# Patient Record
Sex: FEMALE | Race: WHITE | NOT HISPANIC OR LATINO | Employment: FULL TIME | URBAN - METROPOLITAN AREA
[De-identification: names, ages, dates, MRNs, and addresses within clinical notes are randomized per-mention and may not be internally consistent; named-entity substitution may affect disease eponyms.]

---

## 2017-06-13 ENCOUNTER — OFFICE VISIT - HEALTHEAST (OUTPATIENT)
Dept: FAMILY MEDICINE | Facility: CLINIC | Age: 32
End: 2017-06-13

## 2017-06-13 DIAGNOSIS — F41.9 ANXIETY: ICD-10-CM

## 2017-06-13 DIAGNOSIS — N91.2 AMENORRHEA: ICD-10-CM

## 2017-06-13 ASSESSMENT — MIFFLIN-ST. JEOR: SCORE: 1279.68

## 2017-06-14 ENCOUNTER — AMBULATORY - HEALTHEAST (OUTPATIENT)
Dept: FAMILY MEDICINE | Facility: CLINIC | Age: 32
End: 2017-06-14

## 2017-06-14 ENCOUNTER — COMMUNICATION - HEALTHEAST (OUTPATIENT)
Dept: FAMILY MEDICINE | Facility: CLINIC | Age: 32
End: 2017-06-14

## 2017-06-14 DIAGNOSIS — O20.0 THREATENED ABORTION: ICD-10-CM

## 2017-06-15 ENCOUNTER — AMBULATORY - HEALTHEAST (OUTPATIENT)
Dept: LAB | Facility: CLINIC | Age: 32
End: 2017-06-15

## 2017-06-15 DIAGNOSIS — O20.0 THREATENED ABORTION: ICD-10-CM

## 2017-06-19 ENCOUNTER — AMBULATORY - HEALTHEAST (OUTPATIENT)
Dept: LAB | Facility: CLINIC | Age: 32
End: 2017-06-19

## 2017-06-19 DIAGNOSIS — O20.0 THREATENED ABORTION: ICD-10-CM

## 2018-06-01 ENCOUNTER — TRANSFERRED RECORDS (OUTPATIENT)
Dept: HEALTH INFORMATION MANAGEMENT | Facility: CLINIC | Age: 33
End: 2018-06-01

## 2018-06-01 LAB — PAP SMEAR - HIM PATIENT REPORTED: NEGATIVE

## 2019-12-05 ENCOUNTER — PRENATAL OFFICE VISIT (OUTPATIENT)
Dept: NURSING | Facility: CLINIC | Age: 34
End: 2019-12-05
Payer: COMMERCIAL

## 2019-12-05 VITALS
HEIGHT: 64 IN | RESPIRATION RATE: 20 BRPM | WEIGHT: 148.8 LBS | OXYGEN SATURATION: 100 % | HEART RATE: 95 BPM | SYSTOLIC BLOOD PRESSURE: 129 MMHG | BODY MASS INDEX: 25.4 KG/M2 | DIASTOLIC BLOOD PRESSURE: 74 MMHG | TEMPERATURE: 97.1 F

## 2019-12-05 DIAGNOSIS — Z34.90 SUPERVISION OF NORMAL PREGNANCY: ICD-10-CM

## 2019-12-05 DIAGNOSIS — N91.2 AMENORRHEA: Primary | ICD-10-CM

## 2019-12-05 LAB
ALBUMIN UR-MCNC: NEGATIVE MG/DL
APPEARANCE UR: CLEAR
BILIRUB UR QL STRIP: NEGATIVE
COLOR UR AUTO: YELLOW
ERYTHROCYTE [DISTWIDTH] IN BLOOD BY AUTOMATED COUNT: 11.9 % (ref 10–15)
GLUCOSE UR STRIP-MCNC: NEGATIVE MG/DL
HCG UR QL: POSITIVE
HCT VFR BLD AUTO: 31.9 % (ref 35–47)
HGB BLD-MCNC: 10.7 G/DL (ref 11.7–15.7)
HGB UR QL STRIP: NEGATIVE
KETONES UR STRIP-MCNC: NEGATIVE MG/DL
LEUKOCYTE ESTERASE UR QL STRIP: NEGATIVE
MCH RBC QN AUTO: 29.5 PG (ref 26.5–33)
MCHC RBC AUTO-ENTMCNC: 33.5 G/DL (ref 31.5–36.5)
MCV RBC AUTO: 88 FL (ref 78–100)
NITRATE UR QL: NEGATIVE
PH UR STRIP: 6 PH (ref 5–7)
PLATELET # BLD AUTO: 272 10E9/L (ref 150–450)
RBC # BLD AUTO: 3.63 10E12/L (ref 3.8–5.2)
SOURCE: NORMAL
SP GR UR STRIP: <=1.005 (ref 1–1.03)
UROBILINOGEN UR STRIP-ACNC: 0.2 EU/DL (ref 0.2–1)
WBC # BLD AUTO: 9.8 10E9/L (ref 4–11)

## 2019-12-05 PROCEDURE — 86762 RUBELLA ANTIBODY: CPT | Performed by: OBSTETRICS & GYNECOLOGY

## 2019-12-05 PROCEDURE — 87086 URINE CULTURE/COLONY COUNT: CPT | Performed by: OBSTETRICS & GYNECOLOGY

## 2019-12-05 PROCEDURE — 99207 ZZC NO CHARGE NURSE ONLY: CPT

## 2019-12-05 PROCEDURE — 87340 HEPATITIS B SURFACE AG IA: CPT | Performed by: OBSTETRICS & GYNECOLOGY

## 2019-12-05 PROCEDURE — 86850 RBC ANTIBODY SCREEN: CPT | Performed by: OBSTETRICS & GYNECOLOGY

## 2019-12-05 PROCEDURE — 86780 TREPONEMA PALLIDUM: CPT | Performed by: OBSTETRICS & GYNECOLOGY

## 2019-12-05 PROCEDURE — 86900 BLOOD TYPING SEROLOGIC ABO: CPT | Performed by: OBSTETRICS & GYNECOLOGY

## 2019-12-05 PROCEDURE — 36415 COLL VENOUS BLD VENIPUNCTURE: CPT | Performed by: OBSTETRICS & GYNECOLOGY

## 2019-12-05 PROCEDURE — 85027 COMPLETE CBC AUTOMATED: CPT | Performed by: OBSTETRICS & GYNECOLOGY

## 2019-12-05 PROCEDURE — 87389 HIV-1 AG W/HIV-1&-2 AB AG IA: CPT | Performed by: OBSTETRICS & GYNECOLOGY

## 2019-12-05 PROCEDURE — 81003 URINALYSIS AUTO W/O SCOPE: CPT | Performed by: OBSTETRICS & GYNECOLOGY

## 2019-12-05 PROCEDURE — 86901 BLOOD TYPING SEROLOGIC RH(D): CPT | Performed by: OBSTETRICS & GYNECOLOGY

## 2019-12-05 PROCEDURE — 81025 URINE PREGNANCY TEST: CPT | Performed by: FAMILY MEDICINE

## 2019-12-05 RX ORDER — PYRIDOXINE HCL (VITAMIN B6) 25 MG
25 TABLET ORAL DAILY
COMMUNITY
End: 2020-07-23

## 2019-12-05 RX ORDER — FERROUS SULFATE 325(65) MG
325 TABLET ORAL
COMMUNITY
End: 2023-05-10

## 2019-12-05 ASSESSMENT — MIFFLIN-ST. JEOR: SCORE: 1369.95

## 2019-12-05 NOTE — LETTER
December 10, 2019      Rebecca Fischer  [address] [facility] [address]        Dear ,    We are writing to inform you of your test results.    Your lab results were normal except for your hemoglobin which is low. You should try to take your prenatal vitamins and eat an iron rich diet.     Resulted Orders   ABO/Rh type and screen   Result Value Ref Range    ABO A     RH(D) Pos     Antibody Screen Neg     Test Valid Only At [facility]        Specimen Expires 12/08/2019    Hepatitis B surface antigen   Result Value Ref Range    Hep B Surface Agn Nonreactive NR^Nonreactive   CBC with platelets   Result Value Ref Range    WBC 9.8 4.0 - 11.0 10e9/L    RBC Count 3.63 (L) 3.8 - 5.2 10e12/L    Hemoglobin 10.7 (L) 11.7 - 15.7 g/dL    Hematocrit 31.9 (L) 35.0 - 47.0 %    MCV 88 78 - 100 fl    MCH 29.5 26.5 - 33.0 pg    MCHC 33.5 31.5 - 36.5 g/dL    RDW 11.9 10.0 - 15.0 %    Platelet Count 272 150 - 450 10e9/L   HIV Antigen Antibody Combo   Result Value Ref Range    HIV Antigen Antibody Combo Nonreactive NR^Nonreactive          Comment:      HIV-1 p24 Ag & HIV-1/HIV-2 Ab Not Detected   Rubella Antibody IgG Quantitative   Result Value Ref Range    Rubella Antibody IgG Quantitative 25 IU/mL      Comment:      Positive.  Suggests previous exposure or immunization and probable immunity  Reference Range:    Unvaccinated Negative 0-7 IU/mL  Vaccinated or previous exposure Positive 10 IU/ml or greater     Treponema Abs w Reflex to RPR and Titer   Result Value Ref Range    Treponema Antibodies Nonreactive NR^Nonreactive   Urine Culture Aerobic Bacterial   Result Value Ref Range    Specimen Description Midstream Urine     Culture Micro 50,000 to 100,000 colonies/mL  urogenital prateek      *UA reflex to Microscopic   Result Value Ref Range    Color Urine Yellow     Appearance Urine Clear     Glucose Urine Negative NEG^Negative mg/dL    Bilirubin Urine Negative NEG^Negative    Ketones Urine Negative NEG^Negative  mg/dL    Specific Gravity Urine <=1.005 1.003 - 1.035    Blood Urine Negative NEG^Negative    pH Urine 6.0 5.0 - 7.0 pH    Protein Albumin Urine Negative NEG^Negative mg/dL    Urobilinogen Urine 0.2 0.2 - 1.0 EU/dL    Nitrite Urine Negative NEG^Negative    Leukocyte Esterase Urine Negative NEG^Negative    Source Midstream Urine        If you have any questions or concerns, please call the clinic at the number listed above.       Sincerely,        Bruna Ozuna MD

## 2019-12-05 NOTE — NURSING NOTE
Patient presents to clinic today prenatal education, she is accompanied by her  and toddler. This is the patient's 6th pregnancy. She has had 1 miscarriage(s), 0 (s), 4 term birth(s) and 0  birth(s). She has 4 living child(lorin).   This was not a planned pregnancy.  Reviewed answers to patient's Prenatal OB Questionnaire. Screening is positive for history of group B strep.   Nausea for the past 3 weeks, no vomiting, has NOT lost weight. History of anemia, is on iron.   Recently switched birth control and conceived while on oral contraceptive.  Is unsure of LMP, thinks was a light period around 10/3/19.       Medical, surgical, family and ObGyn history updated as appropriate. Reviewed current medications and allergies. Patient is taking prenatal vitamins.     Discussed all of the options within [place] for her prenatal care including FP/OB providers at [facility], midwives and OB/GYN at [facility] and OB/GYN at [place]. Next visit scheduled with Dr. Ozuna at [place].     Reviewed and discussed OB 1st Trimester Plans/Education  and also summarized in detail handouts and brochures included in OB Prenatal Folder that patient is able to keep and bring home with her.    Discussed all of the blood tests with pt who agrees to have all including HIV. Pt aware that results will be discussed at next office visit with MD unless abnl results are indicated and phone call will be made.    Will forward chart on to Prenatal Care Provider to review.    Eden Schmitz RN  [facility]

## 2019-12-05 NOTE — Clinical Note
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   Unplanned pregnancy, 4 previous term deliveries and one miscarriage.   Is on iron for history of anemia.   Conceived while taking oral contraceptive, unsure of LMP so early dating US ordered.   Schedule to see you in a few weeks.   Labs pending.Thanks!Emmie Schmitz, [facility]

## 2019-12-05 NOTE — PROGRESS NOTES
Prenatal OB Questionnaire  Past Medical History  Diabetes   No  Hypertension   No  Heart Disease, mitral valve prolapse, or rheumatic fever?   No  An autoimmune disorder such as Lupus or Rheumatoid Arthritis?   No  Kidney Disease or Urinary Tract Infection?   No  Epilepsy, seizures or spells?   No  Migraine headaches?   No  A stroke or loss of function or sensation?   No  Any other neurological problems?   No  Have you ever been treated for depression?  No  Are you having problems with crying spells or loss of self-esteem?   No  Have you ever required psychiatric care?   No  Have you ever hepatitis, liver disease or jaundice?   No  Have you ever been treated for blood clots in your veins, deep venous thrombosis, inflammation in the veins, thrombosis, phlebitis, pulmonary embolism or varicosities?   No  Have you had excessive bleeding after surgery or dental work?   No  Do you bleed more than other women after a cut or scratch?   No  Do you have a history of anemia?   Yes, currently on iron  Have you ever been treated for thyroid problems or taken thyroid medication?  No  Do you have any other endocrine problems?  No  Have you ever been in a major accident or suffered serious trauma?   No  Within the last year, has anyone hit slapped, kicked or otherwise hurt you?  No  In the last year, has anyone forced you to have sex when you didn't want to?  No  Have you ever had a blood transfusion?   No  Would you refuse a blood transfusion if a doctor judged it to be medically necessary?   No  If you answered yes, would you rather die than have a blood transfusion?   N/A  If you answered yes, is this for Synagogue reasons?   N/A  Does anyone in your home smoke?   Yes,  smokes outdoors  Do you use tobacco products?  No  Do you drink beer, wine, hard liquor?  Yes, occasional, none since pregnancy known  Do you use any of the following: marijuana, speed, cocaine, heroine, hallucinogens, or other drugs?  No  Is your blood  type Rh negative?   No  Have you ever had abnormal antibodies in your blood?   No  Have you ever had asthma?   No  Have you ever had tuberculosis?   No  Do you have any allergies to drugs or over-the-counter medications?   No    Allergies as of 12/5/2019:    Allergies as of 12/05/2019     (No Known Allergies)       Do you have any breast problems?   No  Have you ever ?   Yes, 4-12 mos with previous deliveries  Have you had any gynecological surgical procedures such as cervical conization, a LEEP procedure, laser treatment, cryosurgery of the cervix, or a dilation and curettage, etc?  No  Have you had any other surgical procedures?  Yes, bilateral breast augmentation July 2016  Have you been hospitalized for a nonsurgical reason excluding normal delivery?   No  Have you ever had any anesthetic complications?   Yes, heart rate dropped during epidural infusion with 4/22/18 delivery, had to stop infusion and give meds to increase heart rate  Have you ever had an abnormal pap smear?   No  Do you have a history of abnormalities of the uterus?   No  Did it take you more than one year to become pregnant?   No  Have you ever been evaluated or treated for infertility?   No  Is there a history of medical problems in your family, which you feel might adversely affect your health or pregnancy?   No  Do you have any other problems we have not asked you about which you feel may be important to this pregnancy?  No    Symptoms since Last Menstrual Period  Do you have any of the following:    *abdominal pain  No  *blood in stool or urine  No  *chest pain  No  *shortness of breath  No  *coughing or vomiting up blood No  *heart racing or skipping beats  No  *nausea and vomiting  Yes, no vomiting, has had nausea much of the day for the past 3 weeks, is taking B6, has not lost weight  *pain with urination  No  *vaginal discharge or bleeding  Yes, pink/brown spotting 3 times 2 days ago, no cramping, none since  Current  medications are:  Current Outpatient Medications   Medication Sig Dispense Refill     ferrous sulfate (FEROSUL) 325 (65 Fe) MG tablet Take 325 mg by mouth daily (with breakfast)       Prenatal Vit-Fe Fumarate-FA (PRENATAL VITAMIN AND MINERAL PO)        vitamin B6 (PYRIDOXINE) 25 MG tablet Take 25 mg by mouth daily         Genetic Screening  At the time of birth, will you be 35 years old or older?  No  Has the patient, baby s father, or anyone in either family had:  Thalassemia (Italian, Greek, Mediterranean, or  background only) and an MCV result less than 80?  No  Neural tube defect such as meningomyelocele, spina bifida or anencephaly?  No  Congenital heart defect?  No  Down s syndrome?  No  Jerry-Sach s disease (Religion, Cajun, Estonian-McKenzie)?  No  Sickle cell disease or trait (Twyla)?  No  Hemophilia or other inherited problems of blood coagulation? No  Muscular dystrophy?  No  Cystic Fibrosis?  No  Melony s chorea?  No  Mental retardation/autism? No   If yes, was the person tested for fragile X?  N/A  Any other inherited genetic or chromosomal disorder?  No  Maternal metabolic disorder (e.g. insulin-dependent diabetes, PKU)? No  A child with birth defects not listed above?  No  Recurrent pregnancy loss or a stillbirth?  No  Has the patient had any medications/street drugs/alcohol since her last menstrual period? No  Does the patient or baby s father have any other genetic risks?  No  Infection History  Do you object to being tested for Hepatitis B? No  Do you object to being tested for HIV? No  Do you feel that you are at high risk for coming in contact with the AIDS virus?  No  Have you ever been treated for tuberculosis?  No  Have you ever received the BCG vaccine for tuberculosis?  No  Have you ever had a positive skin test for tuberculosis? No  Do you live with someone who has tuberculosis?  No  Have you ever been exposed to tuberculosis?  No  Do you have genital herpes?  No  Does your partner have  genital herpes?  No  Have you had a rash or viral illness since your last period?  No  Have you ever had Gonorrhea, Chlamydia, Syphilis, venereal warts, trichomoniasis, pelvic inflammatory disease or any other sexually transmitted disease?  No  Do you know if you are a genital group B streptococcus carrier? Yes  You have not had chicken pox/varicella  No  Have you been vaccinated against chicken pox?  No  Have you had any other infectious disease? No        Early ultrasound screening tool:    Does patient have irregular periods?  Yes, unsure of LMP  Did patient use hormonal birth control in the three months prior to positive urine pregnancy test? Yes, conceived while on oral contraceptive  Is the patient breastfeeding?  No  Is the patient 10 weeks or greater at time of education visit?  No    Early dating US ordered, patient provided phone number to call to schedule; advised to have this done soon, before her first OB appointment.      Chart routed to OB provider to review labs and chart.    Eden Schmitz RN  [facility]

## 2019-12-06 LAB
ABO + RH BLD: NORMAL
ABO + RH BLD: NORMAL
BACTERIA SPEC CULT: NORMAL
BLD GP AB SCN SERPL QL: NORMAL
BLOOD BANK CMNT PATIENT-IMP: NORMAL
HBV SURFACE AG SERPL QL IA: NONREACTIVE
HIV 1+2 AB+HIV1 P24 AG SERPL QL IA: NONREACTIVE
RUBV IGG SERPL IA-ACNC: 25 IU/ML
SPECIMEN EXP DATE BLD: NORMAL
SPECIMEN SOURCE: NORMAL
T PALLIDUM AB SER QL: NONREACTIVE

## 2019-12-08 ENCOUNTER — TELEPHONE (OUTPATIENT)
Dept: OBGYN | Facility: CLINIC | Age: 34
End: 2019-12-08

## 2019-12-08 NOTE — TELEPHONE ENCOUNTER
Reason for call:  Other   Patient called regarding (reason for call): appointment  Additional comments: Please call pt to reschedule both appts on Wednesday 12/11/19    Phone number to reach patient:  Home number on file [phone] (home)    Best Time:  mornings    Can we leave a detailed message on this number?  YES

## 2019-12-10 ENCOUNTER — OFFICE VISIT (OUTPATIENT)
Dept: OBGYN | Facility: CLINIC | Age: 34
End: 2019-12-10
Attending: OBSTETRICS & GYNECOLOGY
Payer: COMMERCIAL

## 2019-12-10 ENCOUNTER — ANCILLARY PROCEDURE (OUTPATIENT)
Dept: ULTRASOUND IMAGING | Facility: CLINIC | Age: 34
End: 2019-12-10
Attending: OBSTETRICS & GYNECOLOGY
Payer: COMMERCIAL

## 2019-12-10 VITALS
DIASTOLIC BLOOD PRESSURE: 72 MMHG | BODY MASS INDEX: 25.75 KG/M2 | SYSTOLIC BLOOD PRESSURE: 111 MMHG | WEIGHT: 150 LBS | HEART RATE: 72 BPM | TEMPERATURE: 98.2 F

## 2019-12-10 DIAGNOSIS — Z34.90 EARLY STAGE OF PREGNANCY: Primary | ICD-10-CM

## 2019-12-10 DIAGNOSIS — Z34.90 SUPERVISION OF NORMAL PREGNANCY: ICD-10-CM

## 2019-12-10 PROCEDURE — 99202 OFFICE O/P NEW SF 15 MIN: CPT | Performed by: OBSTETRICS & GYNECOLOGY

## 2019-12-10 PROCEDURE — 76815 OB US LIMITED FETUS(S): CPT | Performed by: OBSTETRICS & GYNECOLOGY

## 2019-12-10 NOTE — PROGRESS NOTES
GYN clinic visit  12/10/2019    CC: ultrasound follow up    HPI:   Rebecca Fischer is a 34 year old  who presents to clinic today to discuss ultrasound results.     Has been feeling well other than fatigued.  No cramping or bleeding.  Some upset stomach, but denies true nausea and no vomiting.  Has noticed some skin sensitivity as well.  Is usually sensitive to metals, but typically wedding ring doesn't bother her.  She has noticed sensitivity to her wedding ring this pregnancy, so has stopped wearing for now.    Recent ultrasound was done today for dating/viability. It showed single live IUP measuring 9w1d, consistent with LMP dating.     Reviewed GYN hx, OB hx, past medical hx, surgical hx and family hx.   Reviewed medications and allergies. Changes made in EPIC.     OBJECTIVE:  Vitals:    12/10/19 0911   BP: 111/72   BP Location: Left arm   Patient Position: Sitting   Cuff Size: Adult Regular   Pulse: 72   Temp: 98.2  F (36.8  C)   TempSrc: Oral   Weight: 68 kg (150 lb)     Body mass index is 25.75 kg/m .    Gen: alert, oriented, no distress, cooperative and pleasant  Psych:  Appropriate mood and affect  Neuro:  Gait WNL.  Sensation and strength grossly intact    ASSESSMENT:   Rebecca Fischer is a 34 year old  here to discuss ultrasound results.       PLAN:  1. Early stage of pregnancy  Discussed skin sensitivity can be normal in pregnancy.  Cont to monitor.  Has NOB visit scheduled in [place].    Reviewed prenatal labs, all WNL.    RTC for NOB or sooner prn.    Radha Tobar MD

## 2019-12-10 NOTE — NURSING NOTE
"Chief Complaint   Patient presents with     Follow Up     Ultrasound       Initial /72 (BP Location: Left arm, Patient Position: Sitting, Cuff Size: Adult Regular)   Pulse 72   Temp 98.2  F (36.8  C) (Oral)   Wt 68 kg (150 lb)   LMP 10/03/2019 (Within Weeks)   BMI 25.75 kg/m   Estimated body mass index is 25.75 kg/m  as calculated from the following:    Height as of 19: 1.626 m (5' 4\").    Weight as of this encounter: 68 kg (150 lb).  BP completed using cuff size: regular    Questioned patient about current smoking habits.  Pt. quit smoking some time ago.          The following HM Due: pap smear      The following patient reported/Care Every where data was sent to:  P ABSTRACT QUALITY INITIATIVES [[id number]]  ca Quinones MA            "

## 2019-12-26 ENCOUNTER — PRENATAL OFFICE VISIT (OUTPATIENT)
Dept: OBGYN | Facility: CLINIC | Age: 34
End: 2019-12-26
Payer: COMMERCIAL

## 2019-12-26 VITALS
TEMPERATURE: 98.1 F | BODY MASS INDEX: 25.54 KG/M2 | OXYGEN SATURATION: 99 % | WEIGHT: 149.6 LBS | DIASTOLIC BLOOD PRESSURE: 77 MMHG | SYSTOLIC BLOOD PRESSURE: 134 MMHG | HEART RATE: 103 BPM | HEIGHT: 64 IN

## 2019-12-26 DIAGNOSIS — Z13.21 ENCOUNTER FOR VITAMIN DEFICIENCY SCREENING: ICD-10-CM

## 2019-12-26 DIAGNOSIS — D64.9 ANEMIA, UNSPECIFIED TYPE: ICD-10-CM

## 2019-12-26 DIAGNOSIS — B37.31 YEAST INFECTION OF THE VAGINA: ICD-10-CM

## 2019-12-26 DIAGNOSIS — Z34.81 ENCOUNTER FOR SUPERVISION OF OTHER NORMAL PREGNANCY IN FIRST TRIMESTER: Primary | ICD-10-CM

## 2019-12-26 LAB
HGB BLD-MCNC: 10.6 G/DL (ref 11.7–15.7)
SPECIMEN SOURCE: ABNORMAL
WET PREP SPEC: ABNORMAL

## 2019-12-26 PROCEDURE — 82728 ASSAY OF FERRITIN: CPT | Performed by: OBSTETRICS & GYNECOLOGY

## 2019-12-26 PROCEDURE — 00000218 ZZHCL STATISTIC OBHBG - HEMOGLOBIN: Performed by: OBSTETRICS & GYNECOLOGY

## 2019-12-26 PROCEDURE — 99207 ZZC FIRST OB VISIT: CPT | Performed by: OBSTETRICS & GYNECOLOGY

## 2019-12-26 PROCEDURE — 83550 IRON BINDING TEST: CPT | Performed by: OBSTETRICS & GYNECOLOGY

## 2019-12-26 PROCEDURE — 83540 ASSAY OF IRON: CPT | Performed by: OBSTETRICS & GYNECOLOGY

## 2019-12-26 PROCEDURE — 82306 VITAMIN D 25 HYDROXY: CPT | Performed by: OBSTETRICS & GYNECOLOGY

## 2019-12-26 PROCEDURE — 36415 COLL VENOUS BLD VENIPUNCTURE: CPT | Performed by: OBSTETRICS & GYNECOLOGY

## 2019-12-26 PROCEDURE — 87210 SMEAR WET MOUNT SALINE/INK: CPT | Performed by: OBSTETRICS & GYNECOLOGY

## 2019-12-26 ASSESSMENT — PATIENT HEALTH QUESTIONNAIRE - PHQ9
SUM OF ALL RESPONSES TO PHQ QUESTIONS 1-9: 6
5. POOR APPETITE OR OVEREATING: SEVERAL DAYS

## 2019-12-26 ASSESSMENT — ANXIETY QUESTIONNAIRES
IF YOU CHECKED OFF ANY PROBLEMS ON THIS QUESTIONNAIRE, HOW DIFFICULT HAVE THESE PROBLEMS MADE IT FOR YOU TO DO YOUR WORK, TAKE CARE OF THINGS AT HOME, OR GET ALONG WITH OTHER PEOPLE: SOMEWHAT DIFFICULT
3. WORRYING TOO MUCH ABOUT DIFFERENT THINGS: NOT AT ALL
5. BEING SO RESTLESS THAT IT IS HARD TO SIT STILL: NOT AT ALL
7. FEELING AFRAID AS IF SOMETHING AWFUL MIGHT HAPPEN: NOT AT ALL
1. FEELING NERVOUS, ANXIOUS, OR ON EDGE: SEVERAL DAYS
GAD7 TOTAL SCORE: 2
6. BECOMING EASILY ANNOYED OR IRRITABLE: NOT AT ALL
2. NOT BEING ABLE TO STOP OR CONTROL WORRYING: NOT AT ALL

## 2019-12-26 ASSESSMENT — MIFFLIN-ST. JEOR: SCORE: 1373.58

## 2019-12-26 NOTE — PROGRESS NOTES
"Chief Complaint   Patient presents with     Prenatal Care     12+0.       Initial /77 (BP Location: Left arm, Patient Position: Sitting, Cuff Size: Adult Regular)   Pulse 103   Temp 98.1  F (36.7  C) (Oral)   Ht 1.626 m (5' 4\")   Wt 67.9 kg (149 lb 9.6 oz)   LMP 10/03/2019 (Within Weeks)   SpO2 99%   BMI 25.68 kg/m   Estimated body mass index is 25.68 kg/m  as calculated from the following:    Height as of this encounter: 1.626 m (5' 4\").    Weight as of this encounter: 67.9 kg (149 lb 9.6 oz).  BP completed using cuff size: regular    Questioned patient about current smoking habits.  Pt. quit smoking some time ago.          The following HM Due: NONE      The following patient reported/Care Every where data was sent to:  P ABSTRACT QUALITY INITIATIVES [[id number]]  Pap smear done on this date: 2018 (approximately), by this group: [facility], results were NIL.       n/a and patient has appointment for today          SUBJECTIVE:   Rebecca Fischer is a  34 year old female   @ 12w0d  by LMP which is consistent with 9 wk US who presents for a new Ob visit.  She is here with her .  Pregnancy was conceived on oral contraceptive pills. Her Estimated Date of Delivery: 2020.    She has a h/o EDWARD and Patient was taking 2 iron pills a day prior to her nob labs.  They are both nurses.  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   She has 4 daughters, the first 3 are from a previous relationship.  He has a couple children as well from previous relationship. They have one child together and this will be the second.   She is accepting of the pregnancy but just very tired and nauseated.     POBHx:  OB History    Para Term  AB Living   6 4 4 0 1 4   SAB TAB Ectopic Multiple Live Births   1 0 0 0 4      # Outcome Date GA Lbr [place]/2nd Weight Sex Delivery Anes PTL Lv   6 Current            5 Term 18 39w0d  3.402 kg (7 lb 8 oz) F Induction EPI  DONAVAN      Birth Comments: heart " "rate dropped during epidural      Complications: Anesthetic Complications      Name: Clara   4 Term 17 40w0d  4.037 kg (8 lb 14.4 oz) F Induction   DONAVAN      Name: Louise   3 SAB 2017 7w0d          2 Term 10/21/11 40w0d  3.674 kg (8 lb 1.6 oz) F    DONAVAN      Name: Valarie   1 Term 09 40w0d  3.856 kg (8 lb 8 oz) F Induction   DONAVAN      Name: Dallas      Obstetric Comments   Oldest 2 were born in [place].    Then regions for the 3rd and last one [facility].    PPH with first baby.     Last baby induced for maternal request   Second delivery was fast.           Patient Active Problem List   Diagnosis     Supervision of normal pregnancy       Past Medical History:   Diagnosis Date     Anemia     on supplemental iron     NO ACTIVE PROBLEMS        Past Surgical History:   Procedure Laterality Date     C BREAST AUGMENTATION          Current Outpatient Medications   Medication     ferrous sulfate (FEROSUL) 325 (65 Fe) MG tablet     Prenatal Vit-Fe Fumarate-FA (PRENATAL VITAMIN AND MINERAL PO)     vitamin B6 (PYRIDOXINE) 25 MG tablet     No current facility-administered medications for this visit.        No Known Allergies      EXAM:  /77 (BP Location: Left arm, Patient Position: Sitting, Cuff Size: Adult Regular)   Pulse 103   Temp 98.1  F (36.7  C) (Oral)   Ht 1.626 m (5' 4\")   Wt 67.9 kg (149 lb 9.6 oz)   LMP 10/03/2019 (Within Weeks)   SpO2 99%   BMI 25.68 kg/m    EXAM  GENERAL: WDWN WF in NAD  HEENT: no abnormalities  NECK: without thyromegaly or adenopathy  BACK: without CVAT or paraspinal tenderness  CHEST: clear to auscultation  CV: RRR without murmur  BREASTS: no palpable masses or adenopathy, no asymmetry or skin dimpling  ABDOMEN: S, NT, no palpable masses or hepatosplenomegaly  MUSCULOSKELETAL: no obvious abnormalities.  NEUROLOGICAL: normal strength, sensation, mental status  PSYCH: normal affect, appropriate  PELVIC: EG - normal adult female.  BUS - within normal limits.  " Vagina - well rugated, + discharge.  Cervix - no lesions, no CMT.  Uterus - 12 wk  size and nontender.  Adnexae - no masses or tenderness.  RV - deferred.    FHT's present with doptone      ASSESSMENT/ PLAN:  IUP @ 12w0d by LMP and early US  Conceived on oral contraceptive pills     Encounter Diagnoses   Name Primary?     Encounter for supervision of other normal pregnancy in first trimester Yes     Encounter for vitamin deficiency screening Vitamin D     Yeast infection of the vagina monistat     Anemia, unspecified type Will check iron studies today        Discussed routine prenatal care and first trimester screen testing.    She declined the first trimester screen.      Patient was counselled on healthy lifestyle habits and all questions answered.    New Ob labs reviewed today.    Return to Clinic in 4 weeks or sooner if problems arise.    Bruna Ozuna MD

## 2019-12-27 LAB
DEPRECATED CALCIDIOL+CALCIFEROL SERPL-MC: 41 UG/L (ref 20–75)
FERRITIN SERPL-MCNC: 117 NG/ML (ref 12–150)
IRON SATN MFR SERPL: 40 % (ref 15–46)
IRON SERPL-MCNC: 149 UG/DL (ref 35–180)
TIBC SERPL-MCNC: 376 UG/DL (ref 240–430)

## 2019-12-27 ASSESSMENT — ANXIETY QUESTIONNAIRES: GAD7 TOTAL SCORE: 2

## 2020-02-04 ENCOUNTER — PRENATAL OFFICE VISIT (OUTPATIENT)
Dept: OBGYN | Facility: CLINIC | Age: 35
End: 2020-02-04
Payer: COMMERCIAL

## 2020-02-04 VITALS
TEMPERATURE: 98.5 F | BODY MASS INDEX: 26.73 KG/M2 | HEART RATE: 86 BPM | HEIGHT: 64 IN | OXYGEN SATURATION: 100 % | DIASTOLIC BLOOD PRESSURE: 74 MMHG | WEIGHT: 156.6 LBS | SYSTOLIC BLOOD PRESSURE: 117 MMHG

## 2020-02-04 DIAGNOSIS — Z34.82 ENCOUNTER FOR SUPERVISION OF OTHER NORMAL PREGNANCY IN SECOND TRIMESTER: ICD-10-CM

## 2020-02-04 PROCEDURE — 99207 ZZC PRENATAL VISIT: CPT | Performed by: OBSTETRICS & GYNECOLOGY

## 2020-02-04 ASSESSMENT — MIFFLIN-ST. JEOR: SCORE: 1405.33

## 2020-02-04 NOTE — PROGRESS NOTES
17w5d  Feeling much better.  Eating better.  Can feel some FM.    Declined quad screen.  Will get survey set up.  discussed prenatal massage.   Taking PNV, vit D and magnesium for muscle cramps. RR

## 2020-02-26 ENCOUNTER — ANCILLARY PROCEDURE (OUTPATIENT)
Dept: ULTRASOUND IMAGING | Facility: CLINIC | Age: 35
End: 2020-02-26
Attending: OBSTETRICS & GYNECOLOGY
Payer: COMMERCIAL

## 2020-02-26 ENCOUNTER — TRANSCRIBE ORDERS (OUTPATIENT)
Dept: MATERNAL FETAL MEDICINE | Facility: CLINIC | Age: 35
End: 2020-02-26

## 2020-02-26 ENCOUNTER — PRENATAL OFFICE VISIT (OUTPATIENT)
Dept: OBGYN | Facility: CLINIC | Age: 35
End: 2020-02-26
Attending: OBSTETRICS & GYNECOLOGY
Payer: COMMERCIAL

## 2020-02-26 VITALS
TEMPERATURE: 97.7 F | HEIGHT: 64 IN | HEART RATE: 83 BPM | OXYGEN SATURATION: 99 % | DIASTOLIC BLOOD PRESSURE: 69 MMHG | SYSTOLIC BLOOD PRESSURE: 116 MMHG | WEIGHT: 162.5 LBS | BODY MASS INDEX: 27.74 KG/M2

## 2020-02-26 DIAGNOSIS — Z34.82 ENCOUNTER FOR SUPERVISION OF OTHER NORMAL PREGNANCY IN SECOND TRIMESTER: ICD-10-CM

## 2020-02-26 DIAGNOSIS — O26.90 PREGNANCY RELATED CONDITION, ANTEPARTUM: Primary | ICD-10-CM

## 2020-02-26 DIAGNOSIS — Z34.82 ENCOUNTER FOR SUPERVISION OF OTHER NORMAL PREGNANCY IN SECOND TRIMESTER: Primary | ICD-10-CM

## 2020-02-26 PROCEDURE — 99207 ZZC PRENATAL VISIT: CPT | Performed by: OBSTETRICS & GYNECOLOGY

## 2020-02-26 PROCEDURE — 76805 OB US >/= 14 WKS SNGL FETUS: CPT | Performed by: OBSTETRICS & GYNECOLOGY

## 2020-02-26 ASSESSMENT — MIFFLIN-ST. JEOR: SCORE: 1432.1

## 2020-02-26 NOTE — PROGRESS NOTES
20w6d  No complaints.  Baby is moving well.  Had US survey today and did not find out gender.   Left EIF noted, otherwise normal.  Will send to [facility] for level II. discussed findings can be seen in normal babies.  RR

## 2020-02-28 ENCOUNTER — PRE VISIT (OUTPATIENT)
Dept: MATERNAL FETAL MEDICINE | Facility: CLINIC | Age: 35
End: 2020-02-28

## 2020-03-02 ENCOUNTER — HOSPITAL ENCOUNTER (OUTPATIENT)
Dept: ULTRASOUND IMAGING | Facility: CLINIC | Age: 35
Discharge: HOME | End: 2020-03-02
Attending: OBSTETRICS & GYNECOLOGY | Admitting: OBSTETRICS & GYNECOLOGY
Payer: COMMERCIAL

## 2020-03-02 ENCOUNTER — OFFICE VISIT (OUTPATIENT)
Dept: MATERNAL FETAL MEDICINE | Facility: CLINIC | Age: 35
End: 2020-03-02
Attending: OBSTETRICS & GYNECOLOGY
Payer: COMMERCIAL

## 2020-03-02 DIAGNOSIS — O35.BXX0 FETAL CARDIAC ECHOGENIC FOCUS, ANTEPARTUM, SINGLE OR UNSPECIFIED FETUS: Primary | ICD-10-CM

## 2020-03-02 DIAGNOSIS — O26.90 PREGNANCY RELATED CONDITION, ANTEPARTUM: ICD-10-CM

## 2020-03-02 PROCEDURE — 76811 OB US DETAILED SNGL FETUS: CPT

## 2020-03-02 NOTE — PROGRESS NOTES
"Please see \"Imaging\" tab under \"Chart Review\" for details of today's US at the [facility].    Zachary Capps MD  Maternal-Fetal Medicine      "

## 2020-03-11 ENCOUNTER — HEALTH MAINTENANCE LETTER (OUTPATIENT)
Age: 35
End: 2020-03-11

## 2020-03-24 ENCOUNTER — PRENATAL OFFICE VISIT (OUTPATIENT)
Dept: OBGYN | Facility: CLINIC | Age: 35
End: 2020-03-24
Payer: COMMERCIAL

## 2020-03-24 DIAGNOSIS — D64.9 ANEMIA, UNSPECIFIED TYPE: ICD-10-CM

## 2020-03-24 DIAGNOSIS — Z34.82 ENCOUNTER FOR SUPERVISION OF OTHER NORMAL PREGNANCY IN SECOND TRIMESTER: Primary | ICD-10-CM

## 2020-03-24 PROCEDURE — 99207 ZZC PRENATAL VISIT: CPT | Performed by: OBSTETRICS & GYNECOLOGY

## 2020-03-24 NOTE — PROGRESS NOTES
"Rebecca Fischer is a 34 year old female who is being evaluated via a billable telephone visit.      The patient has been notified of following:     \"This telephone visit will be conducted via a call between you and your physician/provider. We have found that certain health care needs can be provided without the need for a physical exam.  This service lets us provide the care you need with a short phone conversation.  If a prescription is necessary we can send it directly to your pharmacy.  If lab work is needed we can place an order for that and you can then stop by our lab to have the test done at a later time.    If during the course of the call the physician/provider feels a telephone visit is not appropriate, you will not be charged for this service.\"     Rebecca Fischer complains of  No chief complaint on file.      I have reviewed and updated the patient's Past Medical History, Social History, Family History and Medication List.    ALLERGIES  Patient has no known allergies.    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  There are COVID pts on the unit but she Has not been assigned to any COVID or PUI patients. Emotionally doing ok.  Has been observing all COVID recommendations in regards to precautions at home and work.   Reviewed need to take iron.  Chart reviewed and despite low hgb, her iron studies have been normal. discussed might have thalassemia.  Can do a hgb Elp at next visit when we do her GCT. Nonetheless, she will start taking iron at this time.   Baby is moving well.  No ob concerns.   Had a isolated finding of EIF on MFM US. No further testing was done.     Assessment/Plan:  1. Encounter for supervision of other normal pregnancy in second trimester  2. Anemia, unspecified type    See info no AVS.   Phone call duration: 10 minutes    Bruna Ozuna MD       "

## 2020-03-24 NOTE — PATIENT INSTRUCTIONS
The best way to prevent an infection is to avoid being exposed to the virus. We recommend that you wash your hands frequently and avoid touching your eyes, nose or mouth.  Practice social distancing by staying home as much as possible, avoiding gatherings and minimize trips for essentials. You should especially avoid any contact with people who are sick. It is possible that people who have the virus have little or no symptoms which is why social distancing is so important to avoid spreading the virus. Clean frequently touched surfaces daily. If you do start to have symptoms such as cough, fever or mild shortness of breath, you should stay home for at least 14 days.  If your symptoms are worrisome or severe or you are scheduled during this time to have a clinic visit you should call us at [phone].    Due to anticipated shortage of blood products we recommend all pregnant women take an iron supplement (ferrous gluconate or ferrous sulfate) in addition to a prenatal vitamin.     The hospital has strict visitor restrictions at this time for your safety. You are allowed to have one healthy adult visitor during your hospital stay and it must be the same person the entire time.     For information about Covid-19 and pregnancy we look to the center for disease control, the CDC. You can look at this information online at:   https://www.cdc.gov/coronavirus/2019-ncov/hcp/pregnant-women-faq.html

## 2020-03-24 NOTE — Clinical Note
Please call this patient and set her up for a clinic visit and doing GCT on 4/21 with me at RD.  Thanks.

## 2020-04-24 ENCOUNTER — PRENATAL OFFICE VISIT (OUTPATIENT)
Dept: OBGYN | Facility: CLINIC | Age: 35
End: 2020-04-24
Payer: COMMERCIAL

## 2020-04-24 DIAGNOSIS — Z34.83 ENCOUNTER FOR SUPERVISION OF OTHER NORMAL PREGNANCY IN THIRD TRIMESTER: Primary | ICD-10-CM

## 2020-04-24 DIAGNOSIS — Z23 NEED FOR TDAP VACCINATION: ICD-10-CM

## 2020-04-24 LAB
GLUCOSE 1H P 50 G GLC PO SERPL-MCNC: 86 MG/DL (ref 60–129)
HGB BLD-MCNC: 10.4 G/DL (ref 11.7–15.7)

## 2020-04-24 PROCEDURE — 90715 TDAP VACCINE 7 YRS/> IM: CPT | Performed by: OBSTETRICS & GYNECOLOGY

## 2020-04-24 PROCEDURE — 83021 HEMOGLOBIN CHROMOTOGRAPHY: CPT | Mod: 90 | Performed by: OBSTETRICS & GYNECOLOGY

## 2020-04-24 PROCEDURE — 82950 GLUCOSE TEST: CPT | Performed by: OBSTETRICS & GYNECOLOGY

## 2020-04-24 PROCEDURE — 99000 SPECIMEN HANDLING OFFICE-LAB: CPT | Performed by: OBSTETRICS & GYNECOLOGY

## 2020-04-24 PROCEDURE — 00000218 ZZHCL STATISTIC OBHBG - HEMOGLOBIN: Performed by: OBSTETRICS & GYNECOLOGY

## 2020-04-24 PROCEDURE — 36415 COLL VENOUS BLD VENIPUNCTURE: CPT | Performed by: OBSTETRICS & GYNECOLOGY

## 2020-04-24 PROCEDURE — 99207 ZZC PRENATAL VISIT: CPT | Performed by: OBSTETRICS & GYNECOLOGY

## 2020-04-24 PROCEDURE — 90471 IMMUNIZATION ADMIN: CPT | Performed by: OBSTETRICS & GYNECOLOGY

## 2020-04-24 ASSESSMENT — PATIENT HEALTH QUESTIONNAIRE - PHQ9: SUM OF ALL RESPONSES TO PHQ QUESTIONS 1-9: 3

## 2020-04-24 NOTE — NURSING NOTE
Clinic Administered Medication Documentation      Injectable Medication Documentation    Patient was given tdap. Prior to medication administration, verified patients identity using patient s name and date of birth. Please see MAR and medication order for additional information. Patient instructed to remain in clinic for 15 minutes.      Was entire vial of medication used? Yes  Vial/Syringe: Single dose vial  Expiration Date:  3/21/22  Was this medication supplied by the patient? No

## 2020-04-24 NOTE — PATIENT INSTRUCTIONS
Patient Education     Adapting to Pregnancy: Third Trimester    Although common during pregnancy, some discomforts may seem worse in the final weeks. Simple lifestyle changes can help. Take care of yourself. And ask your partner to help out with small tasks.  Limiting leg problems  Ways to combat leg issues:    Wear support hose all day.    Avoid snug shoes and clothes that bind, like tight pants and socks with elastic tops.    Sit with your feet and legs raised often.  Caring for your breasts  Tips to follow include:    Wash with plain water. Avoid using harsh soaps or rubbing alcohol. They may cause dryness.    Wear a nursing bra for extra support. It can also hide any leaks from your nipples.  Controlling hemorrhoids  Ways to avoid hemorrhoids include:    Eat foods that are high in fiber. Also, exercise and drink enough fluids. This will reduce constipation and hemorrhoids.    Sleep and nap on your side. This limits pressure on the veins of your rectum.    Try not to stand or sit for long periods.  Controlling back pain  As your body changes during pregnancy, your back must work in new ways. Back pain is due to many causes. Physical changes in your body can strain your back and its supporting muscles. Also, hormones (chemicals that carry messages throughout the body) increase during pregnancy. This can affect how your muscles and joints work together. All of these changes can lead to pain. Pain may be felt in the upper or lower back. Pain is also common in the pelvis. Some pregnant women have sciatica. This is pain caused by pressure on the sciatic nerve running down the back of the leg. Ask your healthcare provider for specific tips and exercises to help control your back pain.  Tips to help you rest  Good rest and sleep will help you feel better. Here are some ideas:    Ask your partner to massage your shoulders, neck, or back.    Limit the errands you do each day.    Lie down in the afternoon or after work  for a few minutes.    Take a warm bath before you go to sleep.    Drink warm milk or teas without caffeine.    Avoid coffee, black tea, and cola.  Stopping heartburn    Avoid spicy, greasy, fried, or acidic foods.    Eat small amounts more often. Eat slowly.   Wait 2 hours after eating before lying down.    Sleep with your upper body raised 6 inches.   Managing mood swings  Ways to manage mood swings include:    Know that mood changes are normal.    Exercise often, but get plenty of rest.    Address any concerns and limit stress. Talking to your partner, other women, or your healthcare provider may help.  Dealing with urinary frequency  Tips to deal with having to urinate often include:    Drink plenty of water all day. If you drink a lot in the evening, though, you may have to get up more in the night.    Limit coffee, black tea, and cola.  Date Last Reviewed: 2/1/2018 2000-2019 [employer]. [address] [place] [address]. All rights reserved. This information is not intended as a substitute for professional medical care. Always follow your healthcare professional's instructions.           Patient Education     Comfort Tips During Pregnancy    Talk with your healthcare provider before using pain-relieving medicine at any time during your pregnancy.  First trimester tips  Nausea    Get up slowly. Eat a few unsalted crackers before you get out of bed.    Avoid smells that bother you.    Eat small bland low fat, light high-protein meals at frequent intervals.    Sip on water, weak tea, or clear soft drinks, like ginger ale. Eat ice chips.  Fatigue    Take catnaps when you can.    Get regular exercise.    Accept help from others.    Practice good sleep habits, like going to bed and getting up at the same time each day. Use your bed only for sleep and sex.  Mood swings    Talk about your feelings with others, including other mothers.    Limit sugar, chocolate, and caffeine.    Eat a healthy  diet. Don t skip meals.    Get regular exercise.  Headaches    Get fresh air and exercise.    Relax and get enough rest.    Check with your healthcare provider before taking any pain medicines.  Second trimester tips  Here are some suggestions to help you cope:    To limit ankle swelling, sit with your feet raised or wear support hose.    If you have pain in your groin and stomach (round ligament pain), avoid sudden twisting movements.    For leg cramps, flexing your foot often brings immediate relief. You also may try massaging your calf in long, downward strokes, or stretching your legs before going to bed. Get enough exercise and wear shoes with flexible soles.  Third trimester tips  Reducing heartburn    Eat small, light meals throughout the day rather than 3 large ones.    Sleep with your upper body raised 6 inches. Don t lie down until 2 hours after you eat.    Don't eat greasy, fried, or spicy foods.    Avoid citrus fruits and juices.  Treating constipation    Eat foods high in fiber (whole-grain foods, fresh fruit and vegetables).    Drink plenty of water.    Get regular exercise.    Discuss other medicines (like docusate and psyllium) with your healthcare provider.  Taking care of your breasts    Avoid using harsh soaps or alcohol, which can cause excessive dryness.    Wear nursing bras. They provide more support than regular bras and can be used after pregnancy if you breastfeed.  Getting a good night s sleep    Take a warm shower before bed.    Sleep on a firm mattress.    Lie on your side with 1 leg crossed over the other.    Use pillows to support arms, legs, and belly.  Date Last Reviewed: 10/1/2017    3500-9234 [employer]. [address] [place] [address], [place] [address]. All rights reserved. This information is not intended as a substitute for professional medical care. Always follow your healthcare professional's instructions.           Patient Education     Pregnancy: Your Third Trimester  Changes  As the baby grows, your body changes too. You may also see signs that your body is getting ready for labor. Be patient. Within a few more weeks, your baby will be born.  How you are changing  Your body is preparing for the birth of your baby. Some of the most common changes are listed below. If you have any questions or concerns, ask your healthcare provider:    You ll gain more weight from fluids, extra blood, and fat deposits.    Your breasts will grow as your body gets ready to feed the baby. They may be more tender. You may also notice a slight yellow or white discharge from the nipples.    Discharge from your vagina may increase. This is normal.    You might see some skin color changes on your forehead, cheeks, or nose. Most of these will go away after you deliver.  How your baby is growing       Month 7  Your baby can open and close his or her eyes and weighs around 4 pounds. If born prematurely (too early), your baby would likely survive with special care. Month 8  Your baby is building up body fat and weighs around 6 pounds. Month 9  Your baby weighs nearly 7 pounds and is about 19 to 21 inches long. In other words, any day now...   Date Last Reviewed: 2/1/2018 2000-2019 [employer]. [address] [place] [address], [place] [address]. All rights reserved. This information is not intended as a substitute for professional medical care. Always follow your healthcare professional's instructions.

## 2020-04-25 VITALS
HEART RATE: 107 BPM | BODY MASS INDEX: 30.12 KG/M2 | SYSTOLIC BLOOD PRESSURE: 114 MMHG | DIASTOLIC BLOOD PRESSURE: 72 MMHG | WEIGHT: 175.49 LBS | TEMPERATURE: 97.1 F | OXYGEN SATURATION: 100 %

## 2020-04-25 NOTE — PROGRESS NOTES
[facility]- OBGYJUAN FRANCISCO    CC: routine prenatal visit.    S:Rebecca Fischer is a 34 year old  at 29w2d by LMP c/w 9w1d us who presents today for routine prenatal visit.  Patient reports she has been feeling good, but tired.  Patient denies any contractions, vaginal bleeding, leakage of fluid.  She states she is feeling normal fetal movement.  She denies any headache, changes in vision, chest pain, chest pressure, shortness of breath, nausea, vomiting, diarrhea, or constipation.      O: /72   Pulse 107   Temp 97.1  F (36.2  C) (Oral)   Wt 79.6 kg (175 lb 6.4 oz)   LMP 10/03/2019 (Within Weeks)   SpO2 100%   BMI 30.11 kg/m      Exam:  General- awake, alert, answering questions appropriately, appears comfortable  CV- regular rate  Lung- breathing comfortably on room air  Ext- bilateral lower extremities with trace edema    Doptones- 140 bpm  Fundal height-28 cm    A&P: Rebecca Fischer is a 34 year old  at 29w2d by LMP c/w 9w1d us who presents today for routine prenatal visit.    (Z34.83) Encounter for supervision of other normal pregnancy in third trimester  (primary encounter diagnosis)  Comment: Patient with previous findings of anemia.  Plan for evaluation with HgbELP.  Plans for peds- [facility].  Plans breast feeding.  States she has pump at home. Plans IUD for birth control.  Will bring leave paperwork to next appointment or send to clinic. Reviewed signs and symptoms of PTL, PPROM.    Plan: HGB Eval Reflex to ELP or RBC Solubility  Phone visit in 2 weeks  In person visit in 4 weeks    (Z23) Need for Tdap vaccination  Comment:Due for Tdap vaccine  Plan: TDAP VACCINE, VACCINE ADMINISTRATION, INITIAL    Sharron Manzanares MD

## 2020-04-26 LAB
HGB A1 MFR BLD: 97 % (ref 95–97.9)
HGB A2 MFR BLD: 2.7 % (ref 2–3.5)
HGB C MFR BLD: 0 % (ref 0–0)
HGB E MFR BLD: 0 % (ref 0–0)
HGB F MFR BLD: 0.3 % (ref 0–2.1)
HGB FRACT BLD ELPH-IMP: NORMAL
HGB OTHER MFR BLD: 0 % (ref 0–0)
HGB S BLD QL SOLY: NORMAL
HGB S MFR BLD: 0 % (ref 0–0)
PATH INTERP BLD-IMP: NORMAL

## 2020-05-06 ENCOUNTER — PRENATAL OFFICE VISIT (OUTPATIENT)
Dept: OBGYN | Facility: CLINIC | Age: 35
End: 2020-05-06
Payer: COMMERCIAL

## 2020-05-06 DIAGNOSIS — Z34.83 ENCOUNTER FOR SUPERVISION OF OTHER NORMAL PREGNANCY IN THIRD TRIMESTER: Primary | ICD-10-CM

## 2020-05-06 PROCEDURE — 99207 ZZC PRENATAL VISIT: CPT | Performed by: OBSTETRICS & GYNECOLOGY

## 2020-05-06 NOTE — PROGRESS NOTES
30w6d   Baby moving ok, a few random ctx's. Swelling up some which might explain her weight gain.  Working in ICU still with many covid pts.  Has not been assigned to covid pts.  discussed precautions.  She is considering mirena IUD, will be breast feeding and will check with insurance on the pump. Likely will do circ if boy.  discussed changes related to covid at the birthplace including universal screening now and option of IOL at 39 wks.  The patient's questions  were answered.  RR

## 2020-05-21 ENCOUNTER — PRENATAL OFFICE VISIT (OUTPATIENT)
Dept: OBGYN | Facility: CLINIC | Age: 35
End: 2020-05-21
Payer: COMMERCIAL

## 2020-05-21 VITALS
WEIGHT: 179.1 LBS | HEART RATE: 83 BPM | HEIGHT: 64 IN | SYSTOLIC BLOOD PRESSURE: 115 MMHG | BODY MASS INDEX: 30.58 KG/M2 | DIASTOLIC BLOOD PRESSURE: 68 MMHG | TEMPERATURE: 98 F

## 2020-05-21 DIAGNOSIS — Z34.83 ENCOUNTER FOR SUPERVISION OF OTHER NORMAL PREGNANCY IN THIRD TRIMESTER: Primary | ICD-10-CM

## 2020-05-21 DIAGNOSIS — O26.893 LOW BACK PAIN DURING PREGNANCY IN THIRD TRIMESTER: ICD-10-CM

## 2020-05-21 DIAGNOSIS — M54.50 LOW BACK PAIN DURING PREGNANCY IN THIRD TRIMESTER: ICD-10-CM

## 2020-05-21 PROCEDURE — 99207 ZZC PRENATAL VISIT: CPT | Performed by: OBSTETRICS & GYNECOLOGY

## 2020-05-21 ASSESSMENT — MIFFLIN-ST. JEOR: SCORE: 1507.39

## 2020-06-02 ENCOUNTER — TELEPHONE (OUTPATIENT)
Dept: PSYCHOLOGY | Facility: CLINIC | Age: 35
End: 2020-06-02

## 2020-06-02 ENCOUNTER — PRENATAL OFFICE VISIT (OUTPATIENT)
Dept: OBGYN | Facility: CLINIC | Age: 35
End: 2020-06-02
Payer: COMMERCIAL

## 2020-06-02 DIAGNOSIS — Z34.83 ENCOUNTER FOR SUPERVISION OF OTHER NORMAL PREGNANCY IN THIRD TRIMESTER: Primary | ICD-10-CM

## 2020-06-02 DIAGNOSIS — F41.9 ANXIETY: ICD-10-CM

## 2020-06-02 PROCEDURE — 99214 OFFICE O/P EST MOD 30 MIN: CPT | Mod: TEL | Performed by: OBSTETRICS & GYNECOLOGY

## 2020-06-02 RX ORDER — HYDROXYZINE HYDROCHLORIDE 25 MG/1
50 TABLET, FILM COATED ORAL 3 TIMES DAILY PRN
Qty: 60 TABLET | Refills: 1 | Status: ON HOLD | OUTPATIENT
Start: 2020-06-02 | End: 2020-07-06

## 2020-06-02 NOTE — PROGRESS NOTES
Telephone Encounter     Phone visit due to COVID.    Start time:1518  Stop time:1535    [facility]- JEANNE    CC:  anxiety    S: Patient reports that she is currently experiencing increased anxiety.  She is feeling stressed and overwhelmed.  She feels it is due to taking care of kids at home, her  in school, working nights, and current events.  She has a history of anxiety and used atarax and benzodiazepines in the past.  In high school she was on Wellbutrin for depression.  She denies any thoughts of hurting herself or others.  She states she feels safe at home.     A&P: Rebecca Fischer is a 34 year old  at 34w5d by LMP c/w 9w1d us who presents today to discuss anxiety.      (F41.9) Anxiety  Comment: Patient with acutely worsening anxiety.  Discussed pros and cons of starting serotonin specific reuptake inhibitor.  Discussed the risks of uncontrolled anxiety in pregnancy.  Recommend establishing care with counselor and referral placed (message sent) to Radha Gardner.  Patient will consider starting zoloft after discussion with her  and prn atarax prescribed for sleep/anxiety.  Also discussed meditation vernon use for anxiety and sleep. Patient will keep visit scheduled for 2020 with Dr. Ozuna.    Plan: sertraline (ZOLOFT) 50 MG tablet, hydrOXYzine         (ATARAX) 25 MG tablet    Sharron Manzanares MD

## 2020-06-02 NOTE — TELEPHONE ENCOUNTER
[facility] received referral from Dr. Sharron Manzanares due to an increase in stress and anxiety. Made first attempt at phone outreach. No answer, voicemail left with scheduling instructions.     [facility] to follow up with [employer] message.     Radha Gardner, [facility]  Behavioral Health Clinician

## 2020-06-05 ENCOUNTER — PRENATAL OFFICE VISIT (OUTPATIENT)
Dept: OBGYN | Facility: CLINIC | Age: 35
End: 2020-06-05
Payer: COMMERCIAL

## 2020-06-05 DIAGNOSIS — F41.9 ANXIETY: ICD-10-CM

## 2020-06-05 DIAGNOSIS — Z34.83 ENCOUNTER FOR SUPERVISION OF OTHER NORMAL PREGNANCY IN THIRD TRIMESTER: Primary | ICD-10-CM

## 2020-06-05 PROCEDURE — 99207 ZZC PRENATAL VISIT: CPT | Performed by: OBSTETRICS & GYNECOLOGY

## 2020-06-05 NOTE — LETTER
[address] [address]  [phone]    2020    Re: Rebecca Fischer  [address] [place] [id number]  [phone] (home)     : 1985      To Whom It May Concern:      Rebecca Fischer is currently 35 weeks pregnant and being seen in our clinic for her prenatal care. Due to advanced pregnancy and additional medical concerns, she will need to begin her maternity leave on 20.     Sincerely,        Bruna Ozuna MD

## 2020-06-05 NOTE — PROGRESS NOTES
35w1d  ~ telephone encounter   Patient complains of increasing difficulty with her pregnancy.  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    Her anxiety has escalated recently as well.  She has been taking atarax at night with slight improvement in sleep but will wake up too early and not be able to go back to sleep.   Her  is part - [employer] and with recent local events, he has been suffering from chest pain which was worked up and noted to be likely anxiety.   Discussed management options for her anxiety.  Suspect that if she begins maternity leave now, she might have less stress and more time to care for herself,  and 4 children.   She would like a letter for work to being maternity leave at this time.  Letter written today. F/u in clinic in one week for GBS.  RR

## 2020-06-08 ENCOUNTER — MYC MEDICAL ADVICE (OUTPATIENT)
Dept: OBGYN | Facility: CLINIC | Age: 35
End: 2020-06-08

## 2020-06-08 NOTE — TELEPHONE ENCOUNTER
[facility] spoke with patient. Telephone visit scheduled for 6/10 at 9:30 am    BRIAN Solis

## 2020-06-11 ENCOUNTER — PATIENT OUTREACH (OUTPATIENT)
Dept: ONCOLOGY | Facility: CLINIC | Age: 35
End: 2020-06-11

## 2020-06-11 ENCOUNTER — PRENATAL OFFICE VISIT (OUTPATIENT)
Dept: OBGYN | Facility: CLINIC | Age: 35
End: 2020-06-11
Payer: COMMERCIAL

## 2020-06-11 VITALS
HEIGHT: 64 IN | DIASTOLIC BLOOD PRESSURE: 77 MMHG | HEART RATE: 85 BPM | WEIGHT: 185.3 LBS | BODY MASS INDEX: 31.63 KG/M2 | TEMPERATURE: 96.8 F | SYSTOLIC BLOOD PRESSURE: 125 MMHG

## 2020-06-11 DIAGNOSIS — Z34.83 ENCOUNTER FOR SUPERVISION OF OTHER NORMAL PREGNANCY IN THIRD TRIMESTER: Primary | ICD-10-CM

## 2020-06-11 DIAGNOSIS — D64.9 ANEMIA, UNSPECIFIED TYPE: ICD-10-CM

## 2020-06-11 LAB
CAPILLARY BLOOD COLLECTION: NORMAL
HGB BLD-MCNC: 11.7 G/DL (ref 11.7–15.7)

## 2020-06-11 PROCEDURE — 00000218 ZZHCL STATISTIC OBHBG - HEMOGLOBIN: Performed by: OBSTETRICS & GYNECOLOGY

## 2020-06-11 PROCEDURE — 87186 SC STD MICRODIL/AGAR DIL: CPT | Performed by: OBSTETRICS & GYNECOLOGY

## 2020-06-11 PROCEDURE — 99207 ZZC PRENATAL VISIT: CPT | Performed by: OBSTETRICS & GYNECOLOGY

## 2020-06-11 PROCEDURE — 36416 COLLJ CAPILLARY BLOOD SPEC: CPT | Performed by: OBSTETRICS & GYNECOLOGY

## 2020-06-11 PROCEDURE — 87653 STREP B DNA AMP PROBE: CPT | Performed by: OBSTETRICS & GYNECOLOGY

## 2020-06-11 ASSESSMENT — MIFFLIN-ST. JEOR: SCORE: 1535.52

## 2020-06-11 ASSESSMENT — PATIENT HEALTH QUESTIONNAIRE - PHQ9: SUM OF ALL RESPONSES TO PHQ QUESTIONS 1-9: 12

## 2020-06-11 NOTE — PROGRESS NOTES
36w0d  Anxiety improving a little, still has trouble waking up at night and not falling back to sleep. Baby is moving a lot.  Cephalic on bedside US.   occ ctx's but nothing regular.  Cervix is closed today. We discussed IOL again and she would like to get scheduled for 39-40 wks.    Reviewed the induction process in length today.  She has been chronically anemic at ~ 10.5 range despite normal iron studies.  We discussed this today and that possibly there is a thalassemia trait that is not picked up on the routine hgb ELP.  She would like to meet with a hematologist to discuss this further.  Referral placed.  GBS and hgb done today. RR

## 2020-06-11 NOTE — PROGRESS NOTES
New Patient Oncology Nurse Navigator Note     Referring provider: Bruna Ozuna    Referring Clinic/Organization: [facility]     Referred to: Benign Hematology    Requested provider (if applicable): First available - did not specify     Referral Received: 06/11/20       Evaluation for : Anemia     Clinical History (per Nurse review of records provided):       She has been chronically anemic at ~ 10.5 range despite normal iron studies.  We discussed this today and that possibly there is a thalassemia trait that is not picked up on the routine hgb ELP.  She would like to meet with a hematologist to discuss this further.    Clinical Assessment / Barriers to Care (Per Nurse):    36 weeks pregnant,   Hgb is up today       Records Location: [facility]     Records Needed:     none    Additional testing needed prior to consult:     None    PLAN:  Schedule at [facility] with benign hematologist, use anemia protocol     Nadege Leal LPN

## 2020-06-12 ENCOUNTER — TELEPHONE (OUTPATIENT)
Dept: ONCOLOGY | Facility: CLINIC | Age: 35
End: 2020-06-12

## 2020-06-12 LAB
GP B STREP DNA SPEC QL NAA+PROBE: POSITIVE
SPECIMEN SOURCE: ABNORMAL

## 2020-06-12 NOTE — TELEPHONE ENCOUNTER
Left vml for pt to contact scheduling for epic referral and IB msg. (See below)      Pls. Schedule for 7/2.       Medina antoine for Nadege

## 2020-06-12 NOTE — TELEPHONE ENCOUNTER
ONCOLOGY INTAKE: Records Information      APPT INFORMATION:  Referring provider:  Dr. Bruna Ozuna  Referring provider s clinic:  [facility]  Reason for visit/diagnosis:     Anemia, unspecified type [D64.9]        Has patient been notified of appointment date and time?: NA    RECORDS INFORMATION:  Were the records received with the referral (via [employer])? Internal Referral    ADDITIONAL INFORMATION:  Left VM with hours and phone. Will try again on 15JUN20 if Pt has not been scheduled. Referral in [facility].

## 2020-06-15 ENCOUNTER — TELEPHONE (OUTPATIENT)
Dept: OBGYN | Facility: CLINIC | Age: 35
End: 2020-06-15

## 2020-06-15 NOTE — LETTER
[address] [address]  [phone]      Elaine 15, 2020      Rebecca Fischer  [facility] [address] [facility] [address]              To Whom It May Concern:    Rebecca Fischer is being seen in our clinic for prenatal care.  Her Estimated Date of Delivery: 2020.  Her partner, Hayden Fischer, is requesting [facility] time to care for her and their  during the summer session.  He will return to school in the fall.    If you have any questions, you can contact my office by calling [phone].      Sincerely,            Bruna Ozuna MD

## 2020-06-15 NOTE — TELEPHONE ENCOUNTER
Pt requesting a letter for her  for leave for the summer session of school.  Letter typed up and in Dr. Ozuna's box to sign.  Pt will pick it up at her appt on 6/25/20.  Anabela Jauregui RN

## 2020-06-16 LAB
BACTERIA SPEC CULT: ABNORMAL
SPECIMEN SOURCE: ABNORMAL

## 2020-06-18 ENCOUNTER — PRENATAL OFFICE VISIT (OUTPATIENT)
Dept: OBGYN | Facility: CLINIC | Age: 35
End: 2020-06-18
Payer: COMMERCIAL

## 2020-06-18 DIAGNOSIS — Z34.80 SUPERVISION OF OTHER NORMAL PREGNANCY, ANTEPARTUM: Primary | ICD-10-CM

## 2020-06-18 PROCEDURE — 99207 ZZC PRENATAL VISIT: CPT | Performed by: OBSTETRICS & GYNECOLOGY

## 2020-06-18 RX ORDER — ACETAMINOPHEN 325 MG/1
650 TABLET ORAL EVERY 4 HOURS PRN
Status: CANCELLED | OUTPATIENT
Start: 2020-06-18

## 2020-06-18 RX ORDER — NALOXONE HYDROCHLORIDE 0.4 MG/ML
.1-.4 INJECTION, SOLUTION INTRAMUSCULAR; INTRAVENOUS; SUBCUTANEOUS
Status: CANCELLED | OUTPATIENT
Start: 2020-06-18

## 2020-06-18 RX ORDER — OXYTOCIN/0.9 % SODIUM CHLORIDE 30/500 ML
100-340 PLASTIC BAG, INJECTION (ML) INTRAVENOUS CONTINUOUS PRN
Status: CANCELLED | OUTPATIENT
Start: 2020-06-18

## 2020-06-18 RX ORDER — SODIUM CHLORIDE, SODIUM LACTATE, POTASSIUM CHLORIDE, CALCIUM CHLORIDE 600; 310; 30; 20 MG/100ML; MG/100ML; MG/100ML; MG/100ML
INJECTION, SOLUTION INTRAVENOUS CONTINUOUS
Status: CANCELLED | OUTPATIENT
Start: 2020-06-18

## 2020-06-18 RX ORDER — IBUPROFEN 200 MG
800 TABLET ORAL
Status: CANCELLED | OUTPATIENT
Start: 2020-06-18

## 2020-06-18 RX ORDER — OXYTOCIN 10 [USP'U]/ML
10 INJECTION, SOLUTION INTRAMUSCULAR; INTRAVENOUS
Status: CANCELLED | OUTPATIENT
Start: 2020-06-18

## 2020-06-18 RX ORDER — OXYCODONE AND ACETAMINOPHEN 5; 325 MG/1; MG/1
1 TABLET ORAL
Status: CANCELLED | OUTPATIENT
Start: 2020-06-18

## 2020-06-18 RX ORDER — CARBOPROST TROMETHAMINE 250 UG/ML
250 INJECTION, SOLUTION INTRAMUSCULAR
Status: CANCELLED | OUTPATIENT
Start: 2020-06-18

## 2020-06-18 RX ORDER — ONDANSETRON 2 MG/ML
4 INJECTION INTRAMUSCULAR; INTRAVENOUS EVERY 6 HOURS PRN
Status: CANCELLED | OUTPATIENT
Start: 2020-06-18

## 2020-06-18 RX ORDER — METHYLERGONOVINE MALEATE 0.2 MG/ML
200 INJECTION INTRAVENOUS
Status: CANCELLED | OUTPATIENT
Start: 2020-06-18

## 2020-06-18 NOTE — PROGRESS NOTES
Phone visit for modified prenatal care for COVID.  Call time 10 minutes.  Feels well, fetus active.  Discussed GBS +, PCN (she was + in all other pregnancies).  RTC 1 week.  Labor orders signed and held.  AM

## 2020-06-24 ENCOUNTER — NURSE TRIAGE (OUTPATIENT)
Dept: NURSING | Facility: CLINIC | Age: 35
End: 2020-06-24

## 2020-06-24 NOTE — TELEPHONE ENCOUNTER
Patient calling - says she was told to call the scheduling line to answer pre-screening questions for her OB appointment tomorrow.  Warm transferred to scheduling line.    Evie Meng, RN  Triage Nurse Advisor    Additional Information    Question about upcoming scheduled test, no triage required and triager able to answer question    Protocols used: INFORMATION ONLY CALL-A-AH

## 2020-06-25 ENCOUNTER — PRENATAL OFFICE VISIT (OUTPATIENT)
Dept: OBGYN | Facility: CLINIC | Age: 35
End: 2020-06-25
Payer: COMMERCIAL

## 2020-06-25 VITALS
HEART RATE: 91 BPM | TEMPERATURE: 98.5 F | HEIGHT: 64 IN | SYSTOLIC BLOOD PRESSURE: 134 MMHG | WEIGHT: 191 LBS | DIASTOLIC BLOOD PRESSURE: 79 MMHG | BODY MASS INDEX: 32.61 KG/M2

## 2020-06-25 DIAGNOSIS — Z34.83 ENCOUNTER FOR SUPERVISION OF OTHER NORMAL PREGNANCY IN THIRD TRIMESTER: Primary | ICD-10-CM

## 2020-06-25 PROCEDURE — 99207 ZZC PRENATAL VISIT: CPT | Performed by: OBSTETRICS & GYNECOLOGY

## 2020-06-25 ASSESSMENT — MIFFLIN-ST. JEOR: SCORE: 1561.37

## 2020-06-26 NOTE — TELEPHONE ENCOUNTER
RECORDS STATUS - ALL OTHER DIAGNOSIS      RECORDS RECEIVED FROM: [facility] - Internal Referral   DATE RECEIVED: 6/26/20   NOTES STATUS DETAILS   OFFICE NOTE from referring provider Epic    OFFICE NOTE from medical oncologist     DISCHARGE SUMMARY from hospital     DISCHARGE REPORT from the ER     OPERATIVE REPORT     MEDICATION LIST     CLINICAL TRIAL TREATMENTS TO DATE     LABS     PATHOLOGY REPORTS     ANYTHING RELATED TO DIAGNOSIS Epic 6/18/20   GENONOMIC TESTING     TYPE:     IMAGING (NEED IMAGES & REPORT)     CT SCANS     MRI     MAMMO     ULTRASOUND     PET

## 2020-06-30 ENCOUNTER — PRENATAL OFFICE VISIT (OUTPATIENT)
Dept: OBGYN | Facility: CLINIC | Age: 35
End: 2020-06-30
Payer: COMMERCIAL

## 2020-06-30 VITALS
DIASTOLIC BLOOD PRESSURE: 76 MMHG | BODY MASS INDEX: 32.81 KG/M2 | SYSTOLIC BLOOD PRESSURE: 115 MMHG | HEART RATE: 84 BPM | HEIGHT: 64 IN | TEMPERATURE: 98 F | WEIGHT: 192.2 LBS

## 2020-06-30 DIAGNOSIS — Z34.80 SUPERVISION OF OTHER NORMAL PREGNANCY, ANTEPARTUM: Primary | ICD-10-CM

## 2020-06-30 PROCEDURE — 99207 ZZC PRENATAL VISIT: CPT | Performed by: OBSTETRICS & GYNECOLOGY

## 2020-06-30 ASSESSMENT — MIFFLIN-ST. JEOR: SCORE: 1566.81

## 2020-06-30 NOTE — PROGRESS NOTES
38w5d  No complaints.  Baby is moving well. Intermittent ctx's.  Would like IOL at 39 wks.    Carranza's score is 5 today.  discussed what to expect with IOL.  She is GBS positive with h/o rapid labors.    Will plan for IOL next week.  RR

## 2020-07-02 DIAGNOSIS — Z34.83 ENCOUNTER FOR SUPERVISION OF OTHER NORMAL PREGNANCY IN THIRD TRIMESTER: ICD-10-CM

## 2020-07-02 PROCEDURE — U0003 INFECTIOUS AGENT DETECTION BY NUCLEIC ACID (DNA OR RNA); SEVERE ACUTE RESPIRATORY SYNDROME CORONAVIRUS 2 (SARS-COV-2) (CORONAVIRUS DISEASE [COVID-19]), AMPLIFIED PROBE TECHNIQUE, MAKING USE OF HIGH THROUGHPUT TECHNOLOGIES AS DESCRIBED BY CMS-2020-01-R: HCPCS | Performed by: OBSTETRICS & GYNECOLOGY

## 2020-07-03 LAB
SARS-COV-2 RNA SPEC QL NAA+PROBE: NOT DETECTED
SPECIMEN SOURCE: NORMAL

## 2020-07-05 ENCOUNTER — HOSPITAL ENCOUNTER (INPATIENT)
Facility: CLINIC | Age: 35
LOS: 3 days | Discharge: HOME | End: 2020-07-08
Attending: OBSTETRICS & GYNECOLOGY | Admitting: OBSTETRICS & GYNECOLOGY
Payer: COMMERCIAL

## 2020-07-05 LAB
ABO + RH BLD: NORMAL
ABO + RH BLD: NORMAL
BASOPHILS # BLD AUTO: 0 10E9/L (ref 0–0.2)
BASOPHILS NFR BLD AUTO: 0.3 %
BLD GP AB SCN SERPL QL: NORMAL
BLOOD BANK CMNT PATIENT-IMP: NORMAL
DIFFERENTIAL METHOD BLD: ABNORMAL
EOSINOPHIL # BLD AUTO: 0.1 10E9/L (ref 0–0.7)
EOSINOPHIL NFR BLD AUTO: 0.8 %
ERYTHROCYTE [DISTWIDTH] IN BLOOD BY AUTOMATED COUNT: 13 % (ref 10–15)
HCT VFR BLD AUTO: 35.4 % (ref 35–47)
HGB BLD-MCNC: 11.6 G/DL (ref 11.7–15.7)
IMM GRANULOCYTES # BLD: 0.2 10E9/L (ref 0–0.4)
IMM GRANULOCYTES NFR BLD: 1.4 %
LYMPHOCYTES # BLD AUTO: 2.1 10E9/L (ref 0.8–5.3)
LYMPHOCYTES NFR BLD AUTO: 18.9 %
MCH RBC QN AUTO: 29.7 PG (ref 26.5–33)
MCHC RBC AUTO-ENTMCNC: 32.8 G/DL (ref 31.5–36.5)
MCV RBC AUTO: 91 FL (ref 78–100)
MONOCYTES # BLD AUTO: 0.9 10E9/L (ref 0–1.3)
MONOCYTES NFR BLD AUTO: 7.7 %
NEUTROPHILS # BLD AUTO: 7.8 10E9/L (ref 1.6–8.3)
NEUTROPHILS NFR BLD AUTO: 70.9 %
NRBC # BLD AUTO: 0 10*3/UL
NRBC BLD AUTO-RTO: 0 /100
PLATELET # BLD AUTO: 176 10E9/L (ref 150–450)
RBC # BLD AUTO: 3.9 10E12/L (ref 3.8–5.2)
SPECIMEN EXP DATE BLD: NORMAL
WBC # BLD AUTO: 11 10E9/L (ref 4–11)

## 2020-07-05 PROCEDURE — 85025 COMPLETE CBC W/AUTO DIFF WBC: CPT | Performed by: OBSTETRICS & GYNECOLOGY

## 2020-07-05 PROCEDURE — 25000132 ZZH RX MED GY IP 250 OP 250 PS 637: Performed by: STUDENT IN AN ORGANIZED HEALTH CARE EDUCATION/TRAINING PROGRAM

## 2020-07-05 PROCEDURE — 3E0P7VZ INTRODUCTION OF HORMONE INTO FEMALE REPRODUCTIVE, VIA NATURAL OR ARTIFICIAL OPENING: ICD-10-PCS | Performed by: OBSTETRICS & GYNECOLOGY

## 2020-07-05 PROCEDURE — 86850 RBC ANTIBODY SCREEN: CPT | Performed by: OBSTETRICS & GYNECOLOGY

## 2020-07-05 PROCEDURE — 86900 BLOOD TYPING SEROLOGIC ABO: CPT | Performed by: OBSTETRICS & GYNECOLOGY

## 2020-07-05 PROCEDURE — 86901 BLOOD TYPING SEROLOGIC RH(D): CPT | Performed by: OBSTETRICS & GYNECOLOGY

## 2020-07-05 PROCEDURE — 25800030 ZZH RX IP 258 OP 636: Performed by: OBSTETRICS & GYNECOLOGY

## 2020-07-05 PROCEDURE — 59200 INSERT CERVICAL DILATOR: CPT | Mod: GC | Performed by: OBSTETRICS & GYNECOLOGY

## 2020-07-05 PROCEDURE — 86780 TREPONEMA PALLIDUM: CPT | Performed by: OBSTETRICS & GYNECOLOGY

## 2020-07-05 PROCEDURE — 25000128 H RX IP 250 OP 636: Performed by: OBSTETRICS & GYNECOLOGY

## 2020-07-05 PROCEDURE — 12000001 ZZH R&B MED SURG/OB UMMC

## 2020-07-05 PROCEDURE — 25000128 H RX IP 250 OP 636: Performed by: STUDENT IN AN ORGANIZED HEALTH CARE EDUCATION/TRAINING PROGRAM

## 2020-07-05 PROCEDURE — 25000132 ZZH RX MED GY IP 250 OP 250 PS 637: Performed by: OBSTETRICS & GYNECOLOGY

## 2020-07-05 RX ORDER — METHYLERGONOVINE MALEATE 0.2 MG/ML
200 INJECTION INTRAVENOUS
Status: DISCONTINUED | OUTPATIENT
Start: 2020-07-05 | End: 2020-07-06

## 2020-07-05 RX ORDER — NALOXONE HYDROCHLORIDE 0.4 MG/ML
.1-.4 INJECTION, SOLUTION INTRAMUSCULAR; INTRAVENOUS; SUBCUTANEOUS
Status: DISCONTINUED | OUTPATIENT
Start: 2020-07-05 | End: 2020-07-06

## 2020-07-05 RX ORDER — PENICILLIN G POTASSIUM 5000000 [IU]/1
5 INJECTION, POWDER, FOR SOLUTION INTRAMUSCULAR; INTRAVENOUS ONCE
Status: COMPLETED | OUTPATIENT
Start: 2020-07-05 | End: 2020-07-05

## 2020-07-05 RX ORDER — OXYTOCIN 10 [USP'U]/ML
10 INJECTION, SOLUTION INTRAMUSCULAR; INTRAVENOUS
Status: DISCONTINUED | OUTPATIENT
Start: 2020-07-05 | End: 2020-07-06

## 2020-07-05 RX ORDER — OXYTOCIN/0.9 % SODIUM CHLORIDE 30/500 ML
100-340 PLASTIC BAG, INJECTION (ML) INTRAVENOUS CONTINUOUS PRN
Status: COMPLETED | OUTPATIENT
Start: 2020-07-05 | End: 2020-07-06

## 2020-07-05 RX ORDER — MORPHINE SULFATE 10 MG/ML
10 INJECTION, SOLUTION INTRAMUSCULAR; INTRAVENOUS ONCE
Status: COMPLETED | OUTPATIENT
Start: 2020-07-05 | End: 2020-07-05

## 2020-07-05 RX ORDER — HYDROXYZINE HYDROCHLORIDE 25 MG/1
50 TABLET, FILM COATED ORAL EVERY 6 HOURS PRN
Status: DISCONTINUED | OUTPATIENT
Start: 2020-07-05 | End: 2020-07-06

## 2020-07-05 RX ORDER — HYDROXYZINE HYDROCHLORIDE 25 MG/1
100 TABLET, FILM COATED ORAL EVERY 6 HOURS PRN
Status: DISCONTINUED | OUTPATIENT
Start: 2020-07-05 | End: 2020-07-06

## 2020-07-05 RX ORDER — SODIUM CHLORIDE, SODIUM LACTATE, POTASSIUM CHLORIDE, CALCIUM CHLORIDE 600; 310; 30; 20 MG/100ML; MG/100ML; MG/100ML; MG/100ML
INJECTION, SOLUTION INTRAVENOUS CONTINUOUS
Status: DISCONTINUED | OUTPATIENT
Start: 2020-07-05 | End: 2020-07-06

## 2020-07-05 RX ORDER — CARBOPROST TROMETHAMINE 250 UG/ML
250 INJECTION, SOLUTION INTRAMUSCULAR
Status: DISCONTINUED | OUTPATIENT
Start: 2020-07-05 | End: 2020-07-06

## 2020-07-05 RX ORDER — ONDANSETRON 2 MG/ML
4 INJECTION INTRAMUSCULAR; INTRAVENOUS EVERY 6 HOURS PRN
Status: DISCONTINUED | OUTPATIENT
Start: 2020-07-05 | End: 2020-07-06

## 2020-07-05 RX ORDER — ACETAMINOPHEN 325 MG/1
650 TABLET ORAL EVERY 4 HOURS PRN
Status: DISCONTINUED | OUTPATIENT
Start: 2020-07-05 | End: 2020-07-06

## 2020-07-05 RX ORDER — MISOPROSTOL 100 UG/1
25 TABLET ORAL EVERY 4 HOURS PRN
Status: DISCONTINUED | OUTPATIENT
Start: 2020-07-05 | End: 2020-07-06

## 2020-07-05 RX ORDER — HYDROXYZINE HYDROCHLORIDE 50 MG/ML
100 INJECTION, SOLUTION INTRAMUSCULAR ONCE
Status: DISCONTINUED | OUTPATIENT
Start: 2020-07-05 | End: 2020-07-06

## 2020-07-05 RX ORDER — OXYCODONE AND ACETAMINOPHEN 5; 325 MG/1; MG/1
1 TABLET ORAL
Status: DISCONTINUED | OUTPATIENT
Start: 2020-07-05 | End: 2020-07-06

## 2020-07-05 RX ORDER — IBUPROFEN 800 MG/1
800 TABLET, FILM COATED ORAL
Status: COMPLETED | OUTPATIENT
Start: 2020-07-05 | End: 2020-07-06

## 2020-07-05 RX ADMIN — MORPHINE SULFATE 10 MG: 10 INJECTION INTRAVENOUS at 22:20

## 2020-07-05 RX ADMIN — PENICILLIN G POTASSIUM 2.5 MILLION UNITS: 20000000 INJECTION, POWDER, FOR SOLUTION INTRAMUSCULAR; INTRAVENOUS at 21:26

## 2020-07-05 RX ADMIN — PENICILLIN G POTASSIUM 5 MILLION UNITS: 5000000 POWDER, FOR SOLUTION INTRAMUSCULAR; INTRAPLEURAL; INTRATHECAL; INTRAVENOUS at 13:37

## 2020-07-05 RX ADMIN — Medication 25 MCG: at 17:34

## 2020-07-05 RX ADMIN — Medication 25 MCG: at 21:25

## 2020-07-05 RX ADMIN — SODIUM CHLORIDE, POTASSIUM CHLORIDE, SODIUM LACTATE AND CALCIUM CHLORIDE 500 ML: 600; 310; 30; 20 INJECTION, SOLUTION INTRAVENOUS at 21:42

## 2020-07-05 RX ADMIN — SODIUM CHLORIDE, POTASSIUM CHLORIDE, SODIUM LACTATE AND CALCIUM CHLORIDE: 600; 310; 30; 20 INJECTION, SOLUTION INTRAVENOUS at 13:36

## 2020-07-05 RX ADMIN — PENICILLIN G POTASSIUM 2.5 MILLION UNITS: 20000000 INJECTION, POWDER, FOR SOLUTION INTRAMUSCULAR; INTRAVENOUS at 17:39

## 2020-07-05 RX ADMIN — Medication 25 MCG: at 13:24

## 2020-07-05 RX ADMIN — HYDROXYZINE HYDROCHLORIDE 100 MG: 50 TABLET, FILM COATED ORAL at 22:06

## 2020-07-05 NOTE — PROGRESS NOTES
[facility]  Labor Progress Note    Subjective:  Patient doing well. Feeling some contractions.    Objective:   Patient Vitals for the past 4 hrs:   BP Temp Temp src Resp   20 1558 118/69 98  F (36.7  C) Oral 18     SVE: 2.5/30/-3    FHT: Baseline 135, moderate variability, present accelerations, absent decelerations  [place]: 2-3 contractions in 10 minutes    Assessment/Plan:  Ms. Rebecca Fischer is a 34 year old , at 39w3d by LMP c/w 9w1d US, who presents for elective IOL. Pregnancy complicated by GBS positive status.    #IOL  - Small amount of cervical change, give another PV miso now  - GBS positive, PCN running  - Pain: per pt request  - Anticipate      #FWB  - Cat I FHT  - Continuous monitoring    Dr. Ozuna updated    Tan Hernandez MD  OB/GYN Resident, PGY-3  2020 5:38 PM

## 2020-07-05 NOTE — H&P
[facility]  OB History and Physical      Rebecca Fischer MRN# [id number]   Age: 34 year old YOB: 1985     CC: Induction of labor    HPI:  Ms. Rebecca Fischer is a 34 year old  at 39w3d by LMP c/w 9w1d US, who presents for elective IOl. She reports occasional contractions. Denies vaginal bleeding and loss of fluid. Active fetal movement. No additional concerns.    Pregnancy Complications:  - GBS positive status    Prenatal Labs:   Lab Results   Component Value Date    ABO A 2020    RH Pos 2020    AS Neg 2020    HEPBANG Nonreactive 2019    HGB 11.6 (L) 2020       GBS Status:   Lab Results   Component Value Date    GBS Positive (A) 2020       OB History  OB History    Para Term  AB Living   6 4 4 0 1 4   SAB TAB Ectopic Multiple Live Births   1 0 0 0 4      # Outcome Date GA Lbr [place]/2nd Weight Sex Delivery Anes PTL Lv   6 Current            5 Term 18 39w0d  3.402 kg (7 lb 8 oz) F Induction EPI  DONAVAN      Birth Comments: heart rate dropped during epidural      Complications: Anesthetic Complications      Name: Clara   4 Term 17 40w0d  4.037 kg (8 lb 14.4 oz) F Induction   DONAVAN      Name: Louise   3 SAB 2017 7w0d          2 Term 10/21/11 40w0d  3.674 kg (8 lb 1.6 oz) F    DONAVAN      Name: Valarie   1 Term 09 40w0d  3.856 kg (8 lb 8 oz) F Induction   DONAVAN      Name: Dallas      Obstetric Comments   Oldest 2 were born in [place].    Then regions for the 3rd and last one [facility].    PPH with first baby.     Last baby induced for maternal request   Second delivery was fast.        PMHx:   Past Medical History:   Diagnosis Date     Anemia     on supplemental iron     NO ACTIVE PROBLEMS      PSHx:   Past Surgical History:   Procedure Laterality Date     C BREAST AUGMENTATION       Meds:   Medications Prior to Admission   Medication Sig Dispense Refill Last Dose     Acetaminophen (TYLENOL PO)     Past Month at Unknown time     ferrous sulfate (FEROSUL) 325 (65 Fe) MG tablet Take 325 mg by mouth daily (with breakfast)   2020 at 0800     hydrOXYzine (ATARAX) 25 MG tablet Take 2 tablets (50 mg) by mouth 3 times daily as needed for anxiety 60 tablet 1 Past Week at Unknown time     Prenatal Vit-Fe Fumarate-FA (PRENATAL VITAMIN AND MINERAL PO)    2020 at 2200     ECHINACEA EXTRACT PO    More than a month at Unknown time     Multiple Vitamins-Minerals (EMERGEN-C IMMUNE PO)    More than a month at Unknown time     sertraline (ZOLOFT) 50 MG tablet Take 1 tablet (50 mg) by mouth daily (Patient not taking: Reported on 2020) 100 tablet 1      UNABLE TO FIND MEDICATION NAME: vitamin d3 magnesium        vitamin B6 (PYRIDOXINE) 25 MG tablet Take 25 mg by mouth daily        Allergies:  No Known Allergies     FmHx: No family history on file.     SocHx: She denies any tobacco, alcohol, or other drug use during this pregnancy.    ROS:   Complete 10-point ROS negative except as noted in HPI.    PE:  Vit:   Patient Vitals for the past 4 hrs:   BP Temp Temp src Resp   20 1558 118/69 98  F (36.7  C) Oral 18      Gen: Well-appearing, NAD, comfortable   CV: Well perfused  Pulm: Non-labored breathing  Abd: Soft, gravid, non-tender  Ext: Trace LE edema b/l    Cx: 50/-3 per Dr. Ozuna    Pres:  Ceph by BSUS  EFW:  7.5# by [place]  Memb: Intact              FHT: Baseline 135, moderate variability, accelerations present, absent decelerations, reactive NST   [place]: 1-2 contractions in 10 minutes      Assessment  Ms. Rebecca Fischer is a 34 year old , at 39w3d by LMP c/w 9w1d US, who presents for elective IOL. Pregnancy complicated by GBS positive status.    Plan  #IOL  - Admit to L&D, routine labs ordered  - Cervix unfavorable, start with PV miso  - GBS positive, start PCN now due to h/o rapid labors  - Pain: per pt request  - Anticipate     #FWB  - Cat I FHT  - Continuous monitoring    The patient was  discussed with Dr. Ozuna who is in agreement with the treatment plan.    Tan Hernandez MD  OB/GYN Resident, PGY-3  7/5/2020 4:40 PM        Physician Attestation   I, Bruna Ozuna MD, personally examined and evaluated this patient.  I discussed the patient with the resident and care team, and agree with the assessment and plan of care as documented in the note above.   I personally reviewed vital signs, medications, labs, fetal heart tones and toco.  Key findings: Patient is here for elective IOL, multip with favorable cervix and h/o fast labors. Fetal status is reassuring with a reactive NST. She is megha spontaneously about q 10 min. After review of options we decided to go with miso to avoid IV's initially. She will be given first dose of PCN now as she has a h/o fast labors.    Procedure:   cytotec placed in posterior fornix at 1325 without complication.  Patient tolerated it well.      Bruna Ozuna MD   July 6, 2020   Patient seen at 1315

## 2020-07-05 NOTE — PLAN OF CARE
Data: Patient presented to [facility] at 1116.   Reason for maternal/fetal assessment per patient is Induction Of Labor  .  Patient is a . Prenatal record reviewed.      OB History    Para Term  AB Living   6 4 4 0 1 4   SAB TAB Ectopic Multiple Live Births   1 0 0 0 4      # Outcome Date GA Lbr [place]/2nd Weight Sex Delivery Anes PTL Lv   6 Current            5 Term 18 39w0d  3.402 kg (7 lb 8 oz) F Induction EPI  DONAVAN      Birth Comments: heart rate dropped during epidural      Complications: Anesthetic Complications      Name: Clara   4 Term 17 40w0d  4.037 kg (8 lb 14.4 oz) F Induction   DONAVAN      Name: Louise   3 SAB 2017 7w0d          2 Term 10/21/11 40w0d  3.674 kg (8 lb 1.6 oz) F    DONAVAN      Name: Valarie   1 Term 09 40w0d  3.856 kg (8 lb 8 oz) F Induction   DONAVAN      Name: [facility]      Obstetric Comments   Oldest 2 were born in [place].    Then regions for the 3rd and last one [facility].    PPH with first baby.     Last baby induced for maternal request   Second delivery was fast.    . Medical history:   Past Medical History:   Diagnosis Date     Anemia     on supplemental iron     NO ACTIVE PROBLEMS    . Gestational Age 39w3d. VSS. Fetal movement present. Patient denies backache, vaginal discharge, pelvic pressure, UTI symptoms, GI problems, bloody show, vaginal bleeding, edema, headache, visual disturbances, epigastric or URQ pain, abdominal pain, rupture of membranes. Support person, Hayden, present.  Action: Verbal consent for EFM. Triage assessment completed. EFM applied upon arrival. Uterine assessment showed occasional contractions. Fetal assessment: Presumed adequate fetal oxygenation documented (see flow record).   Response: Dr. Ozuna and Dr. Hernandez informed of patients arrival. Plan per provider is to admit patient for induction of labor and start with misoprostol administration. Penicillin started for gbs. Patient verbalized agreement with plan.  Patient transferred to room 444 ambulatory, oriented to room and call light.

## 2020-07-05 NOTE — PROVIDER NOTIFICATION
20 1200   Provider Notification   Provider Name/Title G3 Pager   Method of Notification Electronic Page   Request Evaluate in Person   Notification Reason Patient Arrived     Page Sent: Pt arrived, elective IOL. 39w3d, . GBS+. Denies LOF/bleeding.

## 2020-07-06 ENCOUNTER — ANESTHESIA EVENT (OUTPATIENT)
Dept: OBGYN | Facility: CLINIC | Age: 35
End: 2020-07-06
Payer: COMMERCIAL

## 2020-07-06 ENCOUNTER — ANESTHESIA (OUTPATIENT)
Dept: OBGYN | Facility: CLINIC | Age: 35
End: 2020-07-06
Payer: COMMERCIAL

## 2020-07-06 LAB — T PALLIDUM AB SER QL: NONREACTIVE

## 2020-07-06 PROCEDURE — 3E0R3BZ INTRODUCTION OF ANESTHETIC AGENT INTO SPINAL CANAL, PERCUTANEOUS APPROACH: ICD-10-PCS | Performed by: STUDENT IN AN ORGANIZED HEALTH CARE EDUCATION/TRAINING PROGRAM

## 2020-07-06 PROCEDURE — 25000132 ZZH RX MED GY IP 250 OP 250 PS 637: Performed by: STUDENT IN AN ORGANIZED HEALTH CARE EDUCATION/TRAINING PROGRAM

## 2020-07-06 PROCEDURE — 72200001 ZZH LABOR CARE VAGINAL DELIVERY SINGLE

## 2020-07-06 PROCEDURE — 25800030 ZZH RX IP 258 OP 636: Performed by: STUDENT IN AN ORGANIZED HEALTH CARE EDUCATION/TRAINING PROGRAM

## 2020-07-06 PROCEDURE — 25000128 H RX IP 250 OP 636: Performed by: STUDENT IN AN ORGANIZED HEALTH CARE EDUCATION/TRAINING PROGRAM

## 2020-07-06 PROCEDURE — 25000125 ZZHC RX 250: Performed by: OBSTETRICS & GYNECOLOGY

## 2020-07-06 PROCEDURE — 59400 OBSTETRICAL CARE: CPT | Performed by: OBSTETRICS & GYNECOLOGY

## 2020-07-06 PROCEDURE — 12000001 ZZH R&B MED SURG/OB UMMC

## 2020-07-06 PROCEDURE — 40000977 ZZH STATISTIC ATTENDANCE AT DELIVERY

## 2020-07-06 PROCEDURE — 25000125 ZZHC RX 250: Performed by: STUDENT IN AN ORGANIZED HEALTH CARE EDUCATION/TRAINING PROGRAM

## 2020-07-06 PROCEDURE — 25000132 ZZH RX MED GY IP 250 OP 250 PS 637: Performed by: OBSTETRICS & GYNECOLOGY

## 2020-07-06 PROCEDURE — 25800030 ZZH RX IP 258 OP 636: Performed by: OBSTETRICS & GYNECOLOGY

## 2020-07-06 PROCEDURE — 25800030 ZZH RX IP 258 OP 636

## 2020-07-06 PROCEDURE — 25000132 ZZH RX MED GY IP 250 OP 250 PS 637

## 2020-07-06 PROCEDURE — 25000128 H RX IP 250 OP 636: Performed by: OBSTETRICS & GYNECOLOGY

## 2020-07-06 PROCEDURE — 00HU33Z INSERTION OF INFUSION DEVICE INTO SPINAL CANAL, PERCUTANEOUS APPROACH: ICD-10-PCS | Performed by: STUDENT IN AN ORGANIZED HEALTH CARE EDUCATION/TRAINING PROGRAM

## 2020-07-06 PROCEDURE — 0KQM0ZZ REPAIR PERINEUM MUSCLE, OPEN APPROACH: ICD-10-PCS | Performed by: OBSTETRICS & GYNECOLOGY

## 2020-07-06 RX ORDER — BISACODYL 10 MG
10 SUPPOSITORY, RECTAL RECTAL DAILY PRN
Status: DISCONTINUED | OUTPATIENT
Start: 2020-07-08 | End: 2020-07-08 | Stop reason: HOSPADM

## 2020-07-06 RX ORDER — LIDOCAINE HYDROCHLORIDE 10 MG/ML
INJECTION, SOLUTION EPIDURAL; INFILTRATION; INTRACAUDAL; PERINEURAL
Status: DISCONTINUED
Start: 2020-07-06 | End: 2020-07-06 | Stop reason: HOSPADM

## 2020-07-06 RX ORDER — MODIFIED LANOLIN
OINTMENT (GRAM) TOPICAL
Status: DISCONTINUED | OUTPATIENT
Start: 2020-07-06 | End: 2020-07-08 | Stop reason: HOSPADM

## 2020-07-06 RX ORDER — MISOPROSTOL 200 UG/1
TABLET ORAL
Status: COMPLETED
Start: 2020-07-06 | End: 2020-07-06

## 2020-07-06 RX ORDER — SODIUM CHLORIDE 9 MG/ML
INJECTION, SOLUTION INTRAVENOUS
Status: COMPLETED
Start: 2020-07-06 | End: 2020-07-06

## 2020-07-06 RX ORDER — OXYTOCIN/0.9 % SODIUM CHLORIDE 30/500 ML
340 PLASTIC BAG, INJECTION (ML) INTRAVENOUS CONTINUOUS PRN
Status: DISCONTINUED | OUTPATIENT
Start: 2020-07-06 | End: 2020-07-08 | Stop reason: HOSPADM

## 2020-07-06 RX ORDER — AMOXICILLIN 250 MG
1 CAPSULE ORAL 2 TIMES DAILY
Status: DISCONTINUED | OUTPATIENT
Start: 2020-07-06 | End: 2020-07-08 | Stop reason: HOSPADM

## 2020-07-06 RX ORDER — EPHEDRINE SULFATE 50 MG/ML
5 INJECTION, SOLUTION INTRAMUSCULAR; INTRAVENOUS; SUBCUTANEOUS
Status: DISCONTINUED | OUTPATIENT
Start: 2020-07-06 | End: 2020-07-06

## 2020-07-06 RX ORDER — NALOXONE HYDROCHLORIDE 0.4 MG/ML
.1-.4 INJECTION, SOLUTION INTRAMUSCULAR; INTRAVENOUS; SUBCUTANEOUS
Status: DISCONTINUED | OUTPATIENT
Start: 2020-07-06 | End: 2020-07-08 | Stop reason: HOSPADM

## 2020-07-06 RX ORDER — HYDROCORTISONE 2.5 %
CREAM (GRAM) TOPICAL 3 TIMES DAILY PRN
Status: DISCONTINUED | OUTPATIENT
Start: 2020-07-06 | End: 2020-07-08 | Stop reason: HOSPADM

## 2020-07-06 RX ORDER — NALOXONE HYDROCHLORIDE 0.4 MG/ML
.1-.4 INJECTION, SOLUTION INTRAMUSCULAR; INTRAVENOUS; SUBCUTANEOUS
Status: DISCONTINUED | OUTPATIENT
Start: 2020-07-06 | End: 2020-07-06

## 2020-07-06 RX ORDER — LIDOCAINE HYDROCHLORIDE AND EPINEPHRINE 15; 5 MG/ML; UG/ML
INJECTION, SOLUTION EPIDURAL PRN
Status: DISCONTINUED | OUTPATIENT
Start: 2020-07-06 | End: 2020-07-06 | Stop reason: HOSPADM

## 2020-07-06 RX ORDER — FENTANYL CITRATE 50 UG/ML
100 INJECTION, SOLUTION INTRAMUSCULAR; INTRAVENOUS ONCE
Status: COMPLETED | OUTPATIENT
Start: 2020-07-06 | End: 2020-07-06

## 2020-07-06 RX ORDER — METHYLERGONOVINE MALEATE 0.2 MG/ML
200 INJECTION INTRAVENOUS
Status: DISCONTINUED | OUTPATIENT
Start: 2020-07-06 | End: 2020-07-08 | Stop reason: HOSPADM

## 2020-07-06 RX ORDER — CARBOPROST TROMETHAMINE 250 UG/ML
250 INJECTION, SOLUTION INTRAMUSCULAR
Status: DISCONTINUED | OUTPATIENT
Start: 2020-07-06 | End: 2020-07-08 | Stop reason: HOSPADM

## 2020-07-06 RX ORDER — OXYTOCIN/0.9 % SODIUM CHLORIDE 30/500 ML
PLASTIC BAG, INJECTION (ML) INTRAVENOUS
Status: DISCONTINUED
Start: 2020-07-06 | End: 2020-07-06 | Stop reason: HOSPADM

## 2020-07-06 RX ORDER — OXYTOCIN 10 [USP'U]/ML
10 INJECTION, SOLUTION INTRAMUSCULAR; INTRAVENOUS
Status: DISCONTINUED | OUTPATIENT
Start: 2020-07-06 | End: 2020-07-08 | Stop reason: HOSPADM

## 2020-07-06 RX ORDER — SODIUM CHLORIDE 9 MG/ML
INJECTION, SOLUTION INTRAVENOUS CONTINUOUS
Status: DISCONTINUED | OUTPATIENT
Start: 2020-07-06 | End: 2020-07-06

## 2020-07-06 RX ORDER — ACETAMINOPHEN 325 MG/1
650 TABLET ORAL EVERY 4 HOURS PRN
Status: DISCONTINUED | OUTPATIENT
Start: 2020-07-06 | End: 2020-07-08 | Stop reason: HOSPADM

## 2020-07-06 RX ORDER — IBUPROFEN 600 MG/1
600 TABLET, FILM COATED ORAL EVERY 6 HOURS PRN
Qty: 60 TABLET | Refills: 0 | COMMUNITY
Start: 2020-07-06 | End: 2020-12-29

## 2020-07-06 RX ORDER — OXYTOCIN 10 [USP'U]/ML
INJECTION, SOLUTION INTRAMUSCULAR; INTRAVENOUS
Status: DISCONTINUED
Start: 2020-07-06 | End: 2020-07-06 | Stop reason: HOSPADM

## 2020-07-06 RX ORDER — AMOXICILLIN 250 MG
2 CAPSULE ORAL 2 TIMES DAILY
Status: DISCONTINUED | OUTPATIENT
Start: 2020-07-06 | End: 2020-07-08 | Stop reason: HOSPADM

## 2020-07-06 RX ORDER — OXYCODONE HYDROCHLORIDE 5 MG/1
5 TABLET ORAL ONCE
Status: COMPLETED | OUTPATIENT
Start: 2020-07-06 | End: 2020-07-06

## 2020-07-06 RX ORDER — IBUPROFEN 800 MG/1
800 TABLET, FILM COATED ORAL EVERY 6 HOURS PRN
Status: DISCONTINUED | OUTPATIENT
Start: 2020-07-06 | End: 2020-07-08 | Stop reason: HOSPADM

## 2020-07-06 RX ORDER — KETOROLAC TROMETHAMINE 30 MG/ML
30 INJECTION, SOLUTION INTRAMUSCULAR; INTRAVENOUS ONCE
Status: COMPLETED | OUTPATIENT
Start: 2020-07-06 | End: 2020-07-06

## 2020-07-06 RX ORDER — OXYTOCIN/0.9 % SODIUM CHLORIDE 30/500 ML
100 PLASTIC BAG, INJECTION (ML) INTRAVENOUS CONTINUOUS
Status: DISCONTINUED | OUTPATIENT
Start: 2020-07-06 | End: 2020-07-08 | Stop reason: HOSPADM

## 2020-07-06 RX ORDER — MISOPROSTOL 200 UG/1
TABLET ORAL
Status: DISCONTINUED
Start: 2020-07-06 | End: 2020-07-06 | Stop reason: WASHOUT

## 2020-07-06 RX ORDER — ACETAMINOPHEN 325 MG/1
650 TABLET ORAL EVERY 6 HOURS PRN
Qty: 100 TABLET | Refills: 0 | COMMUNITY
Start: 2020-07-06 | End: 2020-12-29

## 2020-07-06 RX ORDER — NALBUPHINE HYDROCHLORIDE 20 MG/ML
2.5-5 INJECTION, SOLUTION INTRAMUSCULAR; INTRAVENOUS; SUBCUTANEOUS EVERY 6 HOURS PRN
Status: DISCONTINUED | OUTPATIENT
Start: 2020-07-06 | End: 2020-07-06

## 2020-07-06 RX ORDER — AMOXICILLIN 250 MG
1 CAPSULE ORAL DAILY
Qty: 100 TABLET | Refills: 0 | COMMUNITY
Start: 2020-07-06 | End: 2020-07-23

## 2020-07-06 RX ORDER — MISOPROSTOL 200 UG/1
800 TABLET ORAL
Status: DISCONTINUED | OUTPATIENT
Start: 2020-07-06 | End: 2020-07-08 | Stop reason: HOSPADM

## 2020-07-06 RX ADMIN — ACETAMINOPHEN 650 MG: 325 TABLET, FILM COATED ORAL at 09:56

## 2020-07-06 RX ADMIN — KETOROLAC TROMETHAMINE 30 MG: 30 INJECTION, SOLUTION INTRAMUSCULAR at 18:03

## 2020-07-06 RX ADMIN — SODIUM CHLORIDE, POTASSIUM CHLORIDE, SODIUM LACTATE AND CALCIUM CHLORIDE 1000 ML: 600; 310; 30; 20 INJECTION, SOLUTION INTRAVENOUS at 02:30

## 2020-07-06 RX ADMIN — PENICILLIN G POTASSIUM 2.5 MILLION UNITS: 20000000 INJECTION, POWDER, FOR SOLUTION INTRAMUSCULAR; INTRAVENOUS at 06:21

## 2020-07-06 RX ADMIN — LIDOCAINE HYDROCHLORIDE,EPINEPHRINE BITARTRATE 2 ML: 15; .005 INJECTION, SOLUTION EPIDURAL; INFILTRATION; INTRACAUDAL; PERINEURAL at 03:26

## 2020-07-06 RX ADMIN — IBUPROFEN 800 MG: 800 TABLET, FILM COATED ORAL at 23:55

## 2020-07-06 RX ADMIN — Medication 340 ML/HR: at 06:41

## 2020-07-06 RX ADMIN — FENTANYL CITRATE 100 MCG: 50 INJECTION INTRAMUSCULAR; INTRAVENOUS at 02:35

## 2020-07-06 RX ADMIN — SODIUM CHLORIDE: 9 INJECTION, SOLUTION INTRAVENOUS at 06:10

## 2020-07-06 RX ADMIN — DOCUSATE SODIUM 50 MG AND SENNOSIDES 8.6 MG 1 TABLET: 8.6; 5 TABLET, FILM COATED ORAL at 19:42

## 2020-07-06 RX ADMIN — ACETAMINOPHEN 650 MG: 325 TABLET, FILM COATED ORAL at 19:42

## 2020-07-06 RX ADMIN — SODIUM CHLORIDE 250 ML: 9 INJECTION, SOLUTION INTRAVENOUS at 05:50

## 2020-07-06 RX ADMIN — LIDOCAINE HYDROCHLORIDE,EPINEPHRINE BITARTRATE 3 ML: 15; .005 INJECTION, SOLUTION EPIDURAL; INFILTRATION; INTRACAUDAL; PERINEURAL at 03:21

## 2020-07-06 RX ADMIN — PENICILLIN G POTASSIUM 2.5 MILLION UNITS: 20000000 INJECTION, POWDER, FOR SOLUTION INTRAMUSCULAR; INTRAVENOUS at 01:55

## 2020-07-06 RX ADMIN — MISOPROSTOL 800 MCG: 200 TABLET ORAL at 06:47

## 2020-07-06 RX ADMIN — OXYCODONE HYDROCHLORIDE 5 MG: 5 TABLET ORAL at 20:10

## 2020-07-06 RX ADMIN — Medication 10 ML/HR: at 03:30

## 2020-07-06 RX ADMIN — Medication 5 ML: at 03:29

## 2020-07-06 RX ADMIN — SODIUM CHLORIDE, POTASSIUM CHLORIDE, SODIUM LACTATE AND CALCIUM CHLORIDE: 600; 310; 30; 20 INJECTION, SOLUTION INTRAVENOUS at 03:00

## 2020-07-06 RX ADMIN — IBUPROFEN 800 MG: 800 TABLET, FILM COATED ORAL at 14:26

## 2020-07-06 RX ADMIN — ACETAMINOPHEN 650 MG: 325 TABLET, FILM COATED ORAL at 14:25

## 2020-07-06 RX ADMIN — IBUPROFEN 800 MG: 800 TABLET, FILM COATED ORAL at 08:00

## 2020-07-06 RX ADMIN — SODIUM CHLORIDE, POTASSIUM CHLORIDE, SODIUM LACTATE AND CALCIUM CHLORIDE 500 ML: 600; 310; 30; 20 INJECTION, SOLUTION INTRAVENOUS at 04:46

## 2020-07-06 RX ADMIN — Medication: at 03:27

## 2020-07-06 RX ADMIN — LIDOCAINE HYDROCHLORIDE 10 ML: 10 INJECTION, SOLUTION EPIDURAL; INFILTRATION; INTRACAUDAL; PERINEURAL at 06:49

## 2020-07-06 RX ADMIN — ACETAMINOPHEN 650 MG: 325 TABLET, FILM COATED ORAL at 23:55

## 2020-07-06 NOTE — PLAN OF CARE
Data: Rebecca Fischer transferred to 7124 via wheelchair at 0930. Baby transferred via parent's arms.  Action: Receiving unit notified of transfer: Yes. Patient and family notified of room change. Report given to Reina TRAYLOR RN at 0858. Belongings sent to receiving unit. Accompanied by Registered Nurse. Oriented patient to surroundings. Call light within reach. ID bands double-checked with receiving RN.  Response: Patient tolerated transfer and is stable.

## 2020-07-06 NOTE — PROGRESS NOTES
[facility]  Labor Progress Note    Subjective:  Patient doing well. Not feeling contractions now w epidural - she states she is very comfortable.     Objective:   Patient Vitals for the past 4 hrs:   BP Temp Temp src Resp SpO2   20 0430 100/52 98.3  F (36.8  C) Oral 18 99 %   20 0355 112/64 -- -- 18 100 %   20 0348 107/66 -- -- 18 100 %   20 0345 110/68 -- -- 18 100 %   20 0339 114/63 98.2  F (36.8  C) Oral 18 100 %   20 0333 117/73 -- -- 18 100 %   20 0330 117/74 -- -- 18 100 %   20 0326 119/73 -- -- 18 100 %   20 0324 115/76 -- -- 18 100 %   20 0322 112/73 -- -- 18 100 %   20 0320 110/75 -- -- 18 100 %     SVE: 8.5/90/-1 per bedside RN at 0423 and again my exam at 5:29 AM     FHT: Baseline 140, moderate variability, present accelerations, occasional early and late decelerations  [place]: 3-5 contractions in 10 minutes    Assessment/Plan:  Ms. Rebecca Fischer is a 34 year old , at 39w3d by LMP c/w 9w1d US, who presents for elective IOL. Pregnancy complicated by GBS positive status.     #IOL  - S/p PV miso x2; s/p SROM 0405 clear fluid; laboring on her own, add pitocin PRN   - GBS positive, PCN running    - Pain: epidural w good pain control   - Anticipate      #FWB  - Cat II FHT reassured by moderate variability now and reactivity to scalp stim; she has gotten a 500cc bolus and repositioning  - Continuous monitoring    MARY Cullen MD  Obstetrics and Gyncology, PGY-2  Ob-Gyn PGY-2 Pager: [phone]   20 5:31 AM

## 2020-07-06 NOTE — L&D DELIVERY NOTE
OB Vaginal Delivery Note    Rebecca Fischer MRN# [id number]   Age: 34 year old YOB: 1985       GA: 39w4d  GP:   Labor Complications: None   EBL:   mL  Delivery QBL: 235 mL  Delivery Type: Vaginal, Spontaneous   ROM to Delivery Time: (Delivered) Hours: 2 Minutes: 36  [place] Weight:     1 Minute 5 Minute 10 Minute   Apgar Totals:            CLAUDIA SILVA;LARA HELMS;FUENTES BRADLEY;TARA WOOD     Delivery Details:  Rebecca Fischer, a 34 year old  female delivered a viable infant with at 0640 over a small second degree laceration.    Delivery was via vaginal, spontaneous  to a sterile field under epidural  anesthesia. Infant delivered in vertex  right  occiput  anterior  position. Anterior and posterior shoulders delivered without difficulty.  There was a tight nuchal cord noted x 3 which could not be reduced after delivery of the fetal head.  There was bradycardia noted for just a couple minutes in the second stage as she pushed the baby down to a full crown.  The second stage was only 8 minutes and the fetal heart tones were not down the entire time.  Delyed cord clamping was done and baby cried as soon as the cord was unwrapped.   The cord was clamped, cut twice and 3 vessels  were noted. Cord blood was obtained in routine fashion with the following disposition: lab NICU was present but left soon after birth as baby was fine.       Cord complications: nuchal   Placenta delivered at 2020  6:45 AM . Placental disposition was  . Fundal massage performed and fundus found to be firm.     Episiotomy: none    Perineum, vagina, cervix were inspected, and the following lacerations were noted:   Perineal lacerations: 2nd                Any lacerations were repaired in the usual fashion using 3.0 vicryl.    Excellent hemostasis was noted. Needle count correct. Infant and patient in delivery room in good and stable condition.     Mom and baby stable following birth.      Bruna Ozuna MD        Labor Event Times    Labor onset date:  20 Onset time:   1:30 AM   Dilation complete date:  20 Complete time:   6:32 AM   Start pushing date/time:  2020 0633      Labor Events     labor?:  No   steroids:  None  Labor Type:  Induction/Cervical ripening  Predominate monitoring during 1st stage:  continuous electronic fetal monitoring     Antibiotics received during labor?:  Yes  Reason for Antibiotics:  GBS  Antibiotics received for GBS:  Penicillin  Antibiotics Given (GBS):  Greater than 4 hours prior to delivery     Rupture date/time: 20 0404   Rupture type:  Spontaneous rupture of membranes occuring during spontaneous labor or augmentation  Fluid color:  Clear  Fluid odor:  Normal     Induction:  Misoprostol, Oxytocin  Induction date/time: 20 1100   Cervical ripening date/time:     Indications for induction:  Elective     1:1 continuous labor support provided by?:  RN Labor partogram used?:  no      Delivery/Placenta Date and Time    Delivery Date:  20 Delivery Time:   6:40 AM   Placenta Date/Time:  2020  6:45 AM  Oxytocin given at the time of delivery:  after delivery of baby     Vaginal Counts     Initial count performed by 2 team members:   Two Team Members   WINSOME Weber MD       Needles Suture [place] Sponges Instruments   Initial counts 2  5    Added to count  1     Final counts 2 1 5    Placed during labor Accounted for at the end of labor    Final count performed by 2 team members:   Two Team Members   WINSOME Weber MD      Final count correct?:  Yes     Cord    Vessels:  3 Vessels Complications:  Nuchal   Cord Blood Disposition:  Lab Gases Sent?:  Yes      [place] Resuscitation    [place] Care at Delivery:  Data: Male infant born at 0640. NICU present for delivery due to category two tracing.  Action: No interventions needed. Spontaneous cry, stimulated, placed on mothers abdomen. Delayed cord clamping. Cord  cut. Placed on mothers chest, warm blankets. applied, hat applied.  Response: Stable [place]. Positive bonding behaviors observed.  -Pineda NEGRETE, RN      Labor Events and Shoulder Dystocia    Fetal Tracing Prior to Delivery:  Category 2  Fetal Tracing Comments:  deep decelerations noted in the second stage of labor, triple nuchal cord x 3 tight noted.  unable to reduce following delivery of the head   Shoulder dystocia present?:  Neg     Delivery (Maternal) (Provider to Complete) ([id number])    Episiotomy:  None  Perineal lacerations:  2nd       Blood Loss  Mother: Rebecca Fischer #[id number]   Start of Mother's Information    IO Blood Loss  20 0130 - 20 0834    Delivery QBL (mL) Hospital Encounter 235 mL    Total  235 mL         End of Mother's Information  Mother: Rebecca Fischer #[id number]         Delivery - Provider to Complete ([id number])    Delivering clinician:  Bruna Ozuna MD  Attempted Delivery Types (Choose all that apply):  Spontaneous Vaginal Delivery  Delivery Type (Choose the 1 that will go to the Birth History):  Vaginal, Spontaneous   Other personnel:   Provider Role   Bruna Ozuna MD Obstetrician   Stefanie Hernandez MD Resident   Angy Lucas RN Registered Nurse   Pineda Martin RN Registered Nurse         Placenta    Date/Time:  2020  6:45 AM     Anesthesia    Method:  Epidural  Cervical dilation at placement:  4-7          Presentation and Position    Presentation:  Vertex  Position:  Right Occiput Anterior           Bruna Ozuna MD

## 2020-07-06 NOTE — PROGRESS NOTES
[facility]  Labor Progress Note    Subjective:  Patient doing well. Feeling some contractions, would like vistaril/morphine for sleep.     Objective:   Patient Vitals for the past 4 hrs:   BP Temp Temp src Resp   20 -- 97.9  F (36.6  C) Oral --   20 111/61 98.2  F (36.8  C) Oral 18     SVE: 2.5/30/-3 (at 1730) > 3/60/-3 by staff MD    FHT: Baseline 150, moderate variability, present accelerations, absent decelerations  [place]: 2-3 contractions in 10 minutes    Assessment/Plan:  Ms. Rebecca Fischer is a 34 year old , at 39w3d by LMP c/w 9w1d US, who presents for elective IOL. Pregnancy complicated by GBS positive status.     #IOL  - S/p PV miso x2 > has made cervical change since    - GBS positive, PCN running as pt has history of fast deliveries   - Pain: per pt request  - Anticipate      #FWB  - Cat I FHT  - Continuous monitoring    MARY Cullen MD  Obstetrics and Gyncology, PGY-2  Ob-Gyn PGY-2 Pager: [phone]   20     Patient is feeling more uncomfortable but ctx's are tolerable.  On exam her cervix has made small changes.  Will continue with another dose of miso given that she is responding to it and would prefer not to be hooked up to the IV just yet.    She is hoping to have an epidural prior to the ctx's getting too intense since she has a h/o fast labors.   MDA was given a heads up at safety rounds.   3rd dose of miso placed at  by me.  Patient tolerated it well.   Bruna Ozuna MD  2201

## 2020-07-06 NOTE — PLAN OF CARE
of viable Male with Dr. Ozuna and Dr. Hernandez in attendance. NICU Nursery RN Karyle present. Infant with spontaneous cry to mothers abdomen, dried and stimulated. Apgars 8/9. Placenta delivered without complications, pitocin bolused, 2nd degree laceration, repairs done. Janet cares provided. Mother and baby in stable condition.

## 2020-07-06 NOTE — DISCHARGE SUMMARY
[facility] Discharge Summary    Rebecca Fischer MRN# [id number]   Age: 34 year old YOB: 1985     Date of Admission:  2020  Date of Discharge::  2020  Admitting Physician:  Edna Valdez MD  Discharge Physician:  Yanni Rios MD           Admission Diagnoses:   -IUP at 39w4d  -GBS positive status  -Depression          Discharge Diagnosis:   -IUP at 39w4d, now delivered          Procedures:   Procedure(s): -  -Epidural          Medications Prior to Admission:     Medications Prior to Admission   Medication Sig Dispense Refill Last Dose     Acetaminophen (TYLENOL PO)    Past Month at Unknown time     ferrous sulfate (FEROSUL) 325 (65 Fe) MG tablet Take 325 mg by mouth daily (with breakfast)   2020 at 0800     Prenatal Vit-Fe Fumarate-FA (PRENATAL VITAMIN AND MINERAL PO)    2020 at 2200     ECHINACEA EXTRACT PO    More than a month at Unknown time     Multiple Vitamins-Minerals (EMERGEN-C IMMUNE PO)    More than a month at Unknown time     sertraline (ZOLOFT) 50 MG tablet Take 1 tablet (50 mg) by mouth daily (Patient not taking: Reported on 2020) 100 tablet 1      UNABLE TO FIND MEDICATION NAME: vitamin d3 magnesium        vitamin B6 (PYRIDOXINE) 25 MG tablet Take 25 mg by mouth daily        [DISCONTINUED] hydrOXYzine (ATARAX) 25 MG tablet Take 2 tablets (50 mg) by mouth 3 times daily as needed for anxiety 60 tablet 1 Past Week at Unknown time             Discharge Medications:        Review of your medicines      START taking      Dose / Directions   ibuprofen 600 MG tablet  Commonly known as:  ADVIL/MOTRIN      Dose:  600 mg  Take 1 tablet (600 mg) by mouth every 6 hours as needed for moderate pain Start after delivery  Quantity:  60 tablet  Refills:  0     senna-docusate 8.6-50 MG tablet  Commonly known as:  SENOKOT-S/PERICOLACE      Dose:  1 tablet  Take 1 tablet by mouth daily Start after delivery.  Quantity:  100 tablet  Refills:  0        CONTINUE these  medicines which may have CHANGED, or have new prescriptions. If we are uncertain of the size of tablets/capsules you have at home, strength may be listed as something that might have changed.      Dose / Directions   * TYLENOL PO  This may have changed:  Another medication with the same name was added. Make sure you understand how and when to take each.      Refills:  0     * acetaminophen 325 MG tablet  Commonly known as:  TYLENOL  This may have changed:  You were already taking a medication with the same name, and this prescription was added. Make sure you understand how and when to take each.      Dose:  650 mg  Take 2 tablets (650 mg) by mouth every 6 hours as needed for mild pain Start after Delivery.  Quantity:  100 tablet  Refills:  0         * This list has 2 medication(s) that are the same as other medications prescribed for you. Read the directions carefully, and ask your doctor or other care provider to review them with you.            CONTINUE these medicines which have NOT CHANGED      Dose / Directions   ECHINACEA EXTRACT PO      Refills:  0     EMERGEN-C IMMUNE PO      Refills:  0     ferrous sulfate 325 (65 Fe) MG tablet  Commonly known as:  FEROSUL      Dose:  325 mg  Take 325 mg by mouth daily (with breakfast)  Refills:  0     PRENATAL VITAMIN AND MINERAL PO      Refills:  0     pyridOXINE 25 MG tablet  Commonly known as:  VITAMIN B6      Dose:  25 mg  Take 25 mg by mouth daily  Refills:  0     sertraline 50 MG tablet  Commonly known as:  ZOLOFT  Used for:  Anxiety      Dose:  50 mg  Take 1 tablet (50 mg) by mouth daily  Quantity:  100 tablet  Refills:  1     UNABLE TO FIND      MEDICATION NAME: vitamin d3 magnesium  Refills:  0        STOP taking    hydrOXYzine 25 MG tablet  Commonly known as:  ATARAX              Where to get your medicines      Some of these will need a paper prescription and others can be bought over the counter. Ask your nurse if you have questions.    You don't need a  prescription for these medications    acetaminophen 325 MG tablet    ibuprofen 600 MG tablet    senna-docusate 8.6-50 MG tablet             Consultations:   Anesthesia          Brief Admission History   Ms. Rebecca Fischer is a 34 year old  at 39w3d by LMP c/w 9w1d US, who presents for elective IOl. She reports occasional contractions. Denies vaginal bleeding and loss of fluid. Active fetal movement. No additional concerns.         Brief Intrapartum Course:   Rebecca Fischer, a 34 year old  female delivered a viable infant with at 0640 over a small second degree laceration.    Delivery was via vaginal, spontaneous  to a sterile field under epidural  anesthesia. Infant delivered in vertex  right  occiput  anterior  position. Anterior and posterior shoulders delivered without difficulty.  There was a tight nuchal cord noted x 3 which could not be reduced after delivery of the fetal head.  There was bradycardia noted for just a couple minutes in the second stage as she pushed the baby down to a full crown.  The second stage was only 8 minutes and the fetal heart tones were not down the entire time.  Delyed cord clamping was done and baby cried as soon as the cord was unwrapped.   The cord was clamped, cut twice and 3 vessels  were noted. Cord blood was obtained in routine fashion with the following disposition: lab NICU was present but left soon after birth as baby was fine.              Hospital Course:   The patient's hospital course was unremarkable.  On discharge, her pain was well controlled. Vaginal bleeding is similar to peak menstrual flow.  Voiding without difficulty.  Ambulating well and tolerating a normal diet.  No fever.  Breastfeeding well.  Infant is stable.  No bowel movement yet.*  She was discharged on post-partum day #2.    Post-partum hemoglobin: 10.3          Discharge Instructions and Follow-Up:   Discharge diet: Regular   Discharge activity: Pelvic rest for 6 weeks including no  sexual intercourse, tampons, or douching.    Discharge follow-up: Follow up with your primary OB for a routine postpartum visit in 6 weeks           Discharge Disposition:   Discharged to home in stable condition      MARY Cullen MD  Obstetrics and Gyncology, PGY-2  Ob-Gyn PGY-2 Pager: [phone]

## 2020-07-06 NOTE — PLAN OF CARE
VSS. Denies LOF/bleeding. 3rd dose of misoprostol administered at 2130. Ctx q2-7minutes. FHR baseline increased to 160, LR bolus administered per orders and then tachycardia resolved. Pt ambulating in room and using birthing ball throughout evening. Morphine and vistaril given per orders, pt now resting in bed. Will continue to monitor.

## 2020-07-06 NOTE — PROGRESS NOTES
[facility]  Labor Progress Note    Subjective:  Patient doing well. Feeling contractions, able to sleep a few hours, now megha more uncomfortably would like fentanyl now and epidural soon.     Objective:   Patient Vitals for the past 4 hrs:   BP Temp Temp src Resp   20 2344 109/61 98.6  F (37  C) Oral 18     SVE: 2.5/30/-3 (at 1730) > 3/60/-3 by staff MD > 7/60/-1    FHT: Baseline 120, moderate variability, present accelerations, absent decelerations  [place]: 3-5 contractions in 10 minutes    Assessment/Plan:  Ms. Rebecca Fischer is a 34 year old , at 39w3d by LMP c/w 9w1d US, who presents for elective IOL. Pregnancy complicated by GBS positive status.     #IOL  - S/p PV miso x2 - now laboring on her own, add pitocin PRN; AROM after gets epidural   - GBS positive, PCN running as pt has history of fast deliveries   - Pain: per pt request  - Anticipate      #FWB  - Cat I FHT  - Continuous monitoring    MARY Cullen MD  Obstetrics and Gyncology, PGY-2  Ob-Gyn PGY-2 Pager: [phone]   20 2:32 AM

## 2020-07-06 NOTE — PROGRESS NOTES
Asked by Dr. Ozuna to attend the delivery of this term, male infant with a gestational age of 39 4/7 weeks secondary to decels and nuchal cord.  Infant already born and on mother's chest when team arrived.  Baby cried spontaneously and had good tone.  Left baby on mom's chest.  Had good air entry, heart rate and tone.  Encouraged team to call with more concerns.    IGOR Woodson, NNP-BC 7/6/2020 6:49 AM  [facility]

## 2020-07-06 NOTE — PROVIDER NOTIFICATION
07/06/20 0603   Provider Notification   Provider Name/Title Dr. Ozuna   Method of Notification At Bedside   Request Evaluate in Person   Notification Reason Status Update     Dr. Ozuna at bedside to discuss progress. Will start Pitocin for augmentation.

## 2020-07-06 NOTE — PROVIDER NOTIFICATION
07/06/20 0527   Provider Notification   Provider Name/Title Dr. Beach   Method of Notification At Bedside   Request Evaluate in Person   Notification Reason SVE;Status Update;Decels     Paged Dr. Beach regarding LDs, and she came to bedside to evaluate. SVE of 8/90%/0. Will continue to monitor.

## 2020-07-06 NOTE — PLAN OF CARE
Vss, postpartum assessment WDL. Patient is taking ibuprofen and tylenol for pain control. Spoke with Dr Gonzalez about getting some oxy for patient since multip and experiencing more severe cramping. Toradol was ordered for pain 5/10 or higher and patient is using hot packs to abdomen. Breast feeding infant independently, latch on observed. Voiding and emptying since delivery. Using ice and tucks to perineum. Encouraged to take a tub soak. Continue with plan of care.

## 2020-07-06 NOTE — ANESTHESIA PREPROCEDURE EVALUATION
"Anesthesia Pre-Procedure Evaluation    Patient: Rebecca Fischer   MRN:     [id number] Gender:   female   Age:    34 year old :      1985        Preoperative Diagnosis: * No surgery found *        LABS:  CBC:   Lab Results   Component Value Date    WBC 11.0 2020    WBC 9.8 2019    HGB 11.6 (L) 2020    HGB 11.7 2020    HCT 35.4 2020    HCT 31.9 (L) 2019     2020     2019     BMP: No results found for: NA, POTASSIUM, CHLORIDE, CO2, BUN, CR, GLC  COAGS: No results found for: PTT, INR, FIBR  POC:   Lab Results   Component Value Date    HCG Positive (A) 2019     OTHER: No results found for: PH, LACT, A1C, ANDREA, PHOS, MAG, ALBUMIN, PROTTOTAL, ALT, AST, GGT, ALKPHOS, BILITOTAL, BILIDIRECT, LIPASE, AMYLASE, MORRO, TSH, T4, T3, CRP, SED     Preop Vitals    BP Readings from Last 3 Encounters:   20 109/61   20 115/76   20 134/79    Pulse Readings from Last 3 Encounters:   20 84   20 84   20 91      Resp Readings from Last 3 Encounters:   20 18   19    SpO2 Readings from Last 3 Encounters:   20 100%   20 99%   20 100%      Temp Readings from Last 1 Encounters:   20 37  C (98.6  F) (Oral)    Ht Readings from Last 1 Encounters:   20 1.626 m (5' 4\")      Wt Readings from Last 1 Encounters:   20 87.2 kg (192 lb 3.2 oz)    Estimated body mass index is 32.99 kg/m  as calculated from the following:    Height as of 20: 1.626 m (5' 4\").    Weight as of 20: 87.2 kg (192 lb 3.2 oz).     LDA:  Peripheral IV 20 Right Upper forearm (Active)   Site Assessment WDL 20 2344   Line Status Saline locked 20 2344   Phlebitis Scale 0-->no symptoms 20 2344   Infiltration Scale 0 20 2344   Number of days: 1        Past Medical History:   Diagnosis Date     Anemia     on supplemental iron     NO ACTIVE PROBLEMS       Past Surgical History:   Procedure " Laterality Date     C BREAST AUGMENTATION        No Known Allergies          UZAIR FV AN PHYSICAL EXAM    Assessment:   ASA SCORE: 2 emergent   H&P: History and physical reviewed and following examination; no interval change.    NPO Status: NPO Appropriate     Plan:   Anes. Type:  Epidural     Epidural Details:  Catheter; Lumbar   Pre-Medication: None   Induction:  N/a   Airway: Native Airway   Access/Monitoring: PIV   Maintenance: N/a     Postop Plan:   Postop Pain: Regional  Postop Sedation/Airway: Not planned  Disposition: Inpatient/Admit     PONV Management: Adult Risk Factors: Female     CONSENT: Direct conversation   Plan and risks discussed with: Patient                      Jong Bird MD     ANESTHESIA PREOP EVALUATION    PROCEDURE:     HPI: Rebecca Fischer is a 34 year old female who presents for above procedure 2/2 labor pain. Reports history of one sided epidural and bradycardia and hypotension, and high epidural after it was administered a bolus.    PAST MEDICAL HISTORY:    Past Medical History:   Diagnosis Date     Anemia     on supplemental iron     NO ACTIVE PROBLEMS        PAST SURGICAL HISTORY:    Past Surgical History:   Procedure Laterality Date     C BREAST AUGMENTATION         SOCIAL HISTORY:       Social History     Tobacco Use     Smoking status: Former Smoker     Last attempt to quit: 2006     Years since quittin.5     Smokeless tobacco: Never Used   Substance Use Topics     Alcohol use: Not Currently     Comment: occasionally, none since pregnancy known       ALLERGIES:     No Known Allergies    MEDICATIONS:     Medications Prior to Admission   Medication Sig Dispense Refill Last Dose     Acetaminophen (TYLENOL PO)    Past Month at Unknown time     ferrous sulfate (FEROSUL) 325 (65 Fe) MG tablet Take 325 mg by mouth daily (with breakfast)   2020 at 0800     hydrOXYzine (ATARAX) 25 MG tablet Take 2 tablets (50 mg) by mouth 3 times daily as needed for anxiety 60 tablet 1  Past Week at Unknown time     Prenatal Vit-Fe Fumarate-FA (PRENATAL VITAMIN AND MINERAL PO)    7/4/2020 at 2200     ECHINACEA EXTRACT PO    More than a month at Unknown time     Multiple Vitamins-Minerals (EMERGEN-C IMMUNE PO)    More than a month at Unknown time     sertraline (ZOLOFT) 50 MG tablet Take 1 tablet (50 mg) by mouth daily (Patient not taking: Reported on 6/5/2020) 100 tablet 1      UNABLE TO FIND MEDICATION NAME: vitamin d3 magnesium        vitamin B6 (PYRIDOXINE) 25 MG tablet Take 25 mg by mouth daily          No current outpatient medications on file.       Current Facility-Administered Medications Ordered in Epic   Medication Dose Route Frequency Provider Last Rate Last Dose     acetaminophen (TYLENOL) tablet 650 mg  650 mg Oral Q4H PRN Edna Valdez MD         carboprost (HEMABATE) injection 250 mcg  250 mcg Intramuscular Once PRN Edna Valdez MD         ePHEDrine injection 5 mg  5 mg Intravenous Q3 Min PRN Jong Bird MD         fentaNYL (SUBLIMAZE) 2 mcg/mL, bupivacaine (MARCAINE) 0.125% in NS premix for PCEA   EPIDURAL PCEA Jong Bird MD         hydrOXYzine (ATARAX) tablet 100 mg  100 mg Oral Q6H PRN Yudy Beach MD   100 mg at 07/05/20 2206    Or     hydrOXYzine (ATARAX) tablet 50 mg  50 mg Oral Q6H PRN Yudy Beach MD         hydrOXYzine (VISTARIL) injection 100 mg  100 mg Intramuscular Once Yudy Beach MD   Stopped at 07/05/20 2228     ibuprofen (ADVIL/MOTRIN) tablet 800 mg  800 mg Oral Once PRN Edna Valdez MD         IF subcutaneous (SQ) Unfractionated heparin (UFH) ordered for thromboprophylaxis   Does not apply See Admin Instructions Jong Bird MD        Or     IF intravenous (IV) Unfractionated heparin (UFH) ordered   Does not apply See Admin Instructions Jong Bird MD        Or     IF LOW-dose Low molecular weight heparin (LMWH) thromboprophylaxis ordered   Does not apply See Admin Instructions Jong Bird MD        Or     IF HIGHER-dose Low molecular  "weight heparin (LMWH) thromboprophylaxis ordered   Does not apply See Admin Instructions Jong Bird MD         lactated ringers BOLUS 250 mL  250 mL Intravenous Once PRN Jong Bird MD         lactated ringers infusion   Intravenous Continuous Edna Valdez MD   Stopped at 07/05/20 1458     lidocaine (PF) (XYLOCAINE) 1 % injection              lidocaine 1 % 0.1-20 mL  0.1-20 mL Subcutaneous Once PRN Edna Valdez MD         medication instruction   Does not apply Continuous PRN Jong Bird MD         Medication Instructions - cervical ripening and induction medications   Does not apply Continuous PRN Bruna Ozuna MD         Medication Instructions - cervical ripening and induction medications   Does not apply Continuous PRN Stefanie Hernandez MD         Medication Instructions: misoprostol (CYTOTEC)- Nurse to discuss ordering with provider, if needed. Ordered via \"OB misoprostol (CYTOTEC) Postpartum Hemorrhage PANEL\"   Does not apply Continuous PRN Edna Valdez MD         methylergonovine (METHERGINE) injection 200 mcg  200 mcg Intramuscular Once PRN Edna Valdez MD         misoprostol (cervical ripening) (CYTOTEC) quarter-tab 25 mcg  25 mcg Vaginal Q4H PRN Bruna Ozuna MD   Stopped at 07/05/20 1734     misoprostol (cervical ripening) (CYTOTEC) quarter-tab 25 mcg  25 mcg Vaginal Q4H PRN Stefanie Hernandez MD   25 mcg at 07/05/20 2125     misoprostol (CYTOTEC) 200 MCG tablet              nalbuphine (NUBAIN) injection 2.5-5 mg  2.5-5 mg Intravenous Q6H PRN Jong Bird MD         naloxone (NARCAN) injection 0.1-0.4 mg  0.1-0.4 mg Intravenous Q2 Min PRN Jong Bird MD         naloxone (NARCAN) injection 0.1-0.4 mg  0.1-0.4 mg Intravenous Q2 Min PRN Edna Valdez MD         nitrous oxide/oxygen 50/50 blend   Inhalation Continuous PRN Edna Valdez MD         ondansetron (ZOFRAN) injection 4 mg  4 mg Intravenous Q6H PRN Edna Valdez MD         Opioid plan postpartum - medication " instruction   Does not apply Continuous PRN Jong Bird MD         oxyCODONE-acetaminophen (PERCOCET) 5-325 MG per tablet 1 tablet  1 tablet Oral Once PRN Edna Valdez MD         oxytocin (PITOCIN) 10 UNIT/ML injection              oxytocin (PITOCIN) 30 units in 500 mL 0.9% NaCl infusion  100-340 mL/hr Intravenous Continuous PRN Edna Valdez MD         oxytocin (PITOCIN) injection 10 Units  10 Units Intramuscular Once PRN Edna Valdez MD         oxytocin in 0.9% NaCl (PITOCIN) 30 units/500 mL infusion              penicillin G potassium injection 2.5 Million Units  2.5 Million Units Intravenous Q4H Edna Valdez MD   2.5 Million Units at 07/06/20 0155     sertraline (ZOLOFT) tablet 50 mg  50 mg Oral Daily Bruna Ozuna MD   Stopped at 07/05/20 1342     tranexamic acid (CYKLOKAPRON) bolus 1 g vial attach to NaCl 50 or 100 mL bag ADULT  1 g Intravenous Q30 Min PRN Edna Valdez MD         No current [facility]-ordered outpatient medications on file.       PHYSICAL EXAM:    Vitals: T 98.6, P 84, /61, R 18, SpO2  , Weight   Wt Readings from Last 2 Encounters:   06/30/20 87.2 kg (192 lb 3.2 oz)   06/25/20 86.6 kg (191 lb)       See doc flowsheet    NPO STATUS: see doc flowsheet    LABS:    BMP:  No results for input(s): NA, POTASSIUM, CHLORIDE, CO2, BUN, CR, GLC, ANDREA in the last 31561 hours.    LFTs:   No results for input(s): PROTTOTAL, ALBUMIN, BILITOTAL, ALKPHOS, AST, ALT, BILIDIRECT in the last 60071 hours.    CBC:   Recent Labs   Lab Test 07/05/20  1330   WBC 11.0   RBC 3.90   HGB 11.6*   HCT 35.4   MCV 91   MCH 29.7   MCHC 32.8   RDW 13.0          Coags:  No results for input(s): INR, PTT, FIBR in the last 44494 hours.    Imaging:  No orders to display       Jong Bird MD  Anesthesiology Staff  Pager [phone]    7/6/2020  2:52 AM

## 2020-07-06 NOTE — ANESTHESIA PROCEDURE NOTES
Epidural Procedure Note  Staff -   Anesthesiologist:  Jong Bird MD      Performed By: anesthesiologist          Location: OB     Procedure start time:  7/6/2020 3:10 AM     Procedure end time:  7/6/2020 3:30 AM   Pre-procedure checklist:   patient identified, IV checked, site marked, risks and benefits discussed, informed consent, monitors and equipment checked, pre-op evaluation and at physician/surgeon's request      Correct Patient: Yes      Correct Position: Yes      Correct Site: Yes      Correct Procedure: Yes      Correct Laterality:  Yes    Site Marked:  Yes  Procedure:     Procedure:  Epidural catheter    ASA:  2 and Emergent    Diagnosis:  Labor pain    Position:  Sitting    Sterile Prep: chloraprep, mask, sterile gloves and patient draped      Insertion site:  L2-3    Local skin infiltration:  1% lidocaine    amount (mL):  3    Approach:  Midline    Needle gauge (G):  17    Needle Length (in):  3.5    Block Needle Type:  Touhy    Injection Technique:  LORT saline    SADAF at (cm):  4.5    Attempts:  1    Redirects:  0    Catheter gauge (G):  19    Catheter threaded easily: Yes      Threaded to cm at skin:  9    Threaded in epidural space (cm):  4.5    Paresthesias:  No    Aspiration negative for Heme or CSF: Yes      Test dose (mL):  3     Local anesthetic:  Lidocaine 1.5% w/ 1:200,000 epinephrine    Test dose time:  03:21    Test dose negative for signs of intravascular, subdural or intrathecal injection: Yes

## 2020-07-07 LAB — HGB BLD-MCNC: 10.3 G/DL (ref 11.7–15.7)

## 2020-07-07 PROCEDURE — 36415 COLL VENOUS BLD VENIPUNCTURE: CPT | Performed by: STUDENT IN AN ORGANIZED HEALTH CARE EDUCATION/TRAINING PROGRAM

## 2020-07-07 PROCEDURE — 25000132 ZZH RX MED GY IP 250 OP 250 PS 637: Performed by: STUDENT IN AN ORGANIZED HEALTH CARE EDUCATION/TRAINING PROGRAM

## 2020-07-07 PROCEDURE — 85018 HEMOGLOBIN: CPT | Performed by: STUDENT IN AN ORGANIZED HEALTH CARE EDUCATION/TRAINING PROGRAM

## 2020-07-07 PROCEDURE — 12000001 ZZH R&B MED SURG/OB UMMC

## 2020-07-07 RX ADMIN — IBUPROFEN 800 MG: 800 TABLET, FILM COATED ORAL at 23:53

## 2020-07-07 RX ADMIN — ACETAMINOPHEN 650 MG: 325 TABLET, FILM COATED ORAL at 07:50

## 2020-07-07 RX ADMIN — ACETAMINOPHEN 650 MG: 325 TABLET, FILM COATED ORAL at 22:07

## 2020-07-07 RX ADMIN — ACETAMINOPHEN 650 MG: 325 TABLET, FILM COATED ORAL at 18:02

## 2020-07-07 RX ADMIN — IBUPROFEN 800 MG: 800 TABLET, FILM COATED ORAL at 05:49

## 2020-07-07 RX ADMIN — DOCUSATE SODIUM 50 MG AND SENNOSIDES 8.6 MG 1 TABLET: 8.6; 5 TABLET, FILM COATED ORAL at 22:08

## 2020-07-07 RX ADMIN — ACETAMINOPHEN 650 MG: 325 TABLET, FILM COATED ORAL at 03:39

## 2020-07-07 RX ADMIN — ACETAMINOPHEN 650 MG: 325 TABLET, FILM COATED ORAL at 11:58

## 2020-07-07 RX ADMIN — IBUPROFEN 800 MG: 800 TABLET, FILM COATED ORAL at 18:03

## 2020-07-07 RX ADMIN — DOCUSATE SODIUM 50 MG AND SENNOSIDES 8.6 MG 1 TABLET: 8.6; 5 TABLET, FILM COATED ORAL at 07:50

## 2020-07-07 RX ADMIN — IBUPROFEN 800 MG: 800 TABLET, FILM COATED ORAL at 11:58

## 2020-07-07 NOTE — PROGRESS NOTES
Anesthesia Post-Partum Follow-Up Note After  with Epidural    Patient: Rebecca Fischer    Patient location: Post-partum floor.    Anesthesia type: Epidural    Subjective:     She denies weakness, denies paresthesia, denies difficulties breathing or voiding, denies nausea or vomiting, and denies headache. She commented that her back is sore, but tolerable. She is able to ambulate and tolerates regular diet. Patient endorsed a positive anesthesia experience.    Objective:    Respiratory Function (RR / SpO2 / Airway Patency): Satisfactory    Cardiac Function (HR / Rhythm / BP): Satisfactory    Site of epidural insertion: No signs of infection or inflammation.     Last Vitals: /75   Pulse 84   Temp 36.7  C (98  F) (Oral)   Resp 16   LMP 10/03/2019 (Within Weeks)   SpO2 100%   Breastfeeding Unknown     Assessment and plan:   Rebecca Fischer is a 34 year old female  post-partum #1 s/p  . An epidural catheter was successfully inserted at the level of L2-3 without technical challenges. This successfully provided labor analgesia via PCEA pump infusing Bupivacaine 0.125% with Fentanyl 2mcg/mL. The patient delivered via  and the epidural catheter was removed immediately thereafter by the L&D RN.     At this time, there is no evidence of adverse side effects associated with the insertion or removal of the epidural catheter. She mentioned that her back is a bit sore, on exam she has diffuse paraspinal tenderness and the epidural site looks good - not bruising, swelling or erythema. If the patient develops new lower extremity paresis or paresthesias; or if there are concerns regarding the insertion site of the catheter, please reach out to the Dept of Anesthesia.    Thank you for including us in the care for this patient.    Esme MCGRATH  Anesthesia Resident, PGY3

## 2020-07-07 NOTE — PLAN OF CARE
Vss, postpartum assessment WDL. Taking tylenol and ibuprofen for pain control. Patient is using tucks to perineum. Breast feeding infant independently. Continue with plan of care.

## 2020-07-07 NOTE — PROGRESS NOTES
Post Partum Progress Note  PPD#1 s/p     Subjective:  She is resting comfortably in bed this morning. She complains of cramping pain. Overall, pain is well controlled on current medication regimen. She is tolerating PO intake. Lochia present and similar to menses.  She is voiding without difficulty. She has passed flatus. She is ambulating without dizziness or difficulty.  She denies headache, changes in vision, nausea/vomiting, chest pain, shortness of breath, RUQ pain, or worsening edema.  She endorses stable mood. She would prefer to stay until tomorrow if possible. Plans to breast feed.    Objective:  Patient Vitals for the past 24 hrs:   BP Temp Temp src Heart Rate Resp SpO2   20 0000 124/70 98.5  F (36.9  C) Oral -- 16 --   20 2000 133/81 98.2  F (36.8  C) Oral -- -- --   20 1620 118/78 98.7  F (37.1  C) Oral 85 16 --   20 0949 133/81 98.2  F (36.8  C) Oral 81 16 100 %   20 0850 118/71 -- -- -- 16 --   20 0835 118/67 -- -- -- 16 --   20 0820 113/63 -- -- -- 16 --   20 0805 116/77 -- -- -- 16 --   20 0753 117/75 -- -- -- 16 100 %   20 0740 114/73 97.7  F (36.5  C) Oral -- 18 100 %   20 0720 113/69 -- -- -- 18 99 %   20 0710 112/61 -- -- -- 18 98 %   ]  General: NAD, resting comfortably  CV: Regular rate, well perfused.   Pulm: Normal respiratory effort.  Abd: Soft, non-tender, non-distended. Fundus is firm and 2 cm below the umbilicus.    Ext: Trace lower extremity edema bilaterally. No calf tenderness.    Assessment/Plan:  Rebecca Fischer is a 34 year old  female who is PPD#1 s/p . Doing well postpartum. Stable mood.    # Postpartum:  - Encourage routine postpartum goals including ambulation and incentive spirometry  - PNC: Rh +. Rubella immune. No intervention indicated.  - Pain: controlled on oral medications, s/p 1x dose oxycodone   - Hgb 11.6>>10.3.   - GI: continue anti-emetics and stool softeners as needed.  -  : Voiding spontaneously.  - Infant: Stable in room.  - Feeding: Plans on breast feeding.  - BC: Undecided    # Depression:  - has been given rx for zoloft but states she does not take it, plan for postpartum mood check in 2 weeks.     Anticipate discharge to home on PPD#2    Vanessa Shepherd MD  Obstetrics and Gyncology, PGY-2  2020. 6:54 AM        Physician Attestation   I, Radha Carrillo MD, personally examined and evaluated this patient.  I discussed the patient with the resident/fellow and care team, and agree with the assessment and plan of care as documented in the note of 20.      I personally reviewed vital signs, medications, labs and exam.    Key findings: 34 year old  at 39w4d on PPD 1 s/p . Doing well. Working on breastfeeding. Anticipate discharge to home tomorrow morning. Mood stable, states she is not taking zoloft.   Radha Carrillo MD  Date of Service (when I saw the patient): 20

## 2020-07-07 NOTE — PLAN OF CARE
VSS, patient states pain is manageable. Resting overnight, responding to infant cues appropriately. Denies needs at this time. Continue with plan of care.

## 2020-07-08 ENCOUNTER — TELEPHONE (OUTPATIENT)
Dept: OBGYN | Facility: CLINIC | Age: 35
End: 2020-07-08

## 2020-07-08 VITALS
OXYGEN SATURATION: 100 % | DIASTOLIC BLOOD PRESSURE: 80 MMHG | TEMPERATURE: 98.4 F | RESPIRATION RATE: 16 BRPM | SYSTOLIC BLOOD PRESSURE: 119 MMHG | HEART RATE: 84 BPM

## 2020-07-08 PROCEDURE — 25000132 ZZH RX MED GY IP 250 OP 250 PS 637: Performed by: STUDENT IN AN ORGANIZED HEALTH CARE EDUCATION/TRAINING PROGRAM

## 2020-07-08 RX ADMIN — IBUPROFEN 800 MG: 800 TABLET, FILM COATED ORAL at 06:14

## 2020-07-08 RX ADMIN — ACETAMINOPHEN 650 MG: 325 TABLET, FILM COATED ORAL at 06:14

## 2020-07-08 RX ADMIN — DOCUSATE SODIUM 50 MG AND SENNOSIDES 8.6 MG 1 TABLET: 8.6; 5 TABLET, FILM COATED ORAL at 07:33

## 2020-07-08 NOTE — TELEPHONE ENCOUNTER
Pt delivered on 7/5/2020. TC to patient to help schedule 8 week post partum with IUD. LMTC.  Pt needs 2 week mood check phone visit. Message sent to Dr Ozuna. Could you do a telephone visit on 7/21 when at [place] or on-call on 7/23? Thanks.   Shila Martin, RN-BSN

## 2020-07-08 NOTE — CONSULTS
[facility]  MATERNAL [facility]ILD HEALTH   SOCIAL WORK ASSESSMENT    DATA:     Met with Rebecca to assess needs and offer support. Baby 'Hayden Mancia' was born 20 at 39w4d. Father 'Hayden' is involved and supportive. Wilder currently reside in [place], [place]. They have 7 children. SW was consulted due to Rebecca scoring 14 on the postpartum [place] Depression Screening.     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 Wilder did not have any questions, concerns, or resource needs at this time. They plan to discharge to home today at 11am.    Rebecca denies a significant mental health history. She endorsed a stable mood now but stated she has experienced some anxiety and depression in the past and felt she managed it well. Rebecca stated she was previously taking Vistaril but did not feel like it was doing much. She reported taking a higher dose in the hospital while she was in Labor and Delivery a few days ago, and felt like it worked better. She reported that she has an established provider to reach out to for medication management if she should choose to change medication or dosing. SW provided education about postpartum mood and anxiety disorders. Rebecca stated she knew the symptoms of anxiety and depress and what to watch out for. Hayden also stated he knew what to watch for and would be looking out for her. They reported having a strong network of family and friends.    INTERVENTION:     Introduced self and SW role. SW met with Rebecca to assess for needs and offer any resources prior to discharge. Provided psychoeducation on  mood and anxiety disorders. Provided Pregnancy and Postpartum Support [place] brochure and explained the various services offered.     ASSESSMENT:     Wilder were receptive to SW visit but were not too conversational, stating they did not need anything at this time and  feel equipped to handle any postpartum mood symptoms that may arise. Rebecca was bonding with her baby and  was at bedside. He appeared to be very nurturing and supportive.       PLAN:     Family planning to discharge today at 11am. Please re-consult SW if needs arise before then.     Shelly Bernstein [facility]  Coverage   Maternal Child Health

## 2020-07-08 NOTE — PROGRESS NOTES
Post Partum Progress Note  PPD#2 s/p     Subjective:  She is resting comfortably in bed this morning. She complains of cramping pain. Overall, pain is well controlled on current medication regimen. She is tolerating PO intake. Lochia present and similar to menses.  She is voiding without difficulty. She has passed flatus. She is ambulating without dizziness or difficulty.  She denies headache, changes in vision, nausea/vomiting, chest pain, shortness of breath, RUQ pain, or worsening edema.  She endorses stable mood. She plans discharge today. Plans to breast feed.    Objective:  Patient Vitals for the past 24 hrs:   BP Temp Temp src Heart Rate Resp   20 1804 119/79 98.1  F (36.7  C) Oral 86 16   20 0750 127/75 98  F (36.7  C) Oral 85 16   20 0000 124/70 98.5  F (36.9  C) Oral -- 16   ]  General: NAD, resting comfortably  CV: Regular rate, well perfused.   Pulm: Normal respiratory effort.  Abd: Soft, non-tender, non-distended. Fundus is firm and 2 cm below the umbilicus.    Ext: Trace lower extremity edema bilaterally. No calf tenderness.    Assessment/Plan:  Rebecca Fischer is a 34 year old  female who is PPD#2 s/p . Doing well postpartum. Stable mood.    # Postpartum:  - Encourage routine postpartum goals including ambulation and incentive spirometry  - PNC: Rh +. Rubella immune. No intervention indicated.  - Pain: controlled on oral medications, s/p 1x dose oxycodone   - Hgb 11.6>>10.3, stable.   - GI: continue anti-emetics and stool softeners as needed.  - : Voiding spontaneously.  - Infant: Stable in room.  - Feeding: Plans on breast feeding.  - BC: Undecided    # Depression:  - has been given rx for zoloft but states she does not take it, plan for postpartum mood check in 2 weeks.     Anticipate discharge to home today     MARY Cullen MD  Obstetrics and Gyncology, PGY-2  Ob-Gyn PGY-2 Pager: [phone]     Attestation:   This patient was seen and  evaluated by me, separately from the house staff team. I have reviewed the note/plan above and agree.     Doing well and ready for discharge home. Plan phone call for mood check in 2 weeks and pp visit in 8 weeks for IUD placement. Has meds OTC at home. No questions.    Hemoglobin   Date Value Ref Range Status   07/07/2020 10.3 (L) 11.7 - 15.7 g/dL Final   ]      Yanin Rios MD

## 2020-07-08 NOTE — DISCHARGE INSTRUCTIONS
Postpartum Vaginal Delivery Instructions    Activity       Ask family and friends for help when you need it.    Do not place anything in your vagina for 6 weeks.    You are not restricted on other activities, but take it easy for a few weeks to allow your body to recover from delivery.  You are able to do any activities you feel up to that point.    No driving until you have stopped taking your pain medications (usually two weeks after delivery).     Call your health care provider if you have any of these symptoms:       Increased pain, swelling, redness, or fluid around your stiches from an episiotomy or perineal tear.    A fever above 100.4 F (38 C) with or without chills when placing a thermometer under your tongue.    You soak a sanitary pad with blood within 1 hour, or you see blood clots larger than a golf ball.    Bleeding that lasts more than 6 weeks.    Vaginal discharge that smells bad.    Severe pain, cramping or tenderness in your lower belly area.    A need to urinate more frequently (use the toilet more often), more urgently (use the toilet very quickly), or it burns when you urinate.    Nausea and vomiting.    Redness, swelling or pain around a vein in your leg.    Problems breastfeeding or a red or painful area on your breast.    Chest pain and cough or are gasping for air.    Problems coping with sadness, anxiety, or depression.  If you have any concerns about hurting yourself or the baby, call your provider immediately.     You have questions or concerns after you return home.     Keep your hands clean:  Always wash your hands before touching your perineal area and stitches.  This helps reduce your risk of infection.  If your hands aren't dirty, you may use an alcohol hand-rub to clean your hands. Keep your nails clean and short.    IUD choices--Mirena (popular) good for 5 years with less bleeding or no cycles  ParaGard (no hormones) good for 10 years, cycles last day longer and heavier    Make  postpartum appointment in person for 8 weeks or more for placement.

## 2020-07-08 NOTE — PLAN OF CARE
Vital signs stable. Postpartum assessment WDL. Pain controlled with tylenol and ibuprofen. Patient voiding without difficulty. Breastfeeding on cue independently. Patient and infant bonding well. Will continue with current plan of care.

## 2020-07-08 NOTE — PLAN OF CARE
Vss, postpartum assessment WDL. Taking tylenol and ibuprofen for pain. Independent with breast feeding and self care. Discharge and home med instructions discussed with patient, all questions answered. Patient will have a virtual mood check in a week and knows to make a 6 week follow up appt with provider. Patient was discharged home at 1100.

## 2020-07-09 NOTE — TELEPHONE ENCOUNTER
TC to patient. Scheduled telephone visit for 7/23 with RR at 10am.   Unable to schedule PP appt due to no schedule available for Sept yet. Informed pt I would call her to schedule once we had that schedule. Pt aware.   Shila Martin, WINSOME-BSN

## 2020-07-21 ENCOUNTER — VIRTUAL VISIT (OUTPATIENT)
Dept: ONCOLOGY | Facility: CLINIC | Age: 35
End: 2020-07-21
Attending: OBSTETRICS & GYNECOLOGY
Payer: COMMERCIAL

## 2020-07-21 ENCOUNTER — PRE VISIT (OUTPATIENT)
Dept: ONCOLOGY | Facility: CLINIC | Age: 35
End: 2020-07-21

## 2020-07-21 DIAGNOSIS — D64.9 ANEMIA, UNSPECIFIED TYPE: ICD-10-CM

## 2020-07-21 PROCEDURE — 40001009 ZZH VIDEO/TELEPHONE VISIT; NO CHARGE

## 2020-07-21 PROCEDURE — 99203 OFFICE O/P NEW LOW 30 MIN: CPT | Mod: 95 | Performed by: INTERNAL MEDICINE

## 2020-07-21 NOTE — PATIENT INSTRUCTIONS
Your OB doctor has referred you here to the hematology clinic today today discuss anemia.  In reviewing her records it appears that all of your blood counts have been performed while you are pregnant and you have had slightly more severe anemia than as expected with several of your pregnancies.  Iron deficiency is a common cause of anemia, particularly with multiple pregnancies and you did have treatment with IV iron back in 2018.  Your OB doctor did perform an iron test called ferritin which is the best test of how much iron you have stored and this was normal back in December.    Overall, my impression is that this anemia is mild and may simply resolve after pregnancy.  We did see this happen in 2012 when her hemoglobin returned to 13.2 in January 2012.  So this may occur again with this pregnancy but let us make sure were not missing anything and repeat your testing in September.  I will also do some other testing to look for other causes of anemia.  I will plan to see you a few days after you have your blood test so we can review them at that time.    In the meantime if you have worsening fatigue or feel ill please do not hesitate to reach out to us and we could always do your testing sooner.  The blood tests have already been ordered and can be done at any time.    Please do not hesitate to reach out to us if questions or concerns in the meantime.  You can reach us through the [employer] system for call us at the clinic at [phone].  My nurse coordinator's name is Edith and she can always reach me if she doesn't have the answers to your questions.

## 2020-07-21 NOTE — PROGRESS NOTES
"Rebecca Fischer is a 34 year old female who is being evaluated via a billable video visit.      The patient has been notified of following:     \"This video visit will be conducted via a call between you and your physician/provider. We have found that certain health care needs can be provided without the need for an in-person physical exam.  This service lets us provide the care you need with a video conversation.  If a prescription is necessary we can send it directly to your pharmacy.  If lab work is needed we can place an order for that and you can then stop by our lab to have the test done at a later time.    Video visits are billed at different rates depending on your insurance coverage.  Please reach out to your insurance provider with any questions.    If during the course of the call the physician/provider feels a video visit is not appropriate, you will not be charged for this service.\"    Patient has given verbal consent for Video visit? Yes  How would you like to obtain your AVS? MyChart  If you are dropped from the video visit, the video invite should be resent to: Text to cell phone: [phone]   Will anyone else be joining your video visit? No       Vitals - Patient Reported  Weight (Patient Reported): 81.6 kg (180 lb)  Height (Patient Reported): 162.6 cm (5' 4\")  BMI (Based on Pt Reported Ht/Wt): 30.9  Pain Score: No Pain (0)      I have reviewed and updated the patient's allergies and medication list.      Angela Lo, [facility] Hematology Consultation    Outpatient Visit Note:    Patient: Rebecca Fischer  MRN: [id number]  : 1985  GINA: 2020    Reason for Consultation:  Rebecca Fischer is a referred by Dr Luiza Ozuna for evaluation and treatment of anemia.    History of Present Illness:  Rebecca Fischer is a 34 year old woman healthy woman who was recently pregnant and delivered her fourth child, who presents for evaluation of chronic anemia.  She " reports that she has always been found to be anemic during pregnancy when she was screened but is not aware that shew as tested due to symptoms such as fatigue.      2011 presented to establish care for her third pregnancy.  She recently had moved from [place] here to [place].  At that point was noted to have a hemoglobin of 10.3, blood type a positive with a negative antibody screen.  At the time of delivery in 2011 her hemoglobin found to be 9.4.  On follow-up her hemoglobin had returned to 13.2 in 2012.  Ferritin in 2012 was 47.    She had a spontaneous  in .  She was found to be anemic while pregnant in 2018 and treated with Venofer x 3 doses.    During this her 6th pregnancy, she was found to be mildly anemic at 10.7 in Dec 2019, 104 in 2020 and then when she delivered on , her Hgb was 11.6    She reports fatigue today but attributes this to interrupted sleep with the new baby.    Past Medical History:  Past Medical History:   Diagnosis Date     Anemia     on supplemental iron     NO ACTIVE PROBLEMS        Past Surgical History:  Past Surgical History:   Procedure Laterality Date     C BREAST AUGMENTATION         Medications:  Current Outpatient Medications   Medication Sig     Acetaminophen (TYLENOL PO)      acetaminophen (TYLENOL) 325 MG tablet Take 2 tablets (650 mg) by mouth every 6 hours as needed for mild pain Start after Delivery.     ECHINACEA EXTRACT PO      ferrous sulfate (FEROSUL) 325 (65 Fe) MG tablet Take 325 mg by mouth daily (with breakfast)     ibuprofen (ADVIL/MOTRIN) 600 MG tablet Take 1 tablet (600 mg) by mouth every 6 hours as needed for moderate pain Start after delivery     Multiple Vitamins-Minerals (EMERGEN-C IMMUNE PO)      Prenatal Vit-Fe Fumarate-FA (PRENATAL VITAMIN AND MINERAL PO)      senna-docusate (SENOKOT-S/PERICOLACE) 8.6-50 MG tablet Take 1 tablet by mouth daily Start after delivery.     UNABLE TO FIND MEDICATION  NAME: vitamin d3 magnesium     sertraline (ZOLOFT) 50 MG tablet Take 1 tablet (50 mg) by mouth daily (Patient not taking: Reported on 6/5/2020)     vitamin B6 (PYRIDOXINE) 25 MG tablet Take 25 mg by mouth daily     No current facility-administered medications for this visit.        Allergies:  No Known Allergies    ROS:  A 14 point ROS is negative except as stated in the HPI    Social History:  Denies any tobacco use. No significant alcohol use. Denies any illicit drug use. Patient works as an ICU nurse.    Family History:  None of blood disroders    Objective:  Exam:  Constitutional: Appears well, no distress  Eyes: no discharge, injection or icterus  Respiratory: no cough or labored breathing  Skin: no rashes or petechiae  Neurological: no deficits appreciated, speech is fluent  Psych: affect is normal      Labs:  Results for ISADORA FISCHER (MRN [id number]) as of 7/21/2020 09:31   Ref. Range 12/26/2019 12:42   Ferritin Latest Ref Range: 12 - 150 ng/mL 117     Hgb electrophoresis in April 2020 is normal    Results for ISADORA FISCHER (MRN [id number]) as of 7/21/2020 09:31   Ref. Range 7/5/2020 13:30 7/7/2020 07:49   WBC Latest Ref Range: 4.0 - 11.0 10e9/L 11.0    Hemoglobin Latest Ref Range: 11.7 - 15.7 g/dL 11.6 (L) 10.3 (L)   Hematocrit Latest Ref Range: 35.0 - 47.0 % 35.4        Imaging:  none    Assessment:  In summary, Isadora Fischer is a 34 year old woman with recurrent anemia during pregnancy, and now recently post partum and asymptomatic.  Overall my suspicion is that she had dilutional anemia of pregnancy but will complete an evaluation for anemia in the next several months as the excess blood volume of pregnancy resolves.  She had IV iron several years ago and ferritin during this pregnancy was normal, which suggests against iron deficiency.    Plan:  1. Majority of today's visit was spent counseling the patient regarding potential causes of anemia.  2. Check CBC, retic count, T&S, ferritin, B12 and  folate in about 2 months  3. RTC for discussion of lab results in 2 months    The patient is given our center's contact information and is instructed to call if she should have any further questions or concerns.      Dionisio Cooley MD   of Medicine  [facility] [employer]         Video-Visit Details    Type of service:  Video Visit    Video Start Time: 846 AM  Video End Time: 8:57 AM    Originating Location (pt. Location): Home    Distant Location (provider location):  [facility]     Platform used for Video Visit: Turn

## 2020-07-21 NOTE — LETTER
"    2020         RE: Rebecca Fischer  [address] [facility] [place] [address]        Dear Colleague,    Thank you for referring your patient, Rebecca Fischer, to the [facility]. Please see a copy of my visit note below.    Rebecca Fischer is a 34 year old female who is being evaluated via a billable video visit.        Vitals - Patient Reported  Weight (Patient Reported): 81.6 kg (180 lb)  Height (Patient Reported): 162.6 cm (5' 4\")  BMI (Based on Pt Reported Ht/Wt): 30.9  Pain Score: No Pain (0)      I have reviewed and updated the patient's allergies and medication list.      Angela Lo, [facility] Hematology Consultation    Outpatient Visit Note:    Patient: Rebecca Fischer  MRN: [id number]  : 1985  GINA: 2020    Reason for Consultation:  Rebecca Fischer is a referred by Dr Luiza Ozuna for evaluation and treatment of anemia.    History of Present Illness:  Rebecca Fischer is a 34 year old woman healthy woman who was recently pregnant and delivered her fourth child, who presents for evaluation of chronic anemia.  She reports that she has always been found to be anemic during pregnancy when she was screened but is not aware that shew as tested due to symptoms such as fatigue.      2011 presented to establish care for her third pregnancy.  She recently had moved from [place] here to [place].  At that point was noted to have a hemoglobin of 10.3, blood type a positive with a negative antibody screen.  At the time of delivery in 2011 her hemoglobin found to be 9.4.  On follow-up her hemoglobin had returned to 13.2 in 2012.  Ferritin in 2012 was 47.    She had a spontaneous  in .  She was found to be anemic while pregnant in 2018 and treated with Venofer x 3 doses.    During this her 6th pregnancy, she was found to be mildly anemic at 10.7 in Dec 2019, 104 in 2020 and then when she " delivered on July 5, her Hgb was 11.6    She reports fatigue today but attributes this to interrupted sleep with the new baby.    Past Medical History:  Past Medical History:   Diagnosis Date     Anemia     on supplemental iron     NO ACTIVE PROBLEMS        Past Surgical History:  Past Surgical History:   Procedure Laterality Date     C BREAST AUGMENTATION  2016       Medications:  Current Outpatient Medications   Medication Sig     Acetaminophen (TYLENOL PO)      acetaminophen (TYLENOL) 325 MG tablet Take 2 tablets (650 mg) by mouth every 6 hours as needed for mild pain Start after Delivery.     ECHINACEA EXTRACT PO      ferrous sulfate (FEROSUL) 325 (65 Fe) MG tablet Take 325 mg by mouth daily (with breakfast)     ibuprofen (ADVIL/MOTRIN) 600 MG tablet Take 1 tablet (600 mg) by mouth every 6 hours as needed for moderate pain Start after delivery     Multiple Vitamins-Minerals (EMERGEN-C IMMUNE PO)      Prenatal Vit-Fe Fumarate-FA (PRENATAL VITAMIN AND MINERAL PO)      senna-docusate (SENOKOT-S/PERICOLACE) 8.6-50 MG tablet Take 1 tablet by mouth daily Start after delivery.     UNABLE TO FIND MEDICATION NAME: vitamin d3 magnesium     sertraline (ZOLOFT) 50 MG tablet Take 1 tablet (50 mg) by mouth daily (Patient not taking: Reported on 6/5/2020)     vitamin B6 (PYRIDOXINE) 25 MG tablet Take 25 mg by mouth daily     No current facility-administered medications for this visit.        Allergies:  No Known Allergies    ROS:  A 14 point ROS is negative except as stated in the HPI    Social History:  Denies any tobacco use. No significant alcohol use. Denies any illicit drug use. Patient works as an ICU nurse.    Family History:  None of blood disroders    Objective:  Exam:  Constitutional: Appears well, no distress  Eyes: no discharge, injection or icterus  Respiratory: no cough or labored breathing  Skin: no rashes or petechiae  Neurological: no deficits appreciated, speech is fluent  Psych: affect is  normal      Labs:  Results for ISADORA FISCHER (MRN [id number]) as of 7/21/2020 09:31   Ref. Range 12/26/2019 12:42   Ferritin Latest Ref Range: 12 - 150 ng/mL 117     Hgb electrophoresis in April 2020 is normal    Results for ISADORA FISCHER (MRN [id number]) as of 7/21/2020 09:31   Ref. Range 7/5/2020 13:30 7/7/2020 07:49   WBC Latest Ref Range: 4.0 - 11.0 10e9/L 11.0    Hemoglobin Latest Ref Range: 11.7 - 15.7 g/dL 11.6 (L) 10.3 (L)   Hematocrit Latest Ref Range: 35.0 - 47.0 % 35.4        Imaging:  none    Assessment:  In summary, Isadora Fischer is a 34 year old woman with recurrent anemia during pregnancy, and now recently post partum and asymptomatic.  Overall my suspicion is that she had dilutional anemia of pregnancy but will complete an evaluation for anemia in the next several months as the excess blood volume of pregnancy resolves.  She had IV iron several years ago and ferritin during this pregnancy was normal, which suggests against iron deficiency.    Plan:  1. Majority of today's visit was spent counseling the patient regarding potential causes of anemia.  2. Check CBC, retic count, T&S, ferritin, B12 and folate in about 2 months  3. RTC for discussion of lab results in 2 months    The patient is given our center's contact information and is instructed to call if she should have any further questions or concerns.      Dionisio Cooley MD   of Medicine  [facility] [employer]         Video-Visit Details    Type of service:  Video Visit    Video Start Time: 846 AM  Video End Time: 8:57 AM    Originating Location (pt. Location): Home    Distant Location (provider location):  [facility]     Platform used for Video Visit: Well            Again, thank you for allowing me to participate in the care of your patient.        Sincerely,        Dionisio Cooley MD

## 2020-07-23 ENCOUNTER — VIRTUAL VISIT (OUTPATIENT)
Dept: OBGYN | Facility: CLINIC | Age: 35
End: 2020-07-23
Payer: COMMERCIAL

## 2020-07-23 ENCOUNTER — MYC MEDICAL ADVICE (OUTPATIENT)
Dept: OBGYN | Facility: CLINIC | Age: 35
End: 2020-07-23

## 2020-07-23 DIAGNOSIS — F41.8 POSTPARTUM ANXIETY: Primary | ICD-10-CM

## 2020-07-23 PROBLEM — Z34.90 SUPERVISION OF NORMAL PREGNANCY: Status: RESOLVED | Noted: 2019-12-05 | Resolved: 2020-07-23

## 2020-07-23 PROCEDURE — 99212 OFFICE O/P EST SF 10 MIN: CPT | Mod: 24 | Performed by: OBSTETRICS & GYNECOLOGY

## 2020-07-23 ASSESSMENT — ANXIETY QUESTIONNAIRES
7. FEELING AFRAID AS IF SOMETHING AWFUL MIGHT HAPPEN: NOT AT ALL
5. BEING SO RESTLESS THAT IT IS HARD TO SIT STILL: SEVERAL DAYS
GAD7 TOTAL SCORE: 7
2. NOT BEING ABLE TO STOP OR CONTROL WORRYING: SEVERAL DAYS
1. FEELING NERVOUS, ANXIOUS, OR ON EDGE: MORE THAN HALF THE DAYS
3. WORRYING TOO MUCH ABOUT DIFFERENT THINGS: SEVERAL DAYS
6. BECOMING EASILY ANNOYED OR IRRITABLE: SEVERAL DAYS

## 2020-07-23 ASSESSMENT — PATIENT HEALTH QUESTIONNAIRE - PHQ9
SUM OF ALL RESPONSES TO PHQ QUESTIONS 1-9: 7
5. POOR APPETITE OR OVEREATING: SEVERAL DAYS

## 2020-07-23 NOTE — Clinical Note
Please call patient and schedule her for a 8 wk PP visit with IUD insertion. She might want to go to [place]. Thanks RR

## 2020-07-23 NOTE — TELEPHONE ENCOUNTER
TC to patient. Scheduled PP and IUD insertion for 9/1 with Dr Ozuna at [place].   Shila Martin, WINSOME-BSN

## 2020-07-24 ASSESSMENT — ANXIETY QUESTIONNAIRES: GAD7 TOTAL SCORE: 7

## 2020-09-01 ENCOUNTER — PRENATAL OFFICE VISIT (OUTPATIENT)
Dept: OBGYN | Facility: CLINIC | Age: 35
End: 2020-09-01
Payer: COMMERCIAL

## 2020-09-01 VITALS
WEIGHT: 176 LBS | OXYGEN SATURATION: 97 % | DIASTOLIC BLOOD PRESSURE: 86 MMHG | BODY MASS INDEX: 30.21 KG/M2 | SYSTOLIC BLOOD PRESSURE: 130 MMHG | HEART RATE: 86 BPM

## 2020-09-01 DIAGNOSIS — N89.8 VAGINAL DISCHARGE: ICD-10-CM

## 2020-09-01 DIAGNOSIS — Z30.430 ENCOUNTER FOR INSERTION OF MIRENA IUD: ICD-10-CM

## 2020-09-01 LAB — HCG UR QL: NEGATIVE

## 2020-09-01 PROCEDURE — 58300 INSERT INTRAUTERINE DEVICE: CPT | Performed by: OBSTETRICS & GYNECOLOGY

## 2020-09-01 PROCEDURE — 99207 ZZC POST PARTUM EXAM: CPT | Performed by: OBSTETRICS & GYNECOLOGY

## 2020-09-01 PROCEDURE — 81025 URINE PREGNANCY TEST: CPT | Performed by: OBSTETRICS & GYNECOLOGY

## 2020-09-01 PROCEDURE — 87210 SMEAR WET MOUNT SALINE/INK: CPT | Performed by: OBSTETRICS & GYNECOLOGY

## 2020-09-01 NOTE — PROGRESS NOTES
SUBJECTIVE:  Rebecca Fischer is a 34 year old female   here for a postpartum visit.  She had a  on 20 delivering a healthy baby boy at term.      delivery complications:  No  breast feeding:  Yes, going well  bladder problems:  No  bowel problems/hemorrhoids:  No  episiotomy/laceration/incision healed? Yes  vaginal flow:  None  contraception:  IUD today  emotional adjustment: some anxiety, still but has improved some  back to work:  After 3 months.        OBJECTIVE:  Blood pressure 130/86, pulse 86, weight 79.8 kg (176 lb), last menstrual period 2020, SpO2 97 %, currently breastfeeding.   General - pleasant female in no acute distress.  Breast - no nodularity, asymmetry or nipple discharge bilaterally.  Abdomen - soft, nontender, nondistended, no hepatosplenomegaly.  Pelvic - EG: normal adult female, BUS: within normal limits, Vagina: well rugated, no discharge, Cervix: no lesions or CMT, Uterus: firm, normal sized and nontender, Adnexae: no masses or tenderness.  Rectovaginal - deferred.    ASSESSMENT:  normal postpartum exam    PLAN:  Discussed contraceptive options, wants mirena IUD today   May resume normal activities without restrictions  Pap smear was not  done today        Results for orders placed or performed in visit on 20   HCG Qual, Urine ([id number])     Status: None   Result Value Ref Range    HCG Qual Urine Negative NEG^Negative        IUD Insertion:  CONSULT:    Is a pregnancy test required: Yes.  Was it positive or negative?  Negative  Was a consent obtained?  Yes    Subjective: Rebecca Fischer is a 34 year old  presents for IUD and desires Mirena type IUD.    Patient has been given the opportunity to ask questions about all forms of birth control, including all options appropriate for Rebecca Fischer. Discussed that no method of birth control, except abstinence is 100% effective against pregnancy or sexually transmitted infection.     Rebecca Fischer understands she  may have the IUD removed at any time. IUD should be removed by a health care provider.    The entire insertion procedure was reviewed with the patient, including care after placement.    Patient's last menstrual period was 08/14/2020 (approximate).  No allergy to betadine or shellfish.   HCG Qual Urine   Date Value Ref Range Status   09/01/2020 Negative NEG^Negative Final     Comment:     This test is for screening purposes.  Results should be interpreted along with   the clinical picture.  Confirmation testing is available if warranted by   ordering [id number], HCG Quantitative Pregnancy.           /86   Pulse 86   Wt 79.8 kg (176 lb)   LMP 08/14/2020 (Approximate)   SpO2 97%   Breastfeeding Yes   BMI 30.21 kg/m      Pelvic Exam:   EG/BUS: normal genital architecture without lesions, erythema or abnormal secretions.   Vagina: moist, pink, rugae with physiologic discharge and secretions  Cervix: multiparous no lesions and pink, moist, closed, without lesion or CMT  Uterus: midposition, nontender  Adnexa: within normal limits and no masses, nodularity, tenderness    PROCEDURE NOTE: -- IUD Insertion    Reason for Insertion: contraception    Under sterile technique, cervix was visualized with speculum and prepped with Betadine solution swab x 3. Tenaculum was placed for stability. The uterus was gently straightened and sounded to 9.0 cm. IUD prepared for placement, and IUD inserted according to 's instructions without difficulty or significant resitance, and deployed at the fundus. The strings were visualized and trimmed to 3.0 cm from the external os. Tenaculum was removed and hemostasis noted. Speculum removed.  Patient tolerated procedure well.    Lot # see AM note     EBL: minimal    Complications: none    ASSESSMENT:     ICD-10-CM    1. Routine postpartum follow-up  Z39.2    2. Encounter for insertion of mirena IUD  Z30.430 HCG Qual, Urine ([id number])     levonorgestrel (MIRENA) 20 MCG/24HR IUD      levonorgestrel (MIRENA) 20 MCG/24HR IUD 20 mcg     INSERTION INTRAUTERINE DEVICE   3. Vaginal discharge  N89.8 Wet prep   4. Encounter for insertion of intrauterine contraceptive device  Z30.430         PLAN:    Given 's handouts, including when to have IUD removed, list of danger s/sx, side effects and follow up recommended.  Advised to call for any fever, for prolonged or severe pain or bleeding, abnormal vaginal discharge, or unable to palpate strings. She was advised to use pain medications (ibuprofen) as needed for mild to moderate pain. Advised to follow-up in clinic in 4-6 weeks for IUD string check if unable to find strings or as directed by provider.     Bruna Ozuna MD

## 2020-09-08 LAB
SPECIMEN SOURCE: NORMAL
WET PREP SPEC: NORMAL

## 2020-09-09 ENCOUNTER — MEDICAL CORRESPONDENCE (OUTPATIENT)
Dept: HEALTH INFORMATION MANAGEMENT | Facility: CLINIC | Age: 35
End: 2020-09-09

## 2020-09-22 ENCOUNTER — NURSE TRIAGE (OUTPATIENT)
Dept: NURSING | Facility: CLINIC | Age: 35
End: 2020-09-22

## 2020-09-22 ENCOUNTER — VIRTUAL VISIT (OUTPATIENT)
Dept: FAMILY MEDICINE | Facility: OTHER | Age: 35
End: 2020-09-22

## 2020-09-22 NOTE — TELEPHONE ENCOUNTER
She is calling to find out if everyone in her household should get tested for covid like she is or just her? I said only the ones with the symptoms should be tested at this point. If your test comes back positive , then others in the household should be tested and stay on home isolation. Mom agrees with plan of care.    Anisha Alarcon RN/ [facility]        Additional Information    Health Information question, no triage required and triager able to answer question    Protocols used: INFORMATION ONLY CALL-A-AH

## 2020-09-22 NOTE — PROGRESS NOTES
"Date: 2020 17:40:44  Clinician: Malia Arevalo  Clinician NPI: [id number]  Patient: Rebecca Fischer  Patient : 1985  Patient Address: [address] [place] [address]  Patient Phone: [phone]  Visit Protocol: URI  Patient Summary:  Rebecca is a 34 year old ( : 1985 ) female who initiated a [facility] Visit for COVID-19 (Coronavirus) evaluation and screening. When asked the question \"Please sign me up to receive news, health information and promotions. \", Rebecca responded \"No\".    Rebecca states her symptoms started 1-2 days ago.   Her symptoms consist of malaise, a sore throat, nasal congestion, and rhinitis.   Symptom details     Nasal secretions: The color of her mucus is white and clear.    Sore throat: Rebecca reports having mild throat pain (1-3 on a 10 point pain scale), does not have exudate on her tonsils, and can swallow liquids. The lymph nodes in her neck are not enlarged. A rash has not appeared on the skin since the sore throat started.      Rebecca denies having chills, facial pain or pressure, fever, teeth pain, ageusia, diarrhea, cough, headache, ear pain, anosmia, vomiting, nausea, wheezing, enlarged lymph nodes, and myalgias. She also denies taking antibiotic medication in the past month and having recent facial or sinus surgery in the past 60 days. She is not experiencing dyspnea.   Precipitating events  Within the past week, Rebecca has not been exposed to someone with strep throat.   Pertinent COVID-19 (Coronavirus) information  In the past 14 days, Rebecca has not worked in a congregate living setting.   She does not work or volunteer as healthcare worker or a  and does not work or volunteer in a healthcare facility.   Rebecca also has not lived in a congregate living setting in the past 14 days. She lives with a healthcare worker.   Rebecca has not had a close contact with a laboratory-confirmed COVID-19 patient within 14 days of symptom onset.   " Since December 2019, Rebecca and has not had upper respiratory infection or influenza-like illness. Has not been diagnosed with lab-confirmed COVID-19 test   Pertinent medical history  Rebecca does not get yeast infections when she takes antibiotics.   Rebecca does not need a return to work/school note.   Weight: 170 lbs   Rebecca does not smoke or use smokeless tobacco.   She denies pregnancy and is breastfeeding. She is currently menstruating.   Weight: 170 lbs    MEDICATIONS: Prenatal oral, ALLERGIES: NKDA  Clinician Response:  Dear Rebecca,         Your symptoms show that you may have coronavirus (COVID-19). This&nbsp;illness can cause fever, cough and trouble breathing. Many people get a mild case and get better on their own. Some people can get very sick.  What should I do?  We would like to test you for this virus.  1. Please call [phone] to schedule your visit. Explain that you were referred by [facility] to have a COVID-19 test. Be ready to [facility].  The following will serve as your written order for this COVID Test, ordered by me, for the indication of suspected COVID [Z20.828]: The test will be ordered in [facility], our electronic health record, after you are scheduled. It will show as ordered and authorized by Gonsalo Madison MD.  Order: COVID-19 (Coronavirus) PCR for SYMPTOMATIC testing from [facility].    2. When it's time for your COVID test:  Stay at least 6 feet away from others. (If someone will drive you to your test, stay in the backseat, as far away from the  as you can.)  Cover your mouth and nose with a mask, tissue or washcloth.  Go straight to the testing site. Don't make any stops on the way there or back.    3.Starting now:&nbsp;Stay home and away from others (self-isolate) until:   You've had&nbsp;no&nbsp;fever---and no medicine that reduces fever---for one full day (24 hours).&nbsp;And...  Your other symptoms have gotten better. For example, your cough or breathing  "has improved.&nbsp;And...  At least&nbsp;10 days&nbsp;have passed since your symptoms started.    During this time, don't leave the house except for testing or medical care.   Stay in your own room, even for meals. Use your own bathroom if you can.  Stay away from others in your home. No hugging, kissing or shaking hands. No visitors.  Don't go to work, school or anywhere else.   Clean \"high touch\" surfaces often (doorknobs, counters, handles, etc.). Use a household cleaning spray or wipes. You'll find a full list of  on the EPA website:&nbsp;www.epa.gov/pesticide-registration/list-n-disinfectants-use-against-sars-cov-2.   Cover your mouth and nose with a mask, tissue or washcloth to avoid spreading germs.  Wash your hands and face often. Use soap and water.  Caregivers in these groups are at risk for severe illness due to COVID-19:  o People 65 years and older  o People who live in a nursing home or long-term care facility  o People with chronic disease (lung, heart, cancer, diabetes, kidney, liver, immunologic)  o People who have a weakened immune system, including those who:   Are in cancer treatment  Take medicine that weakens the immune system, such as corticosteroids  Had a bone marrow or organ transplant  Have an immune deficiency  Have poorly controlled HIV or AIDS  Are obese (body mass index of 40 or higher)  Smoke regularly   o Caregivers should wear gloves while washing dishes, handling laundry and cleaning bedrooms and bathrooms.  o Use caution when washing and drying laundry: Don't shake dirty laundry, and use the warmest water setting that you can.  o For more tips, go to&nbsp;www.cdc.gov/coronavirus/2019-ncov/downloads/10Things.pdf.   How can I take care of myself?    Get lots of rest. Drink extra fluids&nbsp;(unless a doctor has told you not to).  Take Tylenol (acetaminophen) for fever or pain.&nbsp;If you have liver or kidney problems, ask your family doctor if it's okay to take Tylenol.   " Adults can take either:   650 mg (two 325 mg pills) every 4 to 6 hours,&nbsp;or...  1,000 mg (two 500 mg pills) every 8 hours as needed.  Note:&nbsp;Don't take more than 3,000 mg in one day. Acetaminophen is found in many medicines (both prescribed and over-the-counter medicines). Read all labels to be sure you don't take too much.   For children, check the Tylenol bottle for the right dose. The dose is based on the child's age or weight.   If you have other health problems (like cancer, heart failure, an organ transplant or severe kidney disease):&nbsp;Call your specialty clinic if you don't feel better in the next 2 days.    Know when to call 911.&nbsp;Emergency warning signs include:   Trouble breathing or shortness of breath Pain or pressure in the chest that doesn't go away Feeling confused like you haven't felt before, or not being able to wake up Bluish-colored lips or face.  Where can I get more information?   [facility] -- About COVID-19:&nbsp;[email or web address]  CDC -- What to Do If You're Sick:&nbsp;www.cdc.gov/coronavirus/2019-ncov/about/steps-when-sick.html  CDC -- Ending Home Isolation:&nbsp;www.cdc.gov/coronavirus/2019-ncov/hcp/disposition-in-home-patients.html  [facility] -- Caring for Someone:&nbsp;www.cdc.gov/coronavirus/2019-ncov/if-you-are-sick/care-for-someone.html  [facility] -- Interim Guidance for Hospital Discharge to Home:&nbsp;www.health.[facility].[place].us/diseases/coronavirus/hcp/hospdischarge.pdf  [facility] clinical trials (COVID-19 research studies):&nbsp;clinicalaffairs.[facility]/umn-clinical-trials  Below are the COVID-19 hotlines at the [place] Department of Health ([facility]). Interpreters are available.   For health questions: Call 039-362-2240 or 1-145.983.7581 (7 a.m. to 7 p.m.) For questions about schools and childcare: Call 711-374-2666 or 1-500.180.5729 (7 a.m. to 7 p.m.)           Diagnosis: Other malaise  Diagnosis ICD: R53.81

## 2020-09-23 DIAGNOSIS — Z20.822 SUSPECTED 2019 NOVEL CORONAVIRUS INFECTION: ICD-10-CM

## 2020-09-23 DIAGNOSIS — Z20.822 SUSPECTED 2019 NOVEL CORONAVIRUS INFECTION: Primary | ICD-10-CM

## 2020-09-23 PROCEDURE — U0003 INFECTIOUS AGENT DETECTION BY NUCLEIC ACID (DNA OR RNA); SEVERE ACUTE RESPIRATORY SYNDROME CORONAVIRUS 2 (SARS-COV-2) (CORONAVIRUS DISEASE [COVID-19]), AMPLIFIED PROBE TECHNIQUE, MAKING USE OF HIGH THROUGHPUT TECHNOLOGIES AS DESCRIBED BY CMS-2020-01-R: HCPCS | Performed by: FAMILY MEDICINE

## 2020-09-24 LAB
SARS-COV-2 RNA SPEC QL NAA+PROBE: NOT DETECTED
SPECIMEN SOURCE: NORMAL

## 2020-10-21 ENCOUNTER — APPOINTMENT (OUTPATIENT)
Dept: FAMILY MEDICINE | Facility: CLINIC | Age: 35
End: 2020-10-21
Payer: COMMERCIAL

## 2020-10-21 ENCOUNTER — RESULTS ONLY (OUTPATIENT)
Dept: LAB | Age: 35
End: 2020-10-21

## 2020-10-21 LAB
SARS-COV-2 RNA SPEC QL NAA+PROBE: NORMAL
SPECIMEN SOURCE: NORMAL

## 2020-10-22 LAB
LABORATORY COMMENT REPORT: NORMAL
SARS-COV-2 RNA SPEC QL NAA+PROBE: NEGATIVE
SPECIMEN SOURCE: NORMAL

## 2020-11-28 ENCOUNTER — E-VISIT (OUTPATIENT)
Dept: FAMILY MEDICINE | Facility: CLINIC | Age: 35
End: 2020-11-28
Payer: COMMERCIAL

## 2020-11-28 DIAGNOSIS — Z20.822 SUSPECTED COVID-19 VIRUS INFECTION: Primary | ICD-10-CM

## 2020-11-28 PROCEDURE — 99421 OL DIG E/M SVC 5-10 MIN: CPT | Performed by: NURSE PRACTITIONER

## 2020-11-28 NOTE — PATIENT INSTRUCTIONS
Dear Rebecca Fischer,    Your symptoms show that you may have coronavirus (COVID-19). This illness can cause fever, cough and trouble breathing. Many people get a mild case and get better on their own. Some people can get very sick.    Will I be tested for COVID-19?  We would like to test you for Covid-19 virus. I have placed orders for this test.     For all employees or close contacts (except [place] and [employer] - see below), go to your [employer] home page and scroll down to the section that says  You have an appointment that needs to be scheduled  and click the large green button that says  Schedule Now  and follow the steps to find the next available opening.     If you are unable to complete these steps or if you cannot find any available times, please call [phone] to schedule employee testing.       [place] employees or close contacts, please call [phone].   [place]) employees or close contacts call [phone].    When it's time for your COVID test:  Stay at least 6 feet away from others. (If someone will drive you to your test, stay in the backseat, as far away from the  as you can.)  Cover your mouth and nose with a mask, tissue or washcloth.  Go straight to the testing site. Don't make any stops on the way there or back.    Starting now:     Do not go to work.   o If you receive a negative COVID-19 test result and were NOT exposed to someone with a known positive COVID-19 test, you can return to work once you're free of fever for 24 hours without fever-reducing medication and your symptoms are improving or resolved.  o If you receive a positive COVID-19 test result, you must be cleared by Employee Occupational Health and Safety to return to work.   o If you were exposed to someone who has tested positive for COVID-19, you can return to work 14 days after your last contact with the positive individual, provided you do not have symptoms at all during that time. In some cases,  "your manager may ask you to come back sooner than 14 days.     During this time, don't leave the house except for testing or medical care.  o Stay in your own room, even for meals. Use your own bathroom if you can.  o Stay away from others in your home. No hugging, kissing or shaking hands. No visitors.  o Don't go to work, school or anywhere else.    Clean \"high touch\" surfaces often (doorknobs, counters, handles, etc.). Use a household cleaning spray or wipes. You'll find a full list of  on the EPA website: www.epa.gov/pesticide-registration/list-n-disinfectants-use-against-sars-cov-2.    Cover your mouth and nose with a mask, tissue or washcloth to avoid spreading germs.    Wash your hands and face often. Use soap and water.    People in these groups are at risk for severe illness due to COVID-19:  o People 65 years and older  o People who live in a nursing home or long-term care facility  o People with chronic disease (lung, heart, cancer, diabetes, kidney, liver, immunologic)  o People who have a weakened immune system, including those who:  - Are in cancer treatment  - Take medicine that weakens the immune system, such as corticosteroids  - Had a bone marrow or organ transplant  - Have an immune deficiency  - Have poorly controlled HIV or AIDS  - Are obese (body mass index of 40 or higher)  - Smoke regularly      Caregivers should wear gloves while washing dishes, handling laundry and cleaning bedrooms and bathrooms.    Use caution when washing and drying laundry: Don't shake dirty laundry, and use the warmest water setting that you can.    For more tips, go to www.cdc.gov/coronavirus/2019-ncov/downloads/10Things.pdf.    Sign up for [employer]. We know it's scary to hear that you might have COVID-19. We want to track your symptoms to make sure you're okay over the next 2 weeks. Please look for an email from [employer]--this is a free, online program that we'll use to keep in touch. To sign up, " follow the link in the email you will receive. Learn more at [email or web address]    How can I take care of myself?    Get lots of rest. Drink extra fluids (unless a doctor has told you not to)    Take Tylenol (acetaminophen) for fever or pain. If you have liver or kidney problems, ask your family doctor if it's okay to take Tylenol.  Adults can take either:    650 mg (two 325 mg pills) every 4 to 6 hours, or     1,000 mg (two 500 mg pills) every 8 hours as needed.    Note: Don't take more than 3,000 mg in one day. Acetaminophen is found in many medicines (both prescribed and over-the-counter medicines). Read all labels to be sure you don't take too much.  For children, check the Tylenol bottle for the right dose. The dose is based on the child's age or weight.    If you have other health problems (like cancer, heart failure, an organ transplant or severe kidney disease): Call your specialty clinic if you don't feel better in the next 2 days.    Know when to call 911. Emergency warning signs include:  Trouble breathing or shortness of breath  Pain or pressure in the chest that doesn't go away  Feeling confused like you haven't felt before, or not being able to wake up  Bluish-colored lips or face    Where can I get more information?    [employer] [facility] - About COVID-19: [email or web address]  CDC - What to Do If You're Sick: www.cdc.gov/coronavirus/2019-ncov/about/steps-when-sick.html  November 28, 2020    RE:  Rebecca Fischer                                                                                                                                                       [facility] [address] [facility] [address]        To whom it may concern:    I evaluated Rebecca Fischer on 11/28/20. Rebecca Fischer should be excused from work/school.     Do not go to work.      If you receive a negative COVID-19 test result and were NOT exposed to someone with a known positive COVID-19 test, you can  return to work once you're free of fever for 24 hours without fever-reducing medication and your symptoms are improving or resolved.    If you receive a positive COVID-19 test result, you must be cleared by Employee Occupational Health and Safety to return to work.     If you were exposed to someone who has tested positive for COVID-19, you can return to work 14 days after your last contact with the positive individual, provided you do not have symptoms at all during that time. In some cases, your manager may ask you to come back sooner than 14 days.       Sincerely,  Medina Moon, NP

## 2020-11-30 DIAGNOSIS — Z20.822 SUSPECTED COVID-19 VIRUS INFECTION: ICD-10-CM

## 2020-11-30 PROCEDURE — U0003 INFECTIOUS AGENT DETECTION BY NUCLEIC ACID (DNA OR RNA); SEVERE ACUTE RESPIRATORY SYNDROME CORONAVIRUS 2 (SARS-COV-2) (CORONAVIRUS DISEASE [COVID-19]), AMPLIFIED PROBE TECHNIQUE, MAKING USE OF HIGH THROUGHPUT TECHNOLOGIES AS DESCRIBED BY CMS-2020-01-R: HCPCS | Performed by: NURSE PRACTITIONER

## 2020-12-01 LAB
SARS-COV-2 RNA SPEC QL NAA+PROBE: NOT DETECTED
SPECIMEN SOURCE: NORMAL

## 2020-12-03 ENCOUNTER — E-VISIT (OUTPATIENT)
Dept: URGENT CARE | Facility: URGENT CARE | Age: 35
End: 2020-12-03
Payer: COMMERCIAL

## 2020-12-03 DIAGNOSIS — Z20.822 SUSPECTED COVID-19 VIRUS INFECTION: Primary | ICD-10-CM

## 2020-12-03 PROCEDURE — 99421 OL DIG E/M SVC 5-10 MIN: CPT | Performed by: PHYSICIAN ASSISTANT

## 2020-12-03 NOTE — PATIENT INSTRUCTIONS
Dear Rebecca Fischer,    Your symptoms show that you may have coronavirus (COVID-19). This illness can cause fever, cough and trouble breathing. Many people get a mild case and get better on their own. Some people can get very sick.    Will I be tested for COVID-19?  We would like to test you for Covid-19 virus. I have placed orders for this test.     For all employees or close contacts (except [place] and [employer] - see below), go to your [employer] home page and scroll down to the section that says  You have an appointment that needs to be scheduled  and click the large green button that says  Schedule Now  and follow the steps to find the next available opening.     If you are unable to complete these steps or if you cannot find any available times, please call [phone] to schedule employee testing.       [place] employees or close contacts, please call [phone].   [place] [employer]) employees or close contacts call [phone].    When it's time for your COVID test:  Stay at least 6 feet away from others. (If someone will drive you to your test, stay in the backseat, as far away from the  as you can.)  Cover your mouth and nose with a mask, tissue or washcloth.  Go straight to the testing site. Don't make any stops on the way there or back.    Starting now:     Do not go to work.   o If you receive a negative COVID-19 test result and were NOT exposed to someone with a known positive COVID-19 test, you can return to work once you're free of fever for 24 hours without fever-reducing medication and your symptoms are improving or resolved.  o If you receive a positive COVID-19 test result, you must be cleared by Employee Occupational Health and Safety to return to work.   o If you were exposed to someone who has tested positive for COVID-19, you can return to work 14 days after your last contact with the positive individual, provided you do not have symptoms at all during that time. In some cases,  "your manager may ask you to come back sooner than 14 days.     During this time, don't leave the house except for testing or medical care.  o Stay in your own room, even for meals. Use your own bathroom if you can.  o Stay away from others in your home. No hugging, kissing or shaking hands. No visitors.  o Don't go to work, school or anywhere else.    Clean \"high touch\" surfaces often (doorknobs, counters, handles, etc.). Use a household cleaning spray or wipes. You'll find a full list of  on the EPA website: www.epa.gov/pesticide-registration/list-n-disinfectants-use-against-sars-cov-2.    Cover your mouth and nose with a mask, tissue or washcloth to avoid spreading germs.    Wash your hands and face often. Use soap and water.    People in these groups are at risk for severe illness due to COVID-19:  o People 65 years and older  o People who live in a nursing home or long-term care facility  o People with chronic disease (lung, heart, cancer, diabetes, kidney, liver, immunologic)  o People who have a weakened immune system, including those who:  - Are in cancer treatment  - Take medicine that weakens the immune system, such as corticosteroids  - Had a bone marrow or organ transplant  - Have an immune deficiency  - Have poorly controlled HIV or AIDS  - Are obese (body mass index of 40 or higher)  - Smoke regularly      Caregivers should wear gloves while washing dishes, handling laundry and cleaning bedrooms and bathrooms.    Use caution when washing and drying laundry: Don't shake dirty laundry, and use the warmest water setting that you can.    For more tips, go to www.cdc.gov/coronavirus/2019-ncov/downloads/10Things.pdf.    Sign up for [employer]. We know it's scary to hear that you might have COVID-19. We want to track your symptoms to make sure you're okay over the next 2 weeks. Please look for an email from [employer]--this is a free, online program that we'll use to keep in touch. To sign up, " follow the link in the email you will receive. Learn more at [email or web address]    How can I take care of myself?    Get lots of rest. Drink extra fluids (unless a doctor has told you not to)    Take Tylenol (acetaminophen) for fever or pain. If you have liver or kidney problems, ask your family doctor if it's okay to take Tylenol.  Adults can take either:    650 mg (two 325 mg pills) every 4 to 6 hours, or     1,000 mg (two 500 mg pills) every 8 hours as needed.    Note: Don't take more than 3,000 mg in one day. Acetaminophen is found in many medicines (both prescribed and over-the-counter medicines). Read all labels to be sure you don't take too much.  For children, check the Tylenol bottle for the right dose. The dose is based on the child's age or weight.    If you have other health problems (like cancer, heart failure, an organ transplant or severe kidney disease): Call your specialty clinic if you don't feel better in the next 2 days.    Know when to call 911. Emergency warning signs include:  Trouble breathing or shortness of breath  Pain or pressure in the chest that doesn't go away  Feeling confused like you haven't felt before, or not being able to wake up  Bluish-colored lips or face    Where can I get more information?    [facility] - About COVID-19: [email or web address]  CDC - What to Do If You're Sick: www.cdc.gov/coronavirus/2019-ncov/about/steps-when-sick.html  December 3, 2020    RE:  Rebecca Fischer                                                                                                                                                       [facility] [address] [facility] [address]        To whom it may concern:    I evaluated Rebecca Fischer on 12/03/20. Rebecca Fischer should be excused from work/school.     Do not go to work.      If you receive a negative COVID-19 test result and were NOT exposed to someone with a known positive COVID-19 test, you can  return to work once you're free of fever for 24 hours without fever-reducing medication and your symptoms are improving or resolved.    If you receive a positive COVID-19 test result, you must be cleared by Employee Occupational Health and Safety to return to work.     If you were exposed to someone who has tested positive for COVID-19, you can return to work 14 days after your last contact with the positive individual, provided you do not have symptoms at all during that time. In some cases, your manager may ask you to come back sooner than 14 days.       Sincerely,  Singh Higginbotham PA-C

## 2020-12-04 ENCOUNTER — APPOINTMENT (OUTPATIENT)
Dept: FAMILY MEDICINE | Facility: CLINIC | Age: 35
End: 2020-12-04
Payer: COMMERCIAL

## 2020-12-14 ENCOUNTER — HEALTH MAINTENANCE LETTER (OUTPATIENT)
Age: 35
End: 2020-12-14

## 2020-12-29 ENCOUNTER — OFFICE VISIT (OUTPATIENT)
Dept: OBGYN | Facility: CLINIC | Age: 35
End: 2020-12-29
Payer: COMMERCIAL

## 2020-12-29 VITALS
SYSTOLIC BLOOD PRESSURE: 139 MMHG | HEART RATE: 98 BPM | TEMPERATURE: 98.2 F | DIASTOLIC BLOOD PRESSURE: 76 MMHG | WEIGHT: 172.2 LBS | BODY MASS INDEX: 29.4 KG/M2 | HEIGHT: 64 IN

## 2020-12-29 DIAGNOSIS — Z30.431 INTRAUTERINE DEVICE SURVEILLANCE: Primary | ICD-10-CM

## 2020-12-29 PROCEDURE — 99212 OFFICE O/P EST SF 10 MIN: CPT | Performed by: OBSTETRICS & GYNECOLOGY

## 2020-12-29 SDOH — HEALTH STABILITY: MENTAL HEALTH: HOW OFTEN DO YOU HAVE A DRINK CONTAINING ALCOHOL?: MONTHLY OR LESS

## 2020-12-29 SDOH — HEALTH STABILITY: MENTAL HEALTH: HOW MANY STANDARD DRINKS CONTAINING ALCOHOL DO YOU HAVE ON A TYPICAL DAY?: NOT ASKED

## 2020-12-29 SDOH — HEALTH STABILITY: MENTAL HEALTH: HOW OFTEN DO YOU HAVE 6 OR MORE DRINKS ON ONE OCCASION?: NOT ASKED

## 2020-12-29 ASSESSMENT — MIFFLIN-ST. JEOR: SCORE: 1471.09

## 2020-12-29 NOTE — PROGRESS NOTES
"Chief Complaint   Patient presents with     IUD     RECHECK       Initial /76 (BP Location: Right arm, Patient Position: Sitting, Cuff Size: Adult Regular)   Pulse 98   Temp 98.2  F (36.8  C) (Oral)   Ht 1.626 m (5' 4\")   Wt 78.1 kg (172 lb 3.2 oz)   LMP 2020 (Approximate)   Breastfeeding Yes   BMI 29.56 kg/m   Estimated body mass index is 29.56 kg/m  as calculated from the following:    Height as of this encounter: 1.626 m (5' 4\").    Weight as of this encounter: 78.1 kg (172 lb 3.2 oz).  BP completed using cuff size: regular    Questioned patient about current smoking habits.  Pt. quit smoking some time ago.          The following HM Due: NONE      The following patient reported/Care Every where data was sent to:  P ABSTRACT QUALITY INITIATIVES [[id number]]  n/a      n/a and patient has appointment for today        Rebecca Fischer is 35 year old female here for an IUD check.  She had a mirena IUD placed about 3-4 month ago, without complication.  Since then she has been doing well. Just recently stopped bleeding. No discomfort.     OBJECTIVE:  /76 (BP Location: Right arm, Patient Position: Sitting, Cuff Size: Adult Regular)   Pulse 98   Temp 98.2  F (36.8  C) (Oral)   Ht 1.626 m (5' 4\")   Wt 78.1 kg (172 lb 3.2 oz)   LMP 2020 (Approximate)   Breastfeeding Yes   BMI 29.56 kg/m     normal respiratory and cardiovascular effort     Pelvic:  EG - normal adult female.  BUS - within normal limits.  Vagina - well rugated, no discharge.  Cervix - no lesions, no CMT.   IUD strings noted at the cervix about 3 cm long.    ASSESSMENT:  IUD well placed    PLAN:  return to clinic when due for next annual physical exam  or with any concerns in regards to her IUD.        "

## 2021-04-17 ENCOUNTER — HEALTH MAINTENANCE LETTER (OUTPATIENT)
Age: 36
End: 2021-04-17

## 2021-05-31 VITALS — WEIGHT: 130 LBS | BODY MASS INDEX: 22.2 KG/M2 | HEIGHT: 64 IN

## 2021-06-11 NOTE — PROGRESS NOTES
Assessment/ Plan     1. Amenorrhea  Patient's urine pregnancy test was negative today.  As she had 2 positives at home, will send serum beta hCG.  It is possible that she had a complete spontaneous first trimester miscarriage.  Further plan pending results.  Will call patient with results.  Will confirm blood test at that time if it looks like she had a miscarriage.  If early pregnancy, we will talk about following up for first OB visit.  - Pregnancy, Urine  - Beta-hCG, Quantitative    2. Anxiety  Patient has a history of anxiety for which she has been treating it with long-term lorazepam.  This certainly would not be appropriate in pregnancy and she agrees.  She does not want to be on anything else long-term like an SSRI.  BRETT 7 equals 18.      Subjective:       Rebecca Ahumada is a 31 y.o. female who presents for possible pregnancy confirmation.  Patient is a new to the clinic today she comes in today with her significant other, Hayden.  She states that she is very regular with her periods.  Her last normal period started on May 2.  Then she started with some bleeding again on May 27 and this lasted through the sixth.  It was lighter and longer than typical for her.  She did not have any cramping, clots, or pass any tissue.  She took 2 home pregnancy tests, the last being on June 8.  Both of those were positive.  This was an unplanned pregnancy, but they are happy about it.  She has been under a lot of stress recently.  She recently moved back here from [place] where she was for 1 year.  She is working as an RN at [employer].  She did have an episode of syncope about 3 weeks ago.  She states it is not unusual for her to have fainting spells.  She states that she has had a long history of anxiety.  She was treating this with lorazepam.  She was taking it sometimes every day for a week or so and then she would need it for a while.  She does not want to be on anything like an SSRI.  We discussed that is  "probably not the best treatment, long-term benzodiazepines.  She feels like now things are can calm down and be less stressful.  She feels like she has a good support system.  She has had 3 vaginal deliveries without any complications.  Family history history significant for maternal grandmother with breast cancer.  She has had no surgeries.    The following portions of the patient's history were reviewed and updated as appropriate: allergies, current medications, past family history, past medical history, past social history, past surgical history and problem list.    Review of Systems   A 12 point comprehensive review of systems was negative except as noted.      Current Outpatient Prescriptions   Medication Sig Dispense Refill     PRENATAL VIT CALC,IRON,FOLIC (PRENATAL VITAMIN ORAL) Take by mouth.       No current facility-administered medications for this visit.        Objective:      /68  Pulse 78  Temp 98  F (36.7  C) (Oral)   Resp 16  Ht 5' 4\" (1.626 m)  Wt 130 lb (59 kg)  LMP 05/02/2017  BMI 22.31 kg/m2      General appearance: alert, appears stated age and cooperative  Good eye contact and social smile, speech is normal in fluency and content.  Heart regular rate and rhythm  Lungs clear to auscultation  BRETT 7 equals 14    Recent Results (from the past 168 hour(s))   Pregnancy, Urine   Result Value Ref Range    Pregnancy Test, Urine Negative Negative    Specific Gravity, UA 1.010 1.001 - 1.030          This note has been dictated using voice recognition software. Any grammatical or context distortions are unintentional and inherent to the software  "

## 2021-10-02 ENCOUNTER — HEALTH MAINTENANCE LETTER (OUTPATIENT)
Age: 36
End: 2021-10-02

## 2022-05-01 ASSESSMENT — ENCOUNTER SYMPTOMS
FREQUENCY: 0
HEMATOCHEZIA: 0
PARESTHESIAS: 1
FEVER: 0
HEADACHES: 1
SORE THROAT: 0
ABDOMINAL PAIN: 0
COUGH: 0
DIZZINESS: 0
HEMATURIA: 0
CONSTIPATION: 0
NERVOUS/ANXIOUS: 1
HEARTBURN: 0
SHORTNESS OF BREATH: 0
DIARRHEA: 0
DYSURIA: 0
PALPITATIONS: 0
JOINT SWELLING: 0
ARTHRALGIAS: 0
CHILLS: 0
NAUSEA: 0
BREAST MASS: 0
WEAKNESS: 0
MYALGIAS: 0

## 2022-05-01 ASSESSMENT — ANXIETY QUESTIONNAIRES
3. WORRYING TOO MUCH ABOUT DIFFERENT THINGS: NEARLY EVERY DAY
5. BEING SO RESTLESS THAT IT IS HARD TO SIT STILL: NEARLY EVERY DAY
GAD7 TOTAL SCORE: 21
4. TROUBLE RELAXING: NEARLY EVERY DAY
GAD7 TOTAL SCORE: 21
7. FEELING AFRAID AS IF SOMETHING AWFUL MIGHT HAPPEN: NEARLY EVERY DAY
1. FEELING NERVOUS, ANXIOUS, OR ON EDGE: NEARLY EVERY DAY
7. FEELING AFRAID AS IF SOMETHING AWFUL MIGHT HAPPEN: NEARLY EVERY DAY
6. BECOMING EASILY ANNOYED OR IRRITABLE: NEARLY EVERY DAY
GAD7 TOTAL SCORE: 21
2. NOT BEING ABLE TO STOP OR CONTROL WORRYING: NEARLY EVERY DAY

## 2022-05-01 ASSESSMENT — PATIENT HEALTH QUESTIONNAIRE - PHQ9
SUM OF ALL RESPONSES TO PHQ QUESTIONS 1-9: 7
10. IF YOU CHECKED OFF ANY PROBLEMS, HOW DIFFICULT HAVE THESE PROBLEMS MADE IT FOR YOU TO DO YOUR WORK, TAKE CARE OF THINGS AT HOME, OR GET ALONG WITH OTHER PEOPLE: SOMEWHAT DIFFICULT
SUM OF ALL RESPONSES TO PHQ QUESTIONS 1-9: 7

## 2022-05-02 ENCOUNTER — OFFICE VISIT (OUTPATIENT)
Dept: FAMILY MEDICINE | Facility: CLINIC | Age: 37
End: 2022-05-02
Payer: COMMERCIAL

## 2022-05-02 VITALS
HEART RATE: 104 BPM | HEIGHT: 65 IN | BODY MASS INDEX: 24.66 KG/M2 | TEMPERATURE: 98.7 F | WEIGHT: 148.03 LBS | OXYGEN SATURATION: 100 % | DIASTOLIC BLOOD PRESSURE: 76 MMHG | SYSTOLIC BLOOD PRESSURE: 130 MMHG

## 2022-05-02 DIAGNOSIS — M20.10 HALLUX VALGUS, ACQUIRED, UNSPECIFIED LATERALITY: ICD-10-CM

## 2022-05-02 DIAGNOSIS — G44.86 CERVICOGENIC HEADACHE: ICD-10-CM

## 2022-05-02 DIAGNOSIS — Z00.00 ROUTINE GENERAL MEDICAL EXAMINATION AT A HEALTH CARE FACILITY: Primary | ICD-10-CM

## 2022-05-02 DIAGNOSIS — Z80.8 FAMILY HISTORY OF MELANOMA: ICD-10-CM

## 2022-05-02 DIAGNOSIS — M54.2 NECK PAIN: ICD-10-CM

## 2022-05-02 DIAGNOSIS — F41.1 GENERALIZED ANXIETY DISORDER: ICD-10-CM

## 2022-05-02 PROCEDURE — 99214 OFFICE O/P EST MOD 30 MIN: CPT | Mod: 25 | Performed by: PHYSICIAN ASSISTANT

## 2022-05-02 PROCEDURE — 99395 PREV VISIT EST AGE 18-39: CPT | Performed by: PHYSICIAN ASSISTANT

## 2022-05-02 RX ORDER — ESCITALOPRAM OXALATE 10 MG/1
10 TABLET ORAL DAILY
Qty: 60 TABLET | Refills: 0 | Status: SHIPPED | OUTPATIENT
Start: 2022-05-02 | End: 2022-05-31

## 2022-05-02 RX ORDER — ALPRAZOLAM 0.5 MG
0.5 TABLET ORAL
Qty: 8 TABLET | Refills: 0 | Status: SHIPPED | OUTPATIENT
Start: 2022-05-02 | End: 2022-05-31

## 2022-05-02 ASSESSMENT — ENCOUNTER SYMPTOMS
ARTHRALGIAS: 0
WEAKNESS: 0
PALPITATIONS: 0
NAUSEA: 0
HEMATURIA: 0
COUGH: 0
MYALGIAS: 0
BREAST MASS: 0
DIARRHEA: 0
DYSURIA: 0
HEADACHES: 1
CHILLS: 0
CONSTIPATION: 0
JOINT SWELLING: 0
HEMATOCHEZIA: 0
SHORTNESS OF BREATH: 0
SORE THROAT: 0
NERVOUS/ANXIOUS: 1
FEVER: 0
FREQUENCY: 0
PARESTHESIAS: 1
ABDOMINAL PAIN: 0
HEARTBURN: 0
DIZZINESS: 0

## 2022-05-02 ASSESSMENT — ANXIETY QUESTIONNAIRES: GAD7 TOTAL SCORE: 21

## 2022-05-02 ASSESSMENT — PATIENT HEALTH QUESTIONNAIRE - PHQ9: SUM OF ALL RESPONSES TO PHQ QUESTIONS 1-9: 7

## 2022-05-02 ASSESSMENT — PAIN SCALES - GENERAL: PAINLEVEL: NO PAIN (0)

## 2022-05-02 NOTE — PROGRESS NOTES
SUBJECTIVE:   CC: Rebecca Fischer is an 36 year old woman who presents for preventive health visit.     Patient has been advised of split billing requirements and indicates understanding: Yes  Healthy Habits:     Getting at least 3 servings of Calcium per day:  Yes    Bi-annual eye exam:  NO    Dental care twice a year:  Yes    Sleep apnea or symptoms of sleep apnea:  Daytime drowsiness    Diet:  Regular (no restrictions)    Frequency of exercise:  2-3 days/week    Duration of exercise:  30-45 minutes    Taking medications regularly:  Yes    Medication side effects:  Not applicable    PHQ-2 Total Score: 2    Additional concerns today:  Yes    Skin checks. Grew up in [place]. Mom had melanoma. Has a spot on her right shoulder.    Neck pain. Has seen chiropractor, PT.   Chiropractor did x-rays a number of years ago. Getting headaches frequently.     Anxiety.   BRETT-7 SCORE 6/2/2020 7/23/2020 5/1/2022   Total Score 20 (severe anxiety) - 21 (severe anxiety)   Total Score 20 7 21   Going to a therapy visit with her . Interested in therapy.   Has used xanax in the past. Hydroxyzine didn't work. Was prescribed sertraline - but didn't start as she was pregnant.    Has a bunion. Bothers her after long work weeks.       Today's PHQ-2 Score:   PHQ-2 ( 1999 [employer]) 5/1/2022   Q1: Little interest or pleasure in doing things 1   Q2: Feeling down, depressed or hopeless 1   PHQ-2 Score 2   PHQ-2 Total Score (12-17 Years)- Positive if 3 or more points; Administer PHQ-A if positive -   Q1: Little interest or pleasure in doing things Several days   Q2: Feeling down, depressed or hopeless Several days   PHQ-2 Score 2       Abuse: Current or Past (Physical, Sexual or Emotional) - No  Do you feel safe in your environment? Yes    Have you ever done Advance Care Planning? (For example, a Health Directive, POLST, or a discussion with a medical provider or your loved ones about your wishes): No, advance care planning  information given to patient to review.  Patient declined advance care planning discussion at this time.    Social History     Tobacco Use     Smoking status: Former Smoker     Quit date: 12/5/2006     Years since quitting: 15.4     Smokeless tobacco: Never Used   Substance Use Topics     Alcohol use: Yes         No flowsheet data found.    Reviewed orders with patient.  Reviewed health maintenance and updated orders accordingly - Yes  BP Readings from Last 3 Encounters:   05/02/22 130/76   12/29/20 139/76   09/01/20 130/86    Wt Readings from Last 3 Encounters:   05/02/22 67.1 kg (148 lb 0.5 oz)   12/29/20 78.1 kg (172 lb 3.2 oz)   09/01/20 79.8 kg (176 lb)                    Breast Cancer Screening:    FHS-7: No flowsheet data found.    Patient under 40 years of age: Routine Mammogram Screening not recommended.   Pertinent mammograms are reviewed under the imaging tab.    History of abnormal Pap smear: NO - age 30-65 PAP every 5 years with negative HPV co-testing recommended     Reviewed and updated as needed this visit by clinical staff   Tobacco  Allergies  Meds  Problems  Med Hx  Surg Hx  Fam Hx  Soc   Hx          Reviewed and updated as needed this visit by Provider   Tobacco  Allergies  Meds  Problems  Med Hx  Surg Hx  Fam Hx               Review of Systems   Constitutional: Negative for chills and fever.   HENT: Negative for congestion, ear pain, hearing loss and sore throat.    Eyes: Negative for visual disturbance.   Respiratory: Negative for cough and shortness of breath.    Cardiovascular: Negative for chest pain, palpitations and peripheral edema.   Gastrointestinal: Negative for abdominal pain, constipation, diarrhea, heartburn, hematochezia and nausea.   Breasts:  Negative for tenderness, breast mass and discharge.   Genitourinary: Negative for dysuria, frequency, genital sores, hematuria, pelvic pain, urgency, vaginal bleeding and vaginal discharge.   Musculoskeletal: Negative for  "arthralgias, joint swelling and myalgias.   Skin: Negative for rash.   Neurological: Positive for headaches and paresthesias. Negative for dizziness and weakness.   Psychiatric/Behavioral: Positive for mood changes. The patient is nervous/anxious.           OBJECTIVE:   /76 (BP Location: Right arm, Patient Position: Chair, Cuff Size: Adult Regular)   Pulse 104   Temp 98.7  F (37.1  C) (Oral)   Ht 1.64 m (5' 4.57\")   Wt 67.1 kg (148 lb 0.5 oz)   SpO2 100%   BMI 24.97 kg/m    Physical Exam  GENERAL: healthy, alert and no distress  EYES: Eyes grossly normal to inspection, PERRL and conjunctivae and sclerae normal  HENT: ear canals and TM's normal, nose and mouth without ulcers or lesions  NECK: no adenopathy, no asymmetry, masses, or scars and thyroid normal to palpation  RESP: lungs clear to auscultation - no rales, rhonchi or wheezes  BREAST: normal without masses, tenderness or nipple discharge and no palpable axillary masses or adenopathy  CV: regular rate and rhythm, normal S1 S2, no S3 or S4, no murmur, click or rub, no peripheral edema and peripheral pulses strong  ABDOMEN: soft, nontender, no hepatosplenomegaly, no masses and bowel sounds normal  MS: no gross musculoskeletal defects noted, no edema  SKIN: no suspicious lesions or rashes  NEURO: Normal strength and tone, mentation intact and speech normal  PSYCH: mentation appears normal, affect normal/bright    Diagnostic Test Results:  Labs reviewed in Epic    ASSESSMENT/PLAN:   1. Routine general medical examination at a health care facility  Well adult.    2. Generalized anxiety disorder  Suggest starting lexapro due to significant anxiety issues. Hasn't done well with hydroxyzine in the past. Will give very small amount xanax for severe symptoms. Follow up in one month. Referral placed for therapy as well. I discussed with the patient risks and benefits of the new medication prescribed including potential side effects.  The patient had " "opportunity to ask questions and is comfortable with and interested in medications as prescribed.   - escitalopram (LEXAPRO) 10 MG tablet; Take 1 tablet (10 mg) by mouth daily  Dispense: 60 tablet; Refill: 0  - Adult Mental Health  Referral; Future  - ALPRAZolam (XANAX) 0.5 MG tablet; Take 1 tablet (0.5 mg) by mouth nightly as needed for anxiety  Dispense: 8 tablet; Refill: 0    3. Family history of melanoma  Suggest dermatology skin checks due to moms history of melanoma.  - Adult Dermatology Referral; Future    4. Neck pain  5. Cervicogenic headache  Persistent neck issues. Suggest working with a PT for strengthening, posture. If not improving, could consider XR,  But discussed plan would still be PT initially.   - Physical Therapy Referral; Future    6. Hallux valgus, acquired, unspecified laterality  Discussed treatment options. She wants to hold off on surgery for now.     Patient has been advised of split billing requirements and indicates understanding: Yes    COUNSELING:  Special attention given to:        Regular exercise       Healthy diet/nutrition       Contraception    Estimated body mass index is 24.97 kg/m  as calculated from the following:    Height as of this encounter: 1.64 m (5' 4.57\").    Weight as of this encounter: 67.1 kg (148 lb 0.5 oz).    Weight management plan: Discussed healthy diet and exercise guidelines    She reports that she quit smoking about 15 years ago. She has never used smokeless tobacco.      Counseling Resources:  ATP IV Guidelines  Pooled Cohorts Equation Calculator  Breast Cancer Risk Calculator  BRCA-Related Cancer Risk Assessment: FHS-7 Tool  FRAX Risk Assessment  ICSI Preventive Guidelines  Dietary Guidelines for Americans, 2010  USDA's MyPlate  ASA Prophylaxis  Lung CA Screening    Saadia Adan PA-C  [facility] [place]  Answers for HPI/ROS submitted by the patient on 5/1/2022  If you checked off any problems, how difficult have these " problems made it for you to do your work, take care of things at home, or get along with other people?: Somewhat difficult  PHQ9 TOTAL SCORE: 7  BRETT 7 TOTAL SCORE: 21

## 2022-05-13 ENCOUNTER — THERAPY VISIT (OUTPATIENT)
Dept: PHYSICAL THERAPY | Facility: CLINIC | Age: 37
End: 2022-05-13
Attending: PHYSICIAN ASSISTANT
Payer: COMMERCIAL

## 2022-05-13 DIAGNOSIS — M54.2 NECK PAIN: ICD-10-CM

## 2022-05-13 DIAGNOSIS — G44.86 CERVICOGENIC HEADACHE: ICD-10-CM

## 2022-05-13 PROCEDURE — 97140 MANUAL THERAPY 1/> REGIONS: CPT | Mod: GP

## 2022-05-13 PROCEDURE — 97161 PT EVAL LOW COMPLEX 20 MIN: CPT | Mod: GP

## 2022-05-13 PROCEDURE — 97110 THERAPEUTIC EXERCISES: CPT | Mod: GP

## 2022-05-13 NOTE — PROGRESS NOTES
Physical Therapy Initial Examination/Evaluation  May 13, 2022    Rebecca Fischer is a 36 year old female referred to physical therapy by Saadia Adan PA-C for treatment of Neck and cervicogenic headache with Precautions/Restrictions/MD instructions Eval and treat.     Therapist Impression:   Patient has complaints of chronic neck pain and headaches since high school. She played soccer and states pain dates back to when she dislocated her shoulder and was doing many headers. Back then she did PT for 6 months, over the years have tried chiropractors, muscle relaxants and ibuprofen. Reports the need to crack her neck many times a day otherwise she gets headaches which come 4x/week in tawny horn pattern and last for around 3 hours. Has issues with vacuuming and carrying objects due to pain. Patient found to have impaired seated posture, impaired neck strength and tenderness to palpation. Patient given HEP with suboccipital release, chin tucks, scapular retraction and education on sitting posture with lumbar roll.     Subjective:  DOI/onset: 2003  Acute Injury or Gradual Onset?: Gradual injury over time  Mechanism of Injury: Sport; states back to soccer in HS  Related PMH: Ongoing issues since HS  Imaging: None  Chief Complaint/Functional Limitations:  Neck pain and headaches and see below in therapy evaluation codes   Pain: rest 1 /10, activity 6/10 Location: R sided to R shoulder Frequency: Constant Described as: aching and dull Previous Treatment: Chiro, muscle relaxants, ice, heat/massage, advil Effect of prior treatment: fair Alleviated by: Advil moderately Progression of Symptoms: Unchanged Time of day when pain is worse: Activity related and All times of the day/constant  Sleeping: Interrupted due to current issue   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, now on break) Job duties: prolonged standing, lifting/carrying, pushing/pulling  Current HEP/exercise regimen: Weight lifting avoiding most UE, jogging,  "walking   Patient's goals are see chief complaints \"I want to figure out what I can do, want to stop cracking my neck\"    Other pertinent PMH/Red Flags: Depression   Barriers at home/work: None as reported by patient  Pertinent Surgical History: None  Medications: Anti-depressants  General health as reported by patient: good  Return to MD:  PRN    Cervical Spine Evaluation    Posture  Forward Head: moderate  Rounded Shoulders: mild    Range of Motion   Flexion: WNL  Extension: WNL  Sidebend Left: WNL  Right: WNL  Rotation Left: WNL  Right: WNL  All pain-free, some stretching     Upper extremity screen: WNL     Cervical Strength   Deep neck flexors: 8/40 sec      Upper Extremity Strength  Shoulder MMT Flexion Abduction ER IR   Left 5-/5 5-/5 5/5 5/5   Right 5-/5 5-/5 5/5 5/5       Palpation  Mod tenderness to suboccipitals, paraspinals R > L    Assessment/Plan:  Patient is a 36 year old female with cervical complaints.    Patient has the following significant findings with corresponding treatment plan.                Diagnosis 1:  Neck pain  Pain -  hot/cold therapy, mechanical traction, manual therapy and directional preference exercise  Decreased strength - therapeutic exercise and therapeutic activities  Impaired muscle performance - neuro re-education  Decreased function - therapeutic activities  Impaired posture - neuro re-education    Therapy Evaluation Codes:   1) History comprised of:   Personal factors that impact the plan of care:      Time since onset of symptoms    Comorbidity factors that impact the plan of care are:      Depression.     Medications impacting care: Anti-depressant.  2) Examination of Body Systems comprised of:   Body structures and functions that impact the plan of care:      Cervical spine.   Activity limitations that impact the plan of care are:      Lifting, Sitting, Sleeping and Laying down. Caring for children.   3) Clinical presentation characteristics " are:   Stable/Uncomplicated.  4) Decision-Making    Low complexity using standardized patient assessment instrument and/or measureable assessment of functional outcome.  Cumulative Therapy Evaluation is: Low complexity.    Previous and current functional limitations:  (See Goal Flow Sheet for this information)    Short term and Long term goals: (See Goal Flow Sheet for this information)     Communication ability:  Patient appears to be able to clearly communicate and understand verbal and written communication and follow directions correctly.  Treatment Explanation - The following has been discussed with the patient:   RX ordered/plan of care  Anticipated outcomes  Possible risks and side effects  This patient would benefit from PT intervention to resume normal activities.   Rehab potential is good.    Frequency:  1 X week, once daily  Duration:  for 4 weeks tapering to 2 X a month over 1month  Discharge Plan:  Achieve all LTG.  Independent in home treatment program.  Reach maximal therapeutic benefit.    Please refer to the daily flowsheet for treatment today, total treatment time and time spent performing 1:1 timed codes.

## 2022-05-31 ENCOUNTER — OFFICE VISIT (OUTPATIENT)
Dept: FAMILY MEDICINE | Facility: CLINIC | Age: 37
End: 2022-05-31
Payer: COMMERCIAL

## 2022-05-31 VITALS
TEMPERATURE: 97.5 F | OXYGEN SATURATION: 98 % | HEART RATE: 86 BPM | WEIGHT: 152.01 LBS | SYSTOLIC BLOOD PRESSURE: 130 MMHG | DIASTOLIC BLOOD PRESSURE: 88 MMHG | BODY MASS INDEX: 25.64 KG/M2

## 2022-05-31 DIAGNOSIS — F41.1 GENERALIZED ANXIETY DISORDER: ICD-10-CM

## 2022-05-31 PROBLEM — M54.2 NECK PAIN: Status: RESOLVED | Noted: 2022-05-13 | Resolved: 2022-05-31

## 2022-05-31 PROCEDURE — 99214 OFFICE O/P EST MOD 30 MIN: CPT | Performed by: PHYSICIAN ASSISTANT

## 2022-05-31 PROCEDURE — 96127 BRIEF EMOTIONAL/BEHAV ASSMT: CPT | Performed by: PHYSICIAN ASSISTANT

## 2022-05-31 RX ORDER — ESCITALOPRAM OXALATE 20 MG/1
20 TABLET ORAL DAILY
Qty: 60 TABLET | Refills: 0 | Status: SHIPPED | OUTPATIENT
Start: 2022-05-31 | End: 2022-08-13

## 2022-05-31 RX ORDER — ALPRAZOLAM 0.5 MG
0.5 TABLET ORAL
Qty: 8 TABLET | Refills: 0 | Status: SHIPPED | OUTPATIENT
Start: 2022-05-31 | End: 2022-08-13

## 2022-05-31 ASSESSMENT — PAIN SCALES - GENERAL: PAINLEVEL: NO PAIN (0)

## 2022-05-31 ASSESSMENT — ANXIETY QUESTIONNAIRES
2. NOT BEING ABLE TO STOP OR CONTROL WORRYING: NEARLY EVERY DAY
GAD7 TOTAL SCORE: 14
6. BECOMING EASILY ANNOYED OR IRRITABLE: SEVERAL DAYS
5. BEING SO RESTLESS THAT IT IS HARD TO SIT STILL: SEVERAL DAYS
4. TROUBLE RELAXING: NEARLY EVERY DAY
7. FEELING AFRAID AS IF SOMETHING AWFUL MIGHT HAPPEN: SEVERAL DAYS
GAD7 TOTAL SCORE: 14
GAD7 TOTAL SCORE: 14
8. IF YOU CHECKED OFF ANY PROBLEMS, HOW DIFFICULT HAVE THESE MADE IT FOR YOU TO DO YOUR WORK, TAKE CARE OF THINGS AT HOME, OR GET ALONG WITH OTHER PEOPLE?: SOMEWHAT DIFFICULT
1. FEELING NERVOUS, ANXIOUS, OR ON EDGE: NEARLY EVERY DAY
7. FEELING AFRAID AS IF SOMETHING AWFUL MIGHT HAPPEN: SEVERAL DAYS
3. WORRYING TOO MUCH ABOUT DIFFERENT THINGS: MORE THAN HALF THE DAYS

## 2022-05-31 ASSESSMENT — PATIENT HEALTH QUESTIONNAIRE - PHQ9
SUM OF ALL RESPONSES TO PHQ QUESTIONS 1-9: 6
SUM OF ALL RESPONSES TO PHQ QUESTIONS 1-9: 6
10. IF YOU CHECKED OFF ANY PROBLEMS, HOW DIFFICULT HAVE THESE PROBLEMS MADE IT FOR YOU TO DO YOUR WORK, TAKE CARE OF THINGS AT HOME, OR GET ALONG WITH OTHER PEOPLE: SOMEWHAT DIFFICULT

## 2022-05-31 ASSESSMENT — ENCOUNTER SYMPTOMS: NERVOUS/ANXIOUS: 1

## 2022-05-31 NOTE — PROGRESS NOTES
Assessment & Plan     Generalized anxiety disorder  Will increase lexapro dose to 20 mg to see if she has additional benefit. GAD7 decreased from 21 to 14 - although patient reports not noticing much improvement. Follow up in 4-6 weeks if still noticing symptoms and not feeling effect. If feeling stable, could do GAD7 and PHQ9 and refill. Will refill xanax today - suggest limiting use of this medication as this is not a long term solution. Doing therapy with her . She is amenable to this plan and has no additional questions or concerns today.  - ALPRAZolam (XANAX) 0.5 MG tablet; Take 1 tablet (0.5 mg) by mouth nightly as needed for anxiety  - escitalopram (LEXAPRO) 20 MG tablet; Take 1 tablet (20 mg) by mouth daily        Return in about 4 weeks (around 6/28/2022) for worsening symptoms or failure to improve..    Saadia Adan PA-C  [facility] JEFFRY Fernandez is a 36 year old who presents for the following health issues     Anxiety    History of Present Illness       Mental Health Follow-up:  Patient presents to follow-up on Depression & Anxiety.Patient's depression since last visit has been:  No change  The patient is not having other symptoms associated with depression.  Patient's anxiety since last visit has been:  No change  The patient is not having other symptoms associated with anxiety.  Any significant life events: No  Patient is feeling anxious or having panic attacks.  Patient has no concerns about alcohol or drug use.    She eats 4 or more servings of fruits and vegetables daily.She consumes 1 sweetened beverage(s) daily.She exercises with enough effort to increase her heart rate 30 to 60 minutes per day.  She exercises with enough effort to increase her heart rate 5 days per week.   She is taking medications regularly.    Today's PHQ-9         PHQ-9 Total Score: 6    PHQ-9 Q9 Thoughts of better off dead/self-harm past 2 weeks :   Not at all    How difficult have  these problems made it for you to do your work, take care of things at home, or get along with other people: Somewhat difficult  Today's BRETT-7 Score: 14     PHQ 7/23/2020 5/1/2022 5/31/2022   PHQ-9 Total Score 7 7 6   Q9: Thoughts of better off dead/self-harm past 2 weeks Not at all Not at all Not at all     BRETT-7 SCORE 7/23/2020 5/1/2022 5/31/2022   Total Score - 21 (severe anxiety) 14 (moderate anxiety)   Total Score 7 21 14       Patient reports no significant improvement. Has taken xanax a few times - this helps with sleep. Knows xanax is not a long term solution.         Review of Systems   Psychiatric/Behavioral: The patient is nervous/anxious.             Objective    /88 (BP Location: Right arm, Patient Position: Chair, Cuff Size: Adult Regular)   Pulse 86   Temp 97.5  F (36.4  C) (Oral)   Wt 69 kg (152 lb 0.2 oz)   SpO2 98%   BMI 25.64 kg/m    Body mass index is 25.64 kg/m .  Physical Exam   GENERAL: healthy, alert and no distress  PSYCH: mentation appears normal, affect normal/bright

## 2022-06-08 ENCOUNTER — LAB REQUISITION (OUTPATIENT)
Dept: LAB | Facility: CLINIC | Age: 37
End: 2022-06-08

## 2022-06-08 PROCEDURE — 86481 TB AG RESPONSE T-CELL SUSP: CPT | Performed by: INTERNAL MEDICINE

## 2022-06-09 LAB
GAMMA INTERFERON BACKGROUND BLD IA-ACNC: 0.01 IU/ML
M TB IFN-G BLD-IMP: NEGATIVE
M TB IFN-G CD4+ BCKGRND COR BLD-ACNC: 9.99 IU/ML
MITOGEN IGNF BCKGRD COR BLD-ACNC: 0 IU/ML
MITOGEN IGNF BCKGRD COR BLD-ACNC: 0.02 IU/ML
QUANTIFERON MITOGEN: 10 IU/ML
QUANTIFERON NIL TUBE: 0.01 IU/ML
QUANTIFERON TB1 TUBE: 0.01 IU/ML
QUANTIFERON TB2 TUBE: 0.03

## 2022-06-17 NOTE — PROGRESS NOTES
38w0d  No complaints.  Baby is moving well.  Has not set up appt yet for depression management, but plans to do so.   Reviewed IOL.  Cervix check today.  Plan IOL if cervix favorable at 39-40 wks.  RR     Labs/EKG/Imaging Studies

## 2022-07-07 PROBLEM — G44.86 CERVICOGENIC HEADACHE: Status: RESOLVED | Noted: 2022-05-13 | Resolved: 2022-07-07

## 2022-07-26 ENCOUNTER — VIRTUAL VISIT (OUTPATIENT)
Dept: FAMILY MEDICINE | Facility: CLINIC | Age: 37
End: 2022-07-26
Payer: COMMERCIAL

## 2022-07-26 DIAGNOSIS — F41.1 GENERALIZED ANXIETY DISORDER: Primary | ICD-10-CM

## 2022-07-26 PROCEDURE — 99214 OFFICE O/P EST MOD 30 MIN: CPT | Mod: GT | Performed by: PHYSICIAN ASSISTANT

## 2022-07-26 RX ORDER — SERTRALINE HYDROCHLORIDE 25 MG/1
TABLET, FILM COATED ORAL
Qty: 106 TABLET | Refills: 0 | Status: SHIPPED | OUTPATIENT
Start: 2022-07-26 | End: 2023-01-10 | Stop reason: SINTOL

## 2022-07-26 RX ORDER — HYDROXYZINE HYDROCHLORIDE 25 MG/1
25-50 TABLET, FILM COATED ORAL 3 TIMES DAILY PRN
Qty: 60 TABLET | Refills: 0 | Status: SHIPPED | OUTPATIENT
Start: 2022-07-26 | End: 2022-09-07

## 2022-07-26 ASSESSMENT — ANXIETY QUESTIONNAIRES
3. WORRYING TOO MUCH ABOUT DIFFERENT THINGS: MORE THAN HALF THE DAYS
IF YOU CHECKED OFF ANY PROBLEMS ON THIS QUESTIONNAIRE, HOW DIFFICULT HAVE THESE PROBLEMS MADE IT FOR YOU TO DO YOUR WORK, TAKE CARE OF THINGS AT HOME, OR GET ALONG WITH OTHER PEOPLE: VERY DIFFICULT
2. NOT BEING ABLE TO STOP OR CONTROL WORRYING: NEARLY EVERY DAY
7. FEELING AFRAID AS IF SOMETHING AWFUL MIGHT HAPPEN: MORE THAN HALF THE DAYS
1. FEELING NERVOUS, ANXIOUS, OR ON EDGE: NEARLY EVERY DAY
5. BEING SO RESTLESS THAT IT IS HARD TO SIT STILL: MORE THAN HALF THE DAYS
GAD7 TOTAL SCORE: 17
GAD7 TOTAL SCORE: 17
6. BECOMING EASILY ANNOYED OR IRRITABLE: MORE THAN HALF THE DAYS

## 2022-07-26 ASSESSMENT — PATIENT HEALTH QUESTIONNAIRE - PHQ9
5. POOR APPETITE OR OVEREATING: NEARLY EVERY DAY
SUM OF ALL RESPONSES TO PHQ QUESTIONS 1-9: 12

## 2022-07-26 NOTE — PATIENT INSTRUCTIONS
Crisis Text Line  http://www.crisistextline.org     The Crisis Text Line serves anyone, in any type of crisis, providing access to free, 24/7 support and information via the medium people already use and trust:     Here's how it works:  Text 761-477 from anywhere in the [place], anytime, about any type of crisis.  A live, trained Crisis Counselor receives the text and responds quickly.  The volunteer Crisis Counselor will help you move from a 'hot moment to a cool moment'

## 2022-07-26 NOTE — PROGRESS NOTES
Rebecca is a 36 year old who is being evaluated via a billable video visit.      How would you like to obtain your AVS? MyChart  If the video visit is dropped, the invitation should be resent by: Text to cell phone: [phone]  Will anyone else be joining your video visit? No          Assessment & Plan     Generalized anxiety disorder  Persistent anxiety. STOP lexapro. START sertraline. Also start hydroxyzine as needed. No further questions. Follow up in 4-6 weeks.   - sertraline (ZOLOFT) 25 MG tablet; Take 1 tablet (25 mg) by mouth daily for 14 days, THEN 2 tablets (50 mg) daily for 46 days.  - hydrOXYzine (ATARAX) 25 MG tablet; Take 1-2 tablets (25-50 mg) by mouth 3 times daily as needed for anxiety             Depression Screening Follow Up    PHQ 7/26/2022   PHQ-9 Total Score 12   Q9: Thoughts of better off dead/self-harm past 2 weeks Not at all         Follow Up Actions Taken  Crisis resource information provided in After Visit Summary         Return in about 5 weeks (around 8/30/2022) for using a video visit.    Saadia Adan PA-C  [facility]   Rebecca is a 36 year old, presenting for the following health issues:  No chief complaint on file.      HPI     Depression and Anxiety Follow-Up    How are you doing with your depression since your last visit? No change    How are you doing with your anxiety since your last visit?  No change    Are you having other symptoms that might be associated with depression or anxiety? No    Have you had a significant life event? OTHER: new job and  is hospitalized     Do you have any concerns with your use of alcohol or other drugs? No    Social History     Tobacco Use     Smoking status: Former Smoker     Quit date: 12/5/2006     Years since quitting: 15.6     Smokeless tobacco: Never Used   Substance Use Topics     Alcohol use: Yes     Drug use: Never     PHQ 5/1/2022 5/31/2022 7/26/2022   PHQ-9 Total Score 7 6 12   Q9: Thoughts  of better off dead/self-harm past 2 weeks Not at all Not at all Not at all     BRETT-7 SCORE 5/1/2022 5/31/2022 7/26/2022   Total Score 21 (severe anxiety) 14 (moderate anxiety) -   Total Score 21 14 17     Last PHQ-9 7/26/2022   1.  Little interest or pleasure in doing things 1   2.  Feeling down, depressed, or hopeless 2   3.  Trouble falling or staying asleep, or sleeping too much 3   4.  Feeling tired or having little energy 2   5.  Poor appetite or overeating 1   6.  Feeling bad about yourself 0   7.  Trouble concentrating 2   8.  Moving slowly or restless 1   Q9: Thoughts of better off dead/self-harm past 2 weeks 0   PHQ-9 Total Score 12   Difficulty at work, home, or with people Very difficult     BRETT-7  7/26/2022   1. Feeling nervous, anxious, or on edge 3   2. Not being able to stop or control worrying 3   3. Worrying too much about different things 2   4. Trouble relaxing 3   5. Being so restless that it is hard to sit still 2   6. Becoming easily annoyed or irritable 2   7. Feeling afraid, as if something awful might happen 2   BRETT-7 Total Score 17   If you checked any problems, how difficult have they made it for you to do your work, take care of things at home, or get along with other people? Very difficult       Suicide Assessment Five-step Evaluation and Treatment (SAFE-T)      How many servings of fruits and vegetables do you eat daily?  4 or more    On average, how many sweetened beverages do you drink each day (Examples: soda, juice, sweet tea, etc.  Do NOT count diet or artificially sweetened beverages)?   0    How many days per week do you exercise enough to make your heart beat faster? 3 or less    How many minutes a day do you exercise enough to make your heart beat faster? 30 - 60  How many days per week do you miss taking your medication? Occasionally    What makes it hard for you to take your medications?  remembering to take      Not improved on the Lexapro. Still having significant anxiety.     recently needing surgery. Still anxious. Issues falling asleep at night.           Review of Systems   Constitutional, HEENT, cardiovascular, pulmonary, gi and gu systems are negative, except as otherwise noted.      Objective           Vitals:  No vitals were obtained today due to virtual visit.    Physical Exam   GENERAL: Healthy, alert and no distress  EYES: Eyes grossly normal to inspection.  No discharge or erythema, or obvious scleral/conjunctival abnormalities.  RESP: No audible wheeze, cough, or visible cyanosis.  No visible retractions or increased work of breathing.    SKIN: Visible skin clear. No significant rash, abnormal pigmentation or lesions.  NEURO: Cranial nerves grossly intact.  Mentation and speech appropriate for age.  PSYCH: Mentation appears normal, affect normal/bright, judgement and insight intact, normal speech and appearance well-groomed.                Video-Visit Details    Video Start Time: 5:19 PM    Type of service:  Video Visit    Video End Time:5:39 PM    Originating Location (pt. Location): Home    Distant Location (provider location):  [facility]     Platform used for Video Visit: Shellcatch  Jeanine

## 2022-07-26 NOTE — COMMUNITY RESOURCES LIST (ENGLISH)
07/26/2022   [facility] - Outpatient Clinics  N/A  For questions about this resource list or additional care needs, please contact your primary care clinic or care manager.  Phone: [phone]   Email: N/A   Address: [facility] [place] [address]   Hours: N/A        Hotlines and Helplines       Hotline - Crisis help  1  [facility] Distance: 1.73 miles      COVID-19 Status: Phone/Virtual   [address] [facility] [address] [place] [address]  Language: English, Gabonese, Filipino  Hours: Mon - Sun 10:00 AM - 10:00 PM   Phone: (328) 275-8560 Email: donita@[employer].org Website: http://[employer].org/     2  [facility] - Emergency  Communications Distance: 4.99 miles      COVID-19 Status: Phone/Virtual   [address] [place] [address]  Language: English, Mozambican  Hours: Mon - Sun Open 24 Hours   Phone: (840) 598-1725 Email: [email or web address].org Website: https://www.[employer]io.org/local/mn-nd-sd/about-us/locations/[facility].html          Mental Health       Individual counseling  3  [facility] - [facility] Distance: 1.56 miles      COVID-19 Status: Phone/Virtual   [address] [place] [address]  Language: English  Hours: Mon - Fri 9:00 AM - 5:00 PM Appt. Only  Fees: Insurance, Self Pay   Phone: (571) 330-8386 Email: [email or web address]@[email or web address] Website: https://www.agreement24 [email or web address]/     4  [facility] Distance: 1.62 miles      COVID-19 Status: Regular Operations   [address] [place] [address]  Language: English  Hours: Mon - Fri 7:00 AM - 6:00 PM  Fees: Insurance, Self Pay   Phone: (534) 384-6526 Website: [email or web address].org/locations/[facility]     Mental health crisis care  5  [facility] Distance: 4.96 miles      COVID-19 Status: Regular Operations, COVID-19 Status:  Phone/Virtual   [address] [facility] [address] [place] [address]  Language: English, Thai  Hours: Mon - Fri 9:00 AM - 5:00 PM  Fees: Insurance, Self Pay   Phone: (312) 104-5094 Website: [email or web address])/index.[email or web address]     6  [facility] Distance: 5.62 miles      COVID-19 Status: Regular Operations   [address] [place] [address]  Language: English  Hours: Mon - Sun Open 24 Hours  Fees: Insurance, Self Pay, Sliding Fee   Phone: (846) 255-8609 Website: [email or web address]://www.[facility].org/specialty/psychiatry/acute-psychiatry-services/     Mental health support group  7  [facility] Distance: 5.36 miles      COVID-19 Status: Regular Operations   [address] [place] [address]  Language: English  Hours: Tue 7:30 PM - 9:30 PM  Fees: Free   Phone: (240) 553-5833 Email: [email or web address] Network Website: http://www.[email or web address].[email or web address]/multiples     8  [facility] Distance: 5.45 miles      COVID-19 Status: Phone/Virtual   [address] [place] [address]  Language: English  Hours: Mon - Fri 9:00 AM - 3:00 PM  Fees: Free   Phone: (621) 544-6447 Website: [email or web address].[email or web address].org/          Important Numbers & Websites       Emergency Services   [address]  [place] [address]  Poison Control   (512) 685-8094  Suicide Prevention Lifeline   (411) 220-9874 (TALK)  Child Abuse Hotline   (928) 943-6291 (4-A-Child)  Sexual Assault Hotline   (804) 904-7180 (HOPE)  National Runaway Safeline   (990) 190-1039 (RUNAWAY)  All-Options Talkline   (622) 683-2490  Substance Abuse Referral   (998) 656-1413 (HELP)

## 2022-07-27 ENCOUNTER — THERAPY VISIT (OUTPATIENT)
Dept: PHYSICAL THERAPY | Facility: CLINIC | Age: 37
End: 2022-07-27
Payer: COMMERCIAL

## 2022-07-27 DIAGNOSIS — G44.86 CERVICOGENIC HEADACHE: ICD-10-CM

## 2022-07-27 DIAGNOSIS — M54.2 NECK PAIN: Primary | ICD-10-CM

## 2022-07-27 PROCEDURE — 97140 MANUAL THERAPY 1/> REGIONS: CPT | Mod: GP | Performed by: PHYSICAL THERAPIST

## 2022-07-27 PROCEDURE — 97112 NEUROMUSCULAR REEDUCATION: CPT | Mod: GP | Performed by: PHYSICAL THERAPIST

## 2022-07-27 PROCEDURE — 97110 THERAPEUTIC EXERCISES: CPT | Mod: GP | Performed by: PHYSICAL THERAPIST

## 2022-07-28 NOTE — PROGRESS NOTES
Subjective:  HPI  Physical Exam                    Objective:  System    Physical Exam    General     ROS    Assessment/Plan:    PROGRESS  REPORT    Progress reporting period is from 5/13/2022 to 7/27/2022.  Patient has completed 2 PT visits.       SUBJECTIVE  Subjective: Patient returns to clinic after a 2+ month absence from PT reporting her symptoms remain the same. Current symptoms include R sided cervical/UT muscle tension/pain, HA frequency and durations aslo remain 3-4x/week lasting for 3 hours. Has been trying to get back into UE weight lifting with her , but notes increased symptoms in R UT/cerv area.    Current Pain level: 3/10 (up to 6/10).     Initial Pain level: 6/10 (At worst).   Changes in function:  None  Adverse reaction to treatment or activity: None    OBJECTIVE    Objective: Sitting posture in clinic noted to be fair. AROM of cervical spine WNL, +pain and tension at end range of R SB and R rotation. Cervical myotomes remain grossly 5/5. Reduced P/A mobility of lower cervical spinal segments. Positive response in clinic to MDT principles applied in the sagittal plane. Positive response as well to SOR by PT. NDI remains 16%     ASSESSMENT/PLAN  Updated problem list and treatment plan: Diagnosis 1:  Neck/HA pain  Pain -  manual therapy, self management, education, directional preference exercise and home program  Decreased ROM/flexibility - manual therapy, therapeutic exercise, therapeutic activity and home program  Decreased joint mobility - manual therapy, therapeutic exercise, therapeutic activity and home program  Decreased function - therapeutic activities and home program  Impaired posture - neuro re-education, therapeutic activities and home program  STG/LTGs have been met or progress has been made towards goals:  None  Assessment of Progress: The patient's condition has potential to improve.  Self Management Plans:  Patient has been instructed in a home treatment program.  Patient   has been instructed in self management of symptoms.  I have re-evaluated this patient and find that the nature, scope, duration and intensity of the therapy is appropriate for the medical condition of the patient.  Rebecca continues to require the following intervention to meet STG and LTG's:  PT    Recommendations:  This patient would benefit from continued therapy.     Frequency:  1 X week, once daily  Duration:  for 2 weeks tapering to 1 X a week, every other week, over 4-6 weeks(4 additional visits remain on current PT plan)        Please refer to the daily flowsheet for treatment today, total treatment time and time spent performing 1:1 timed codes.

## 2022-08-13 ENCOUNTER — MYC REFILL (OUTPATIENT)
Dept: FAMILY MEDICINE | Facility: CLINIC | Age: 37
End: 2022-08-13

## 2022-08-13 DIAGNOSIS — F41.1 GENERALIZED ANXIETY DISORDER: ICD-10-CM

## 2022-08-15 NOTE — TELEPHONE ENCOUNTER
Controlled Substance Refill Request for ALPRAZolam (XANAX) 0.5 MG tablet   Problem List Complete:  Yes   checked in past 3 months?  No, route to RN

## 2022-08-15 NOTE — TELEPHONE ENCOUNTER
Please clarify with patient. Last office visit note with Saadia indicates patient was stopping Lexapro and starting sertraline.   Joanne Ortega PA-C

## 2022-08-17 NOTE — TELEPHONE ENCOUNTER
Patient hasn't read [employer] message. Please call her to ask below questions.     Saadia Adan PA-C

## 2022-08-19 RX ORDER — ESCITALOPRAM OXALATE 20 MG/1
20 TABLET ORAL DAILY
Qty: 60 TABLET | Refills: 0 | Status: SHIPPED | OUTPATIENT
Start: 2022-08-19 | End: 2022-10-24

## 2022-08-19 RX ORDER — ALPRAZOLAM 0.5 MG
0.5 TABLET ORAL
Qty: 8 TABLET | Refills: 0 | Status: SHIPPED | OUTPATIENT
Start: 2022-08-19 | End: 2022-09-29

## 2022-09-03 ENCOUNTER — HEALTH MAINTENANCE LETTER (OUTPATIENT)
Age: 37
End: 2022-09-03

## 2022-09-05 DIAGNOSIS — F41.1 GENERALIZED ANXIETY DISORDER: ICD-10-CM

## 2022-09-07 NOTE — TELEPHONE ENCOUNTER
Routing refill request to provider for review/approval because:  Failed Protocol  Drug Warning    Georgina Diaz RN BSN  [facility]

## 2022-09-08 RX ORDER — HYDROXYZINE HYDROCHLORIDE 25 MG/1
25-50 TABLET, FILM COATED ORAL 3 TIMES DAILY PRN
Qty: 60 TABLET | Refills: 0 | Status: SHIPPED | OUTPATIENT
Start: 2022-09-08 | End: 2022-09-29

## 2022-09-29 ENCOUNTER — NURSE TRIAGE (OUTPATIENT)
Dept: FAMILY MEDICINE | Facility: CLINIC | Age: 37
End: 2022-09-29

## 2022-09-29 NOTE — TELEPHONE ENCOUNTER
Reason for Call:  Medication or medication refill:    Do you use a [facility]?  Name of the pharmacy and phone number for the current request:  [facility] [address] [place] [address] (Ph: [phone]    Name of the medication requested: some type of muscle relaxer    Other request: would like to also have an MRI done    Can we leave a detailed message on this number? YES    Phone number patient can be reached at: Home number on file [phone] (home)    Best Time: any    Call taken on 9/29/2022 at 9:13 AM by Joanne Elam

## 2022-09-30 NOTE — TELEPHONE ENCOUNTER
Spoke with pt. States her headaches were discussed at her visit in May. Over the past 2 weeks headaches have been daily. Headaches are constant. Advil and Tylenol help somewhat. Rates pain 5/10. No nausea or vomiting. No other symptoms. Has tried heat and ice and lidocaine otc. Has been doing PT exercises and this has not helped. Appt scheduled.    Ivis Dinh RN  [facility]      Reason for Disposition    Headache is a chronic symptom (recurrent or ongoing AND lasting > 4 weeks)    Additional Information    Negative: Difficult to awaken or acting confused (e.g., disoriented, slurred speech)    Negative: Weakness of the face, arm or leg on one side of the body and new-onset    Negative: Numbness of the face, arm or leg on one side of the body and new-onset    Negative: Loss of speech or garbled speech and new-onset    Negative: Passed out (i.e., fainted, collapsed and was not responding)    Negative: Sounds like a life-threatening emergency to the triager    Negative: Followed a head injury within last 3 days    Negative: Traumatic Brain Injury (TBI) is suspected    Negative: Sinus pain of forehead and yellow or green nasal discharge    Negative: Pregnant    Negative: Unable to walk without falling    Negative: Stiff neck (can't touch chin to chest)    Negative: Possibility of carbon monoxide exposure    Negative: SEVERE headache, states 'worst headache' of life    Negative: SEVERE headache, sudden-onset (i.e., reaching maximum intensity within seconds to 1 hour)    Negative: Severe pain in one eye    Negative: Loss of vision or double vision  (Exception: Same as prior migraines.)    Negative: Patient sounds very sick or weak to the triager    Negative: Fever > 103 F (39.4 C)    Negative: Fever > 100.0 F (37.8 C) and has diabetes mellitus or a weak immune system (e.g., HIV positive, cancer chemotherapy, organ transplant, splenectomy, chronic steroids)    Negative: Headache started during  exertion (e.g., sex, strenuous exercise, heavy lifting)    Negative: Unexplained headache that is present > 24 hours    Negative: New headache and age > 50    Negative: New headache and weak immune system (e.g., HIV positive, cancer chemo, splenectomy, organ transplant, chronic steroids)    Negative: Fever present > 3 days (72 hours)    Negative: Patient wants to be seen    Negative: SEVERE headache (e.g., excruciating) and has had severe headaches before    Negative: SEVERE headache and not relieved by pain meds    Negative: SEVERE headache and vomiting    Negative: SEVERE headache and fever    Protocols used: HEADACHE-A-OH

## 2022-10-04 PROBLEM — O35.8XX0 MATERNAL CARE FOR OTHER (SUSPECTED) FETAL ABNORMALITY AND DAMAGE, NOT APPLICABLE OR UNSPECIFIED: Status: RESOLVED | Noted: 2020-02-26 | Resolved: 2022-05-31

## 2022-10-11 ENCOUNTER — TELEPHONE (OUTPATIENT)
Dept: PSYCHOLOGY | Facility: CLINIC | Age: 37
End: 2022-10-11

## 2022-10-11 NOTE — TELEPHONE ENCOUNTER
Patient did not arrive for the [facility] visit as of 8:22Am. Provider called patient and left two messages to call my office number, to reschedule an appointment with me. I also let the patient know they can call [facility] as well to reschedule. Provider will reach out to patient on Wednesday, 10/12 to check in with the patient

## 2022-10-24 DIAGNOSIS — F41.1 GENERALIZED ANXIETY DISORDER: ICD-10-CM

## 2022-10-24 RX ORDER — ESCITALOPRAM OXALATE 20 MG/1
TABLET ORAL
Qty: 60 TABLET | Refills: 0 | Status: SHIPPED | OUTPATIENT
Start: 2022-10-24 | End: 2022-11-21

## 2022-11-21 ENCOUNTER — MYC REFILL (OUTPATIENT)
Dept: FAMILY MEDICINE | Facility: CLINIC | Age: 37
End: 2022-11-21

## 2022-11-21 DIAGNOSIS — F41.1 GENERALIZED ANXIETY DISORDER: ICD-10-CM

## 2022-11-22 RX ORDER — HYDROXYZINE HYDROCHLORIDE 25 MG/1
25-50 TABLET, FILM COATED ORAL 3 TIMES DAILY PRN
Qty: 60 TABLET | Refills: 0 | Status: SHIPPED | OUTPATIENT
Start: 2022-11-22 | End: 2022-12-29

## 2022-11-22 RX ORDER — ALPRAZOLAM 0.5 MG
0.5 TABLET ORAL
Qty: 8 TABLET | Refills: 0 | Status: SHIPPED | OUTPATIENT
Start: 2022-11-22 | End: 2022-12-29

## 2022-11-22 RX ORDER — ESCITALOPRAM OXALATE 20 MG/1
20 TABLET ORAL DAILY
Qty: 60 TABLET | Refills: 0 | Status: SHIPPED | OUTPATIENT
Start: 2022-11-22 | End: 2022-12-08

## 2022-12-05 ASSESSMENT — ANXIETY QUESTIONNAIRES
2. NOT BEING ABLE TO STOP OR CONTROL WORRYING: NEARLY EVERY DAY
IF YOU CHECKED OFF ANY PROBLEMS ON THIS QUESTIONNAIRE, HOW DIFFICULT HAVE THESE PROBLEMS MADE IT FOR YOU TO DO YOUR WORK, TAKE CARE OF THINGS AT HOME, OR GET ALONG WITH OTHER PEOPLE: VERY DIFFICULT
7. FEELING AFRAID AS IF SOMETHING AWFUL MIGHT HAPPEN: NEARLY EVERY DAY
GAD7 TOTAL SCORE: 21
6. BECOMING EASILY ANNOYED OR IRRITABLE: NEARLY EVERY DAY
5. BEING SO RESTLESS THAT IT IS HARD TO SIT STILL: NEARLY EVERY DAY
GAD7 TOTAL SCORE: 21
4. TROUBLE RELAXING: NEARLY EVERY DAY
8. IF YOU CHECKED OFF ANY PROBLEMS, HOW DIFFICULT HAVE THESE MADE IT FOR YOU TO DO YOUR WORK, TAKE CARE OF THINGS AT HOME, OR GET ALONG WITH OTHER PEOPLE?: VERY DIFFICULT
1. FEELING NERVOUS, ANXIOUS, OR ON EDGE: NEARLY EVERY DAY
7. FEELING AFRAID AS IF SOMETHING AWFUL MIGHT HAPPEN: NEARLY EVERY DAY
GAD7 TOTAL SCORE: 21
3. WORRYING TOO MUCH ABOUT DIFFERENT THINGS: NEARLY EVERY DAY

## 2022-12-05 ASSESSMENT — PATIENT HEALTH QUESTIONNAIRE - PHQ9
10. IF YOU CHECKED OFF ANY PROBLEMS, HOW DIFFICULT HAVE THESE PROBLEMS MADE IT FOR YOU TO DO YOUR WORK, TAKE CARE OF THINGS AT HOME, OR GET ALONG WITH OTHER PEOPLE: SOMEWHAT DIFFICULT
SUM OF ALL RESPONSES TO PHQ QUESTIONS 1-9: 17
SUM OF ALL RESPONSES TO PHQ QUESTIONS 1-9: 17

## 2022-12-08 ENCOUNTER — VIRTUAL VISIT (OUTPATIENT)
Dept: FAMILY MEDICINE | Facility: CLINIC | Age: 37
End: 2022-12-08
Payer: COMMERCIAL

## 2022-12-08 DIAGNOSIS — R63.5 WEIGHT GAIN: ICD-10-CM

## 2022-12-08 DIAGNOSIS — F33.1 MAJOR DEPRESSIVE DISORDER, RECURRENT EPISODE, MODERATE (H): ICD-10-CM

## 2022-12-08 DIAGNOSIS — F41.1 GENERALIZED ANXIETY DISORDER: Primary | ICD-10-CM

## 2022-12-08 PROCEDURE — 99214 OFFICE O/P EST MOD 30 MIN: CPT | Mod: 95 | Performed by: PHYSICIAN ASSISTANT

## 2022-12-08 ASSESSMENT — PATIENT HEALTH QUESTIONNAIRE - PHQ9
SUM OF ALL RESPONSES TO PHQ QUESTIONS 1-9: 17
10. IF YOU CHECKED OFF ANY PROBLEMS, HOW DIFFICULT HAVE THESE PROBLEMS MADE IT FOR YOU TO DO YOUR WORK, TAKE CARE OF THINGS AT HOME, OR GET ALONG WITH OTHER PEOPLE: SOMEWHAT DIFFICULT

## 2022-12-08 ASSESSMENT — ANXIETY QUESTIONNAIRES: GAD7 TOTAL SCORE: 21

## 2022-12-08 NOTE — PROGRESS NOTES
Rebecca is a 37 year old who is being evaluated via a billable video visit.      How would you like to obtain your AVS? MyChart  If the video visit is dropped, the invitation should be resent by: Text to cell phone: [phone]  Will anyone else be joining your video visit? No          Assessment & Plan     Generalized anxiety disorder  Major depressive disorder, recurrent episode, moderate (H)  Not at goal. Both GAD7 and PHQ9 have increased since last visit in July. We'll look into weight change. Suggest stopping lexapro and restarting sertraline. She has this medication at home so will use this. Recheck in 6 weeks.     Weight gain  Pretty significant weight gain. Suggest labs to rule out underlying causes of weight change. Follow up in clinic in 6 weeks.   - CBC with platelets; Future  - Comprehensive metabolic panel (BMP + Alb, Alk Phos, ALT, AST, Total. Bili, TP); Future  - Ferritin; Future  - Iron and iron binding capacity; Future  - Vitamin B12; Future  - Folate; Future  - TSH; Future  - T4, free; Future             Depression Screening Follow Up    PHQ 12/5/2022   PHQ-9 Total Score 17   Q9: Thoughts of better off dead/self-harm past 2 weeks Not at all     Last PHQ-9 12/5/2022   1.  Little interest or pleasure in doing things 2   2.  Feeling down, depressed, or hopeless 3   3.  Trouble falling or staying asleep, or sleeping too much 3   4.  Feeling tired or having little energy 3   5.  Poor appetite or overeating 2   6.  Feeling bad about yourself 2   7.  Trouble concentrating 2   8.  Moving slowly or restless 0   Q9: Thoughts of better off dead/self-harm past 2 weeks 0   PHQ-9 Total Score 17   Difficulty at work, home, or with people -       Follow Up Actions Taken  Follow up recommended: 6 weeks         Return in about 6 weeks (around 1/19/2023) for Follow up.    [facility]   Rebecca is a 37 year old, presenting for the following health  issues:  Depression, Anxiety, and Medication Problem (Escitalopram- weight gain)      History of Present Illness       Mental Health Follow-up:  Patient presents to follow-up on Depression & Anxiety.Patient's depression since last visit has been:  Medium  The patient is having other symptoms associated with depression.  Patient's anxiety since last visit has been:  Worse  The patient is having other symptoms associated with anxiety.  Any significant life events: No  Patient is feeling anxious or having panic attacks.  Patient has no concerns about alcohol or drug use.    She eats 2-3 servings of fruits and vegetables daily.She consumes 1 sweetened beverage(s) daily.She exercises with enough effort to increase her heart rate 20 to 29 minutes per day.  She exercises with enough effort to increase her heart rate 3 or less days per week. She is missing 1 dose(s) of medications per week.  She is not taking prescribed medications regularly due to other.    Today's PHQ-9         PHQ-9 Total Score: 17    PHQ-9 Q9 Thoughts of better off dead/self-harm past 2 weeks :   Not at all    How difficult have these problems made it for you to do your work, take care of things at home, or get along with other people: Somewhat difficult  Today's BRETT-7 Score: 21     BRETT-7 SCORE 5/31/2022 7/26/2022 12/5/2022   Total Score 14 (moderate anxiety) - 21 (severe anxiety)   Total Score 14 17 21       PHQ 5/31/2022 7/26/2022 12/5/2022   PHQ-9 Total Score 6 12 17   Q9: Thoughts of better off dead/self-harm past 2 weeks Not at all Not at all Not at all     Patient reports weight is currently around 190 lbs. She was 152 in clinic in May 2022. Feels symptoms are unchanged on lexapro. Lexapro was started May 2022 - in July 2022 she reported minimal improvement so we discussed switching to Zoloft. She decided to stick with Lexapro as she felt is was maybe working.     Still anxious, depressive symptoms increased.               Review of Systems    Constitutional, HEENT, cardiovascular, pulmonary, gi and gu systems are negative, except as otherwise noted.      Objective           Vitals:  No vitals were obtained today due to virtual visit.    Physical Exam   GENERAL: Healthy, alert and no distress  EYES: Eyes grossly normal to inspection.  No discharge or erythema, or obvious scleral/conjunctival abnormalities.  RESP: No audible wheeze, cough, or visible cyanosis.  No visible retractions or increased work of breathing.    SKIN: Visible skin clear. No significant rash, abnormal pigmentation or lesions.  NEURO: Cranial nerves grossly intact.  Mentation and speech appropriate for age.  PSYCH: Mentation appears normal, affect normal/bright, judgement and insight intact, normal speech and appearance well-groomed.                Video-Visit Details    Video Start Time: 10:23 AM    Type of service:  Video Visit    Video End Time:10:33 AM    Originating Location (pt. Location): Home        Distant Location (provider location):  On-site    Platform used for Video Visit: Access Point

## 2022-12-09 ENCOUNTER — LAB (OUTPATIENT)
Dept: LAB | Facility: CLINIC | Age: 37
End: 2022-12-09
Payer: COMMERCIAL

## 2022-12-09 DIAGNOSIS — R63.5 WEIGHT GAIN: ICD-10-CM

## 2022-12-09 DIAGNOSIS — Z11.59 NEED FOR HEPATITIS C SCREENING TEST: Primary | ICD-10-CM

## 2022-12-09 LAB
ALBUMIN SERPL BCG-MCNC: 4.7 G/DL (ref 3.5–5.2)
ALP SERPL-CCNC: 42 U/L (ref 35–104)
ALT SERPL W P-5'-P-CCNC: 16 U/L (ref 10–35)
ANION GAP SERPL CALCULATED.3IONS-SCNC: 9 MMOL/L (ref 7–15)
AST SERPL W P-5'-P-CCNC: 20 U/L (ref 10–35)
BILIRUB SERPL-MCNC: 0.8 MG/DL
BUN SERPL-MCNC: 14.1 MG/DL (ref 6–20)
CALCIUM SERPL-MCNC: 9.1 MG/DL (ref 8.6–10)
CHLORIDE SERPL-SCNC: 101 MMOL/L (ref 98–107)
CREAT SERPL-MCNC: 0.95 MG/DL (ref 0.51–0.95)
DEPRECATED HCO3 PLAS-SCNC: 25 MMOL/L (ref 22–29)
ERYTHROCYTE [DISTWIDTH] IN BLOOD BY AUTOMATED COUNT: 12.4 % (ref 10–15)
FERRITIN SERPL-MCNC: 147 NG/ML (ref 6–175)
FOLATE SERPL-MCNC: 14.5 NG/ML (ref 4.6–34.8)
GFR SERPL CREATININE-BSD FRML MDRD: 80 ML/MIN/1.73M2
GLUCOSE SERPL-MCNC: 96 MG/DL (ref 70–99)
HCT VFR BLD AUTO: 35.8 % (ref 35–47)
HGB BLD-MCNC: 11.7 G/DL (ref 11.7–15.7)
IRON BINDING CAPACITY (ROCHE): 350 UG/DL (ref 240–430)
IRON SATN MFR SERPL: 24 % (ref 15–46)
IRON SERPL-MCNC: 84 UG/DL (ref 37–145)
MCH RBC QN AUTO: 29.5 PG (ref 26.5–33)
MCHC RBC AUTO-ENTMCNC: 32.7 G/DL (ref 31.5–36.5)
MCV RBC AUTO: 90 FL (ref 78–100)
PLATELET # BLD AUTO: 247 10E3/UL (ref 150–450)
POTASSIUM SERPL-SCNC: 4.3 MMOL/L (ref 3.4–5.3)
PROT SERPL-MCNC: 7.1 G/DL (ref 6.4–8.3)
RBC # BLD AUTO: 3.97 10E6/UL (ref 3.8–5.2)
SODIUM SERPL-SCNC: 135 MMOL/L (ref 136–145)
T4 FREE SERPL-MCNC: 1.04 NG/DL (ref 0.9–1.7)
TSH SERPL DL<=0.005 MIU/L-ACNC: 1.58 UIU/ML (ref 0.3–4.2)
VIT B12 SERPL-MCNC: 415 PG/ML (ref 232–1245)
WBC # BLD AUTO: 5 10E3/UL (ref 4–11)

## 2022-12-09 PROCEDURE — 85027 COMPLETE CBC AUTOMATED: CPT

## 2022-12-09 PROCEDURE — 82728 ASSAY OF FERRITIN: CPT

## 2022-12-09 PROCEDURE — 83540 ASSAY OF IRON: CPT

## 2022-12-09 PROCEDURE — 86803 HEPATITIS C AB TEST: CPT

## 2022-12-09 PROCEDURE — 82607 VITAMIN B-12: CPT

## 2022-12-09 PROCEDURE — 84443 ASSAY THYROID STIM HORMONE: CPT

## 2022-12-09 PROCEDURE — 80053 COMPREHEN METABOLIC PANEL: CPT

## 2022-12-09 PROCEDURE — 82746 ASSAY OF FOLIC ACID SERUM: CPT

## 2022-12-09 PROCEDURE — 36415 COLL VENOUS BLD VENIPUNCTURE: CPT

## 2022-12-09 PROCEDURE — 83550 IRON BINDING TEST: CPT

## 2022-12-09 PROCEDURE — 84439 ASSAY OF FREE THYROXINE: CPT

## 2022-12-12 LAB — HCV AB SERPL QL IA: NONREACTIVE

## 2022-12-29 ENCOUNTER — MYC REFILL (OUTPATIENT)
Dept: FAMILY MEDICINE | Facility: CLINIC | Age: 37
End: 2022-12-29

## 2022-12-29 DIAGNOSIS — F41.1 GENERALIZED ANXIETY DISORDER: ICD-10-CM

## 2022-12-30 RX ORDER — HYDROXYZINE HYDROCHLORIDE 25 MG/1
25-50 TABLET, FILM COATED ORAL 3 TIMES DAILY PRN
Qty: 60 TABLET | Refills: 0 | Status: SHIPPED | OUTPATIENT
Start: 2022-12-30 | End: 2023-02-10

## 2022-12-30 RX ORDER — ALPRAZOLAM 0.5 MG
0.5 TABLET ORAL
Qty: 8 TABLET | Refills: 0 | Status: SHIPPED | OUTPATIENT
Start: 2022-12-30 | End: 2023-02-07

## 2023-01-08 ENCOUNTER — MYC REFILL (OUTPATIENT)
Dept: FAMILY MEDICINE | Facility: CLINIC | Age: 38
End: 2023-01-08

## 2023-01-08 DIAGNOSIS — F41.1 GENERALIZED ANXIETY DISORDER: ICD-10-CM

## 2023-01-09 RX ORDER — ALPRAZOLAM 0.5 MG
0.5 TABLET ORAL
Qty: 8 TABLET | Refills: 0 | OUTPATIENT
Start: 2023-01-09

## 2023-01-09 ASSESSMENT — ANXIETY QUESTIONNAIRES
4. TROUBLE RELAXING: NEARLY EVERY DAY
8. IF YOU CHECKED OFF ANY PROBLEMS, HOW DIFFICULT HAVE THESE MADE IT FOR YOU TO DO YOUR WORK, TAKE CARE OF THINGS AT HOME, OR GET ALONG WITH OTHER PEOPLE?: EXTREMELY DIFFICULT
5. BEING SO RESTLESS THAT IT IS HARD TO SIT STILL: NEARLY EVERY DAY
GAD7 TOTAL SCORE: 21
3. WORRYING TOO MUCH ABOUT DIFFERENT THINGS: NEARLY EVERY DAY
6. BECOMING EASILY ANNOYED OR IRRITABLE: NEARLY EVERY DAY
GAD7 TOTAL SCORE: 21
7. FEELING AFRAID AS IF SOMETHING AWFUL MIGHT HAPPEN: NEARLY EVERY DAY
7. FEELING AFRAID AS IF SOMETHING AWFUL MIGHT HAPPEN: NEARLY EVERY DAY
1. FEELING NERVOUS, ANXIOUS, OR ON EDGE: NEARLY EVERY DAY
GAD7 TOTAL SCORE: 21
2. NOT BEING ABLE TO STOP OR CONTROL WORRYING: NEARLY EVERY DAY
IF YOU CHECKED OFF ANY PROBLEMS ON THIS QUESTIONNAIRE, HOW DIFFICULT HAVE THESE PROBLEMS MADE IT FOR YOU TO DO YOUR WORK, TAKE CARE OF THINGS AT HOME, OR GET ALONG WITH OTHER PEOPLE: EXTREMELY DIFFICULT

## 2023-01-09 ASSESSMENT — PATIENT HEALTH QUESTIONNAIRE - PHQ9
10. IF YOU CHECKED OFF ANY PROBLEMS, HOW DIFFICULT HAVE THESE PROBLEMS MADE IT FOR YOU TO DO YOUR WORK, TAKE CARE OF THINGS AT HOME, OR GET ALONG WITH OTHER PEOPLE: EXTREMELY DIFFICULT
SUM OF ALL RESPONSES TO PHQ QUESTIONS 1-9: 24
SUM OF ALL RESPONSES TO PHQ QUESTIONS 1-9: 24

## 2023-01-10 ENCOUNTER — VIRTUAL VISIT (OUTPATIENT)
Dept: FAMILY MEDICINE | Facility: CLINIC | Age: 38
End: 2023-01-10
Payer: COMMERCIAL

## 2023-01-10 ENCOUNTER — TELEPHONE (OUTPATIENT)
Dept: FAMILY MEDICINE | Facility: CLINIC | Age: 38
End: 2023-01-10

## 2023-01-10 DIAGNOSIS — E66.09 CLASS 1 OBESITY DUE TO EXCESS CALORIES WITHOUT SERIOUS COMORBIDITY WITH BODY MASS INDEX (BMI) OF 32.0 TO 32.9 IN ADULT: ICD-10-CM

## 2023-01-10 DIAGNOSIS — R63.5 WEIGHT GAIN: ICD-10-CM

## 2023-01-10 DIAGNOSIS — E66.811 CLASS 1 OBESITY DUE TO EXCESS CALORIES WITHOUT SERIOUS COMORBIDITY WITH BODY MASS INDEX (BMI) OF 32.0 TO 32.9 IN ADULT: ICD-10-CM

## 2023-01-10 DIAGNOSIS — F33.1 MODERATE EPISODE OF RECURRENT MAJOR DEPRESSIVE DISORDER (H): ICD-10-CM

## 2023-01-10 DIAGNOSIS — F41.1 GENERALIZED ANXIETY DISORDER: Primary | ICD-10-CM

## 2023-01-10 DIAGNOSIS — Z71.3 ENCOUNTER FOR WEIGHT LOSS COUNSELING: ICD-10-CM

## 2023-01-10 PROBLEM — F33.9 MAJOR DEPRESSION, RECURRENT (H): Status: ACTIVE | Noted: 2023-01-10

## 2023-01-10 PROCEDURE — 99214 OFFICE O/P EST MOD 30 MIN: CPT | Mod: 95 | Performed by: STUDENT IN AN ORGANIZED HEALTH CARE EDUCATION/TRAINING PROGRAM

## 2023-01-10 RX ORDER — BUPROPION HYDROCHLORIDE 150 MG/1
150 TABLET ORAL EVERY MORNING
Qty: 60 TABLET | Refills: 0 | Status: SHIPPED | OUTPATIENT
Start: 2023-01-10 | End: 2023-02-07

## 2023-01-10 RX ORDER — SEMAGLUTIDE 1.34 MG/ML
0.25 INJECTION, SOLUTION SUBCUTANEOUS
Qty: 0.75 ML | Refills: 0 | Status: SHIPPED | OUTPATIENT
Start: 2023-01-10 | End: 2023-01-10 | Stop reason: ALTCHOICE

## 2023-01-10 RX ORDER — TOPIRAMATE 25 MG/1
TABLET, FILM COATED ORAL
Qty: 90 TABLET | Refills: 0 | Status: SHIPPED | OUTPATIENT
Start: 2023-01-10 | End: 2023-02-10

## 2023-01-10 ASSESSMENT — PATIENT HEALTH QUESTIONNAIRE - PHQ9
10. IF YOU CHECKED OFF ANY PROBLEMS, HOW DIFFICULT HAVE THESE PROBLEMS MADE IT FOR YOU TO DO YOUR WORK, TAKE CARE OF THINGS AT HOME, OR GET ALONG WITH OTHER PEOPLE: EXTREMELY DIFFICULT
SUM OF ALL RESPONSES TO PHQ QUESTIONS 1-9: 24

## 2023-01-10 ASSESSMENT — ANXIETY QUESTIONNAIRES: GAD7 TOTAL SCORE: 21

## 2023-01-10 NOTE — LETTER
My Depression Action Plan  Name: Rebecca Fischer   Date of Birth 1985  Date: 1/10/2023    My doctor: Irma Shoemaker [place]   My clinic: [facility]  [address]  [place] [id number] [phone]          GREEN    ZONE   Good Control    What it looks like:     Things are going generally well. You have normal ups and downs. You may even feel depressed from time to time, but bad moods usually last less than a day.   What you need to do:  1. Continue to care for yourself (see self care plan)  2. Check your depression survival kit and update it as needed  3. Follow your physician s recommendations including any medication.  4. Do not stop taking medication unless you consult with your physician first.           YELLOW         ZONE Getting Worse    What it looks like:     Depression is starting to interfere with your life.     It may be hard to get out of bed; you may be starting to isolate yourself from others.    Symptoms of depression are starting to last most all day and this has happened for several days.     You may have suicidal thoughts but they are not constant.   What you need to do:     1. Call your care team. Your response to treatment will improve if you keep your care team informed of your progress. Yellow periods are signs an adjustment may need to be made.     2. Continue your self-care.  Just get dressed and ready for the day.  Don't give yourself time to talk yourself out of it.    3. Talk to someone in your support network.    4. Open up your Depression Self-Care Plan/Wellness Kit.           RED    ZONE Medical Alert - Get Help    What it looks like:     Depression is seriously interfering with your life.     You may experience these or other symptoms: You can t get out of bed most days, can t work or engage in other necessary activities, you have trouble taking care of basic hygiene, or basic responsibilities, thoughts of suicide or  death that will not go away, self-injurious behavior.     What you need to do:  1. Call your care team and request a same-day appointment. If they are not available (weekends or after hours) call your local crisis line, emergency room or 911.          Depression Self-Care Plan / Wellness Kit    Many people find that medication and therapy are helpful treatments for managing depression. In addition, making small changes to your everyday life can help to boost your mood and improve your wellbeing. Below are some tips for you to consider. Be sure to talk with your medical provider and/or behavioral health consultant if your symptoms are worsening or not improving.     Sleep   Sleep hygiene  means all of the habits that support good, restful sleep. It includes maintaining a consistent bedtime and wake time, using your bedroom only for sleeping or sex, and keeping the bedroom dark and free of distractions like a computer, smartphone, or television.     Develop a Healthy Routine  Maintain good hygiene. Get out of bed in the morning, make your bed, brush your teeth, take a shower, and get dressed. Don t spend too much time viewing media that makes you feel stressed. Find time to relax each day.    Exercise  Get some form of exercise every day. This will help reduce pain and release endorphins, the  feel good  chemicals in your brain. It can be as simple as just going for a walk or doing some gardening, anything that will get you moving.      Diet  Strive to eat healthy foods, including fruits and vegetables. Drink plenty of water. Avoid excessive sugar, caffeine, alcohol, and other mood-altering substances.     Stay Connected with Others  Stay in touch with friends and family members.    Manage Your Mood  Try deep breathing, massage therapy, biofeedback, or meditation. Take part in fun activities when you can. Try to find something to smile about each day.     Psychotherapy  Be open to working with a therapist if your  provider recommends it.     Medication  Be sure to take your medication as prescribed. Most anti-depressants need to be taken every day. It usually takes several weeks for medications to work. Not all medicines work for all people. It is important to follow-up with your provider to make sure you have a treatment plan that is working for you. Do not stop your medication abruptly without first discussing it with your provider.    Crisis Resources   These hotlines are for both adults and children. They and are open 24 hours a day, 7 days a week unless noted otherwise.      National Suicide Prevention Lifeline   988 or 9-067-615-FAUP (5508)      Crisis Text Line    www.crisistextline.org  Text HOME to 545618 from anywhere in the [place], anytime, about any type of crisis. A live, trained crisis counselor will receive the text and respond quickly.      [facility] Lifeline for LGBTQ Youth  A national crisis intervention and suicide lifeline for LGBTQ youth under 25. Provides a safe place to talk without judgement. Call 1-768.177.3324; text START to 186927 or visit www.thetrevorproject.org to talk to a trained counselor.      For [facility] crisis numbers, visit the [facility] website at:  https://[place].gov[place]hs/people-we-serve/adults/health-care/mental-health/resources/crisis-contacts.jsp

## 2023-01-10 NOTE — PROGRESS NOTES
Rebecca is a 37 year old who is being evaluated via a billable video visit.      How would you like to obtain your AVS? MyChart  If the video visit is dropped, the invitation should be resent by: Text to cell phone: [phone]  Will anyone else be joining your video visit? No        Assessment & Plan     1. Generalized anxiety disorder    - Adult Mental Health  Referral; Future    2. Moderate episode of recurrent major depressive disorder (H)    - Adult Mental Health  Referral; Future  - start buPROPion (WELLBUTRIN XL) 150 MG 24 hr tablet; Take 1 tablet (150 mg) by mouth every morning For 1 week  Then 2 tabs daily thereafter  Dispense: 60 tablet; Refill: 0  - DEPRESSION ACTION PLAN (DAP)  The risks, benefits and treatment options of prescribed medications or other treatments have been discussed with the patient. The patient verbalized their understanding and should call or follow up if no improvement or if they develop further problems    3. Encounter for weight loss counseling  We will start a GLP 1 today. She denies family or personal history of MEN 1 or MTC. Advised pt to inform provider if she notices any symptoms of thyroid tumors (eg, mass in the neck, dysphagia, dyspnea, persistent hoarseness).     - semaglutide (OZEMPIC, 0.25 OR 0.5 MG/DOSE,) 2 MG/1.5ML SOPN pen; Inject 0.25 mg Subcutaneous every 7 days for 4 doses  Dispense: 0.75 mL; Refill: 0  Titrate up slowly  4. Weight gain    - semaglutide (OZEMPIC, 0.25 OR 0.5 MG/DOSE,) 2 MG/1.5ML SOPN pen; Inject 0.25 mg Subcutaneous every 7 days for 4 doses  Dispense: 0.75 mL; Refill: 0    5. Class 1 obesity due to excess calories without serious comorbidity with body mass index (BMI) of 32.0 to 32.9 in adult    - semaglutide (OZEMPIC, 0.25 OR 0.5 MG/DOSE,) 2 MG/1.5ML SOPN pen; Inject 0.25 mg Subcutaneous every 7 days for 4 doses  Dispense: 0.75 mL; Refill: 0    Patient called after the visit.  She would like to change injectable to oral  medication . Topamax has been sent to the pharmacy. She an IUD in place.  Denies personal and family history of kidney stone. Encouraged her to be well hydrated.  Side effects of medication discussed with patient. Patient made an informed decision to start medication.  She is advised to let provider know when she plans to get pregnant    Depression Screening Follow Up    PHQ 1/9/2023   PHQ-9 Total Score 24   Q9: Thoughts of better off dead/self-harm past 2 weeks More than half the days   F/U: Thoughts of suicide or self-harm No   F/U: Safety concerns No     Last PHQ-9 1/9/2023   1.  Little interest or pleasure in doing things 3   2.  Feeling down, depressed, or hopeless 3   3.  Trouble falling or staying asleep, or sleeping too much 3   4.  Feeling tired or having little energy 3   5.  Poor appetite or overeating 3   6.  Feeling bad about yourself 3   7.  Trouble concentrating 1   8.  Moving slowly or restless 3   Q9: Thoughts of better off dead/self-harm past 2 weeks 2   PHQ-9 Total Score 24   Difficulty at work, home, or with people -   In the past two weeks have you had thoughts of suicide or self harm? No   Do you have concerns about your personal safety or the safety of others? No           Follow Up      Follow Up Actions Taken  Crisis resource information provided in the After Visit Summary    Discussed the following ways the patient can remain in a safe environment: She has a supportive     Return in about 4 weeks (around 2/7/2023) for Follow up, in person.    Zaida Hart MD  [facility] Fernandez is a 37 year old presenting for the following health issues:   Follow Up (Depression and anxiety)      History of Present Illness       Mental Health Follow-up:  Patient presents to follow-up on Depression & Anxiety.Patient's depression since last visit has been:  Worse  The patient is having other symptoms associated with depression.  Patient's anxiety since  last visit has been:  Worse  The patient is having other symptoms associated with anxiety.  Any significant life events: No and health concerns  Patient is feeling anxious or having panic attacks.  Patient has no concerns about alcohol or drug use.    She eats 2-3 servings of fruits and vegetables daily.She consumes 1 sweetened beverage(s) daily.She exercises with enough effort to increase her heart rate 20 to 29 minutes per day.  She exercises with enough effort to increase her heart rate 4 days per week.   She is taking medications regularly.    Today's PHQ-9         PHQ-9 Total Score: 24    PHQ-9 Q9 Thoughts of better off dead/self-harm past 2 weeks :   More than half the days  Thoughts of suicide or self harm: (P) No  Self-harm Plan:     Self-harm Action:       Safety concerns for self or others: (P) No    How difficult have these problems made it for you to do your work, take care of things at home, or get along with other people: Extremely difficult  Today's BRETT-7 Score: 21  Patient does not have self harm plan or action.  Patient has gained weight since she has been on ssri. She stopped taking selective serotonin reuptake inhibitor last month. She is currently weighing 190. She weigh 150 b in 5/22.  She  would like to start a medication for weight loss.  She is also interested in mental health referral.  She is requesting for a refill on her paroxetine.  Advised patient to follow-up with her PCP as she has agreement signed with her.  She denies any history of seizure or eating disorder.          Review of Systems   Constitutional, HEENT, cardiovascular, pulmonary, gi and gu systems are negative, except as otherwise noted.      Objective           Vitals:  No vitals were obtained today due to virtual visit.    Physical Exam   GENERAL: Healthy, alert and no distress  EYES: Eyes grossly normal to inspection.  No discharge or erythema, or obvious scleral/conjunctival abnormalities.  RESP: No audible wheeze,  cough, or visible cyanosis.  No visible retractions or increased work of breathing.    SKIN: Visible skin clear. No significant rash, abnormal pigmentation or lesions..  PSYCH: Mentation appears normal, affect normal/bright, judgement and insight intact, normal speech and appearance well-groomed.            Video-Visit Details    Type of service:  Video Visit     Originating Location (pt. Location): Home  Distant Location (provider location):  On-site  Platform used for Video Visit: Mapplas

## 2023-01-10 NOTE — PATIENT INSTRUCTIONS
Maynor Fernandez,    Thank you for allowing [facility] to manage your care.    I sent your prescriptions to your pharmacy.    I made a referral, they will be calling in approximately 1 week to set up your appointment.  If you do not hear from them, please call the specialty number on your after visit.     For your convenience, test results are released as soon as they are available  Please allow 1-2 business days for me to send you a comment about your results.  If not done so, I encourage you to login into [employer] ([email or web address] to review your results in real time.     If you have any questions or concerns, please feel free to call us at [phone].    Sincerely,    Dr. Hart    Did you know?      You can schedule a video visit for follow-up appointments as well as future appointments for certain conditions.  Please see the below link.     [email or web address]    If you have not already done so,  I encourage you to sign up for [employer] ([email or web address]).  This will allow you to review your results, securely communicate with a provider, and schedule virtual visits as well.

## 2023-01-11 ENCOUNTER — TELEPHONE (OUTPATIENT)
Dept: DERMATOLOGY | Facility: CLINIC | Age: 38
End: 2023-01-11

## 2023-01-11 NOTE — TELEPHONE ENCOUNTER
Patient was already rescheduled to 1/13/23 at  Derm   Confirmed this appt still worked.    Patient expressed understanding.     Letha HUNTER RN  [facility]  [phone]

## 2023-01-11 NOTE — TELEPHONE ENCOUNTER
[facility] Call Center    Phone Message    May a detailed message be left on voicemail: yes     Reason for Call: Appointment Intake    Referring Provider Name: Saadia Adan PA-C in  FAMILY PRACTICE  Diagnosis and/or Symptoms: 05/03/22 pt scheduled first Appt for 08/17/22 and needed to cancel Appt and was rescheduled for today 01/11/23, now pt needs to reschedule again due to weather provider will not be in. Next available is 05/31/23. That would be over a year waiting. Pt declined to schedule that far, Pt has family Hx of melanoma. Pt has a concern, please call pt to discuss a timely matter to reschedule her Appt for today 01/11/23  Thanks     Action Taken: Message routed to:  [facility] Derm    Travel Screening: Not Applicable

## 2023-01-13 ENCOUNTER — OFFICE VISIT (OUTPATIENT)
Dept: DERMATOLOGY | Facility: CLINIC | Age: 38
End: 2023-01-13
Attending: PHYSICIAN ASSISTANT
Payer: COMMERCIAL

## 2023-01-13 DIAGNOSIS — Z12.83 ENCOUNTER FOR SCREENING FOR MALIGNANT NEOPLASM OF SKIN: ICD-10-CM

## 2023-01-13 DIAGNOSIS — L81.4 LENTIGINES: ICD-10-CM

## 2023-01-13 DIAGNOSIS — Z80.8 FAMILY HISTORY OF MELANOMA: ICD-10-CM

## 2023-01-13 DIAGNOSIS — D23.9 DERMATOFIBROMA: ICD-10-CM

## 2023-01-13 DIAGNOSIS — D22.9 MULTIPLE BENIGN NEVI: Primary | ICD-10-CM

## 2023-01-13 DIAGNOSIS — D18.01 CHERRY ANGIOMA: ICD-10-CM

## 2023-01-13 PROCEDURE — 99203 OFFICE O/P NEW LOW 30 MIN: CPT | Performed by: NURSE PRACTITIONER

## 2023-01-13 NOTE — PROGRESS NOTES
[facility] Dermatology Note  Encounter Date: Jan 13, 2023  Office Visit     Reviewed patients past medical history and pertinent chart review prior to patients visit today.     Dermatology Problem List:  Monitor lesion on left low back    Patient denies personal history of skin cancer or dysplastic nevi.    Family history of melanoma in her mother    ____________________________________________    Assessment & Plan:     # Family history of melanoma    # Benign skin findings including:dermatofibroma, cherry angioma, lentigines and benign nevi.   - Monitor lesion on left low back, favor seborrheic  keratosis  - No further intervention required. Patient to report changes.   - Patient reassured of the benign nature of these lesions.    #Signs and Symptoms of non-melanoma skin cancer and ABCDEs of melanoma reviewed with patient. Patient encouraged to perform monthly self skin exams and educated on how to perform them. UV precautions reviewed with patient. Patient was asked about new or changing moles/lesions on body.     #Reviewed Sunscreen: Apply 20 minutes prior to going outdoors and reapply every two hours, when wet or sweating. We recommend using an SPF 30 or higher, and to use one that is water resistant.   Given samples of La Roche Possay ultra light sunscreen fluid.     #Patient asked for antiaging skin care routine I recommended adapalene 1% gel over-the-counter starting every third night and slowly increasing frequency to nightly as tolerates.  Advised to moisturize well with Cetaphil or CeraVe cream nightly and apply SPF moisturizer in the morning.     Follow-up:  1 years for follow up full body skin exam, prn for new or changing lesions or new concerns    Yanni Wang, APRN CNP on 1/13/2023 at 7:34 AM    ____________________________________________    CC: Skin Check (Spots of concern on arms and chest. )    HPI:  Ms. Rebecca Fischer is a(n) 37 year old female who presents today as a  new patient for a full body skin cancer screening. Patient has concerns today about antiaging skin care routine.  She also has more brown spots on her arms and chest than she used to.  Nothing that has been particularly worrisome or changing or symptomatic..     Patient is otherwise feeling well, without additional skin concerns.     Physical Exam:  Vitals: LMP 12/01/2022 (Within Days)   SKIN: Total skin excluding the genitalia areas was performed. The exam included the head/face, neck, both arms, chest, back, abdomen, both legs, digits, mons pubis, buttock and nails.   -Left low back, 5 mm oval-shaped slightly raised papule that is half tan and half medium brown (favor SK versus nevus, monitor for changes)  -grey/brown firm papules with stellate center on dermoscopy and positive dimples sign right superior shoulder  -several 1-2mm red dome shaped symmetric papules scattered on the trunk  -multiple tan/brown flat round macules and raised papules scattered throughout trunk, extremities and head. No worrisome features for malignancy noted on examination.  -scattered tan, homogenous macules scattered on sun exposed areas of trunk, extremities and face.       - No other lesions of concern on areas examined.     Medications:  Current Outpatient Medications   Medication     ALPRAZolam (XANAX) 0.5 MG tablet     buPROPion (WELLBUTRIN XL) 150 MG 24 hr tablet     ECHINACEA EXTRACT PO     ferrous sulfate (FEROSUL) 325 (65 Fe) MG tablet     hydrOXYzine (ATARAX) 25 MG tablet     levonorgestrel (MIRENA) 20 MCG/24HR IUD     Multiple Vitamins-Minerals (EMERGEN-C IMMUNE PO)     Prenatal Vit-Fe Fumarate-FA (PRENATAL VITAMIN AND MINERAL PO)     topiramate (TOPAMAX) 25 MG tablet     No current facility-administered medications for this visit.      Past Medical History:   Patient Active Problem List   Diagnosis     Family history of melanoma     Generalized anxiety disorder     Major depression, recurrent (H)     Past Medical History:    Diagnosis Date     Anemia     on supplemental iron     NO ACTIVE PROBLEMS        CC Saadia Adan PA-C  [address] [facility]  [place] [id number] on close of this encounter.

## 2023-01-13 NOTE — LETTER
1/13/2023         RE: Rebecca Fischer  [address] [facility] [address]        Dear Colleague,    Thank you for referring your patient, Rebecca Fischer, to the [facility]. Please see a copy of my visit note below.    [facility] Dermatology Note  Encounter Date: Jan 13, 2023  Office Visit     Reviewed patients past medical history and pertinent chart review prior to patients visit today.     Dermatology Problem List:  Monitor lesion on left low back    Patient denies personal history of skin cancer or dysplastic nevi.    Family history of melanoma in her mother    ____________________________________________    Assessment & Plan:     # Family history of melanoma    # Benign skin findings including:dermatofibroma, cherry angioma, lentigines and benign nevi.   - Monitor lesion on left low back, favor seborrheic  keratosis  - No further intervention required. Patient to report changes.   - Patient reassured of the benign nature of these lesions.    #Signs and Symptoms of non-melanoma skin cancer and ABCDEs of melanoma reviewed with patient. Patient encouraged to perform monthly self skin exams and educated on how to perform them. UV precautions reviewed with patient. Patient was asked about new or changing moles/lesions on body.     #Reviewed Sunscreen: Apply 20 minutes prior to going outdoors and reapply every two hours, when wet or sweating. We recommend using an SPF 30 or higher, and to use one that is water resistant.   Given samples of La Roche Possay ultra light sunscreen fluid.     #Patient asked for antiaging skin care routine I recommended adapalene 1% gel over-the-counter starting every third night and slowly increasing frequency to nightly as tolerates.  Advised to moisturize well with Cetaphil or CeraVe cream nightly and apply SPF moisturizer in the morning.     Follow-up:  1 years for follow up full body skin exam, prn for new or changing lesions  Pt back to care unit S/P rt and left heart cath. Pt awake and alert and tolerated procedure well. TR band to rt wrist with immobilizer in place. Rt femoral sheath in rt groin. All site dry and intact. Bedrest reinforced and understood. Pt noted with low b/p 80-90/40-50, Fluids ordered by Dr Paola Smith as he was @ bedside.      1155 B/p improved 115/51 or new concerns    Yanni Wang, APRN CNP on 1/13/2023 at 7:34 AM    ____________________________________________    CC: Skin Check (Spots of concern on arms and chest. )    HPI:  Ms. Rebecca Fischer is a(n) 37 year old female who presents today as a new patient for a full body skin cancer screening. Patient has concerns today about antiaging skin care routine.  She also has more brown spots on her arms and chest than she used to.  Nothing that has been particularly worrisome or changing or symptomatic..     Patient is otherwise feeling well, without additional skin concerns.     Physical Exam:  Vitals: LMP 12/01/2022 (Within Days)   SKIN: Total skin excluding the genitalia areas was performed. The exam included the head/face, neck, both arms, chest, back, abdomen, both legs, digits, mons pubis, buttock and nails.   -Left low back, 5 mm oval-shaped slightly raised papule that is half tan and half medium brown (favor SK versus nevus, monitor for changes)  -grey/brown firm papules with stellate center on dermoscopy and positive dimples sign right superior shoulder  -several 1-2mm red dome shaped symmetric papules scattered on the trunk  -multiple tan/brown flat round macules and raised papules scattered throughout trunk, extremities and head. No worrisome features for malignancy noted on examination.  -scattered tan, homogenous macules scattered on sun exposed areas of trunk, extremities and face.       - No other lesions of concern on areas examined.     Medications:  Current Outpatient Medications   Medication     ALPRAZolam (XANAX) 0.5 MG tablet     buPROPion (WELLBUTRIN XL) 150 MG 24 hr tablet     ECHINACEA EXTRACT PO     ferrous sulfate (FEROSUL) 325 (65 Fe) MG tablet     hydrOXYzine (ATARAX) 25 MG tablet     levonorgestrel (MIRENA) 20 MCG/24HR IUD     Multiple Vitamins-Minerals (EMERGEN-C IMMUNE PO)     Prenatal Vit-Fe Fumarate-FA (PRENATAL VITAMIN AND MINERAL PO)     topiramate (TOPAMAX) 25 MG tablet      No current facility-administered medications for this visit.      Past Medical History:   Patient Active Problem List   Diagnosis     Family history of melanoma     Generalized anxiety disorder     Major depression, recurrent (H)     Past Medical History:   Diagnosis Date     Anemia     on supplemental iron     NO ACTIVE PROBLEMS        CRYS Adan PA-C  [address] [facility]  [place] [phone] on close of this encounter.      Again, thank you for allowing me to participate in the care of your patient.        Sincerely,        IGOR Morse CNP

## 2023-02-06 ASSESSMENT — PATIENT HEALTH QUESTIONNAIRE - PHQ9
SUM OF ALL RESPONSES TO PHQ QUESTIONS 1-9: 24
SUM OF ALL RESPONSES TO PHQ QUESTIONS 1-9: 24
10. IF YOU CHECKED OFF ANY PROBLEMS, HOW DIFFICULT HAVE THESE PROBLEMS MADE IT FOR YOU TO DO YOUR WORK, TAKE CARE OF THINGS AT HOME, OR GET ALONG WITH OTHER PEOPLE: EXTREMELY DIFFICULT

## 2023-02-06 ASSESSMENT — ANXIETY QUESTIONNAIRES
7. FEELING AFRAID AS IF SOMETHING AWFUL MIGHT HAPPEN: NEARLY EVERY DAY
2. NOT BEING ABLE TO STOP OR CONTROL WORRYING: NEARLY EVERY DAY
5. BEING SO RESTLESS THAT IT IS HARD TO SIT STILL: NEARLY EVERY DAY
1. FEELING NERVOUS, ANXIOUS, OR ON EDGE: NEARLY EVERY DAY
3. WORRYING TOO MUCH ABOUT DIFFERENT THINGS: NEARLY EVERY DAY
7. FEELING AFRAID AS IF SOMETHING AWFUL MIGHT HAPPEN: NEARLY EVERY DAY
GAD7 TOTAL SCORE: 21
8. IF YOU CHECKED OFF ANY PROBLEMS, HOW DIFFICULT HAVE THESE MADE IT FOR YOU TO DO YOUR WORK, TAKE CARE OF THINGS AT HOME, OR GET ALONG WITH OTHER PEOPLE?: EXTREMELY DIFFICULT
IF YOU CHECKED OFF ANY PROBLEMS ON THIS QUESTIONNAIRE, HOW DIFFICULT HAVE THESE PROBLEMS MADE IT FOR YOU TO DO YOUR WORK, TAKE CARE OF THINGS AT HOME, OR GET ALONG WITH OTHER PEOPLE: EXTREMELY DIFFICULT
4. TROUBLE RELAXING: NEARLY EVERY DAY
6. BECOMING EASILY ANNOYED OR IRRITABLE: NEARLY EVERY DAY
GAD7 TOTAL SCORE: 21
GAD7 TOTAL SCORE: 21

## 2023-02-07 ENCOUNTER — OFFICE VISIT (OUTPATIENT)
Dept: FAMILY MEDICINE | Facility: CLINIC | Age: 38
End: 2023-02-07
Payer: COMMERCIAL

## 2023-02-07 VITALS
DIASTOLIC BLOOD PRESSURE: 73 MMHG | WEIGHT: 184 LBS | HEART RATE: 92 BPM | OXYGEN SATURATION: 99 % | BODY MASS INDEX: 31.03 KG/M2 | SYSTOLIC BLOOD PRESSURE: 120 MMHG | TEMPERATURE: 98.3 F

## 2023-02-07 DIAGNOSIS — F33.1 MODERATE EPISODE OF RECURRENT MAJOR DEPRESSIVE DISORDER (H): Primary | ICD-10-CM

## 2023-02-07 DIAGNOSIS — G47.00 INSOMNIA, UNSPECIFIED TYPE: ICD-10-CM

## 2023-02-07 PROCEDURE — 99214 OFFICE O/P EST MOD 30 MIN: CPT | Performed by: PHYSICIAN ASSISTANT

## 2023-02-07 RX ORDER — TRAZODONE HYDROCHLORIDE 50 MG/1
50 TABLET, FILM COATED ORAL
Qty: 60 TABLET | Refills: 1 | Status: SHIPPED | OUTPATIENT
Start: 2023-02-07 | End: 2023-08-18

## 2023-02-07 RX ORDER — BUPROPION HYDROCHLORIDE 300 MG/1
300 TABLET ORAL EVERY MORNING
Qty: 60 TABLET | Refills: 0 | Status: SHIPPED | OUTPATIENT
Start: 2023-02-07 | End: 2023-05-10

## 2023-02-07 ASSESSMENT — ENCOUNTER SYMPTOMS: NERVOUS/ANXIOUS: 1

## 2023-02-07 ASSESSMENT — ANXIETY QUESTIONNAIRES: GAD7 TOTAL SCORE: 21

## 2023-02-07 NOTE — PROGRESS NOTES
Assessment & Plan     Moderate episode of recurrent major depressive disorder (H)  Ongoing depression, anxiety. Suggest increased dose of wellbutrin. Follow up in 4-6 weeks. If not responding, suggest we have her see psychiatry to discuss other options as she has tried and failed multiple medications to manage her anxiety and depression. Discussed xanax - ok for very infrequent use, discussed one #8 fill every 2 months.   - buPROPion (WELLBUTRIN XL) 300 MG 24 hr tablet; Take 1 tablet (300 mg) by mouth every morning    Insomnia, unspecified type  Discussed sleep issues. Wants to try other options to help sleep.   - traZODone (DESYREL) 50 MG tablet; Take 1 tablet (50 mg) by mouth nightly as needed for sleep             Depression Screening Follow Up    PHQ 2/6/2023   PHQ-9 Total Score 24   Q9: Thoughts of better off dead/self-harm past 2 weeks More than half the days   F/U: Thoughts of suicide or self-harm Yes   F/U: Self harm-plan No   F/U: Self-harm action No   F/U: Safety concerns No                 Follow Up      Follow Up Actions Taken  Encouraged follow up 4-6 weeks.    Discussed the following ways the patient can remain in a safe environment:  be around others      Return in about 6 weeks (around 3/21/2023) for Follow up.    Saadia Adan PA-C  [facility]ER[facility] Fernandez is a 37 year old, presenting for the following health issues:  Depression, Anxiety, and weight gain      Anxiety    History of Present Illness       Mental Health Follow-up:  Patient presents to follow-up on Depression & Anxiety.Patient's depression since last visit has been:  No change  The patient is not having other symptoms associated with depression.  Patient's anxiety since last visit has been:  Worse  The patient is having other symptoms associated with anxiety.  Any significant life events: No  Patient is feeling anxious or having panic attacks.  Patient has no concerns about alcohol or drug  use.    She eats 2-3 servings of fruits and vegetables daily.She consumes 1 sweetened beverage(s) daily.She exercises with enough effort to increase her heart rate 30 to 60 minutes per day.  She exercises with enough effort to increase her heart rate 5 days per week.   She is taking medications regularly.    Today's PHQ-9         PHQ-9 Total Score: 24    PHQ-9 Q9 Thoughts of better off dead/self-harm past 2 weeks :   More than half the days  Thoughts of suicide or self harm: (P) Yes  Self-harm Plan:   (P) No  Self-harm Action:     (P) No  Safety concerns for self or others: (P) No    How difficult have these problems made it for you to do your work, take care of things at home, or get along with other people: Extremely difficult  Today's BRETT-7 Score: 21     Going to do a couples counselor through her husbands work.   Gained weight on lexapro - working on weight loss. Exercising 5 days a week, eating healthier, also started topamax. Has lost about 10 pounds. Did try sertraline, but stopped after about 4-5 weeks.   Started wellbutrin 1 month ago. No new side effects. Symptoms possibly better.   Xanax helps sleep at night. Last fill 12/30/22. Hasn't tried other medications for sleep other than benadryl.  Works as an RN.    PHQ 12/5/2022 1/9/2023 2/6/2023   PHQ-9 Total Score 17 24 24   Q9: Thoughts of better off dead/self-harm past 2 weeks Not at all More than half the days More than half the days   F/U: Thoughts of suicide or self-harm - No Yes   F/U: Self harm-plan - - No   F/U: Self-harm action - - No   F/U: Safety concerns - No No     BRETT-7 SCORE 12/5/2022 1/9/2023 2/6/2023   Total Score 21 (severe anxiety) 21 (severe anxiety) 21 (severe anxiety)   Total Score 21 21 21             Review of Systems   Psychiatric/Behavioral: The patient is nervous/anxious.             Objective    /73 (BP Location: Right arm, Patient Position: Sitting, Cuff Size: Adult Regular)   Pulse 92   Temp 98.3  F (36.8  C) (Oral)   Wt  83.5 kg (184 lb)   LMP  (LMP Unknown)   SpO2 99%   Breastfeeding No   BMI 31.03 kg/m    Body mass index is 31.03 kg/m .  Physical Exam   GENERAL: healthy, alert and no distress  PSYCH: mentation appears normal, affect normal/bright

## 2023-02-09 DIAGNOSIS — Z71.3 ENCOUNTER FOR WEIGHT LOSS COUNSELING: ICD-10-CM

## 2023-02-09 DIAGNOSIS — R63.5 WEIGHT GAIN: ICD-10-CM

## 2023-02-10 RX ORDER — TOPIRAMATE 25 MG/1
TABLET, FILM COATED ORAL
Qty: 90 TABLET | Refills: 0 | OUTPATIENT
Start: 2023-02-10

## 2023-02-10 RX ORDER — TOPIRAMATE 25 MG/1
100 TABLET, FILM COATED ORAL AT BEDTIME
Qty: 360 TABLET | Refills: 0 | Status: SHIPPED | OUTPATIENT
Start: 2023-02-10 | End: 2023-05-10

## 2023-02-10 NOTE — TELEPHONE ENCOUNTER
I called patient and advised her Dr. Hauser needs to see her for refills.   I see she was recently seen by Saadia Adan, patient says Saadia is her PCP.    Patient says she is at the 100 mg at bedtime (4 tabs) dose and has lost some weight but feels she needs more time to see if this will be effective.    Re-routed to PCP Saadia Adan to address since had recent in person visit.    Eden Schmitz RN  [facility]

## 2023-02-18 ENCOUNTER — OFFICE VISIT (OUTPATIENT)
Dept: FAMILY MEDICINE | Facility: CLINIC | Age: 38
End: 2023-02-18
Payer: COMMERCIAL

## 2023-02-18 ENCOUNTER — HOSPITAL ENCOUNTER (OUTPATIENT)
Dept: GENERAL RADIOLOGY | Facility: HOSPITAL | Age: 38
Discharge: HOME | End: 2023-02-18
Payer: COMMERCIAL

## 2023-02-18 VITALS
OXYGEN SATURATION: 99 % | TEMPERATURE: 97.9 F | RESPIRATION RATE: 12 BRPM | DIASTOLIC BLOOD PRESSURE: 80 MMHG | SYSTOLIC BLOOD PRESSURE: 122 MMHG | HEART RATE: 78 BPM

## 2023-02-18 DIAGNOSIS — R51.9 FACIAL PAIN: ICD-10-CM

## 2023-02-18 DIAGNOSIS — H11.31 SUBCONJUNCTIVAL HEMORRHAGE OF RIGHT EYE: ICD-10-CM

## 2023-02-18 DIAGNOSIS — M79.641 RIGHT HAND PAIN: Primary | ICD-10-CM

## 2023-02-18 PROCEDURE — 73130 X-RAY EXAM OF HAND: CPT | Mod: RT

## 2023-02-18 PROCEDURE — 99213 OFFICE O/P EST LOW 20 MIN: CPT

## 2023-02-18 PROCEDURE — 70150 X-RAY EXAM OF FACIAL BONES: CPT

## 2023-02-18 NOTE — LETTER
February 18, 2023      Rebecca Fischer  [address] [place] [address]        To Whom It May Concern:    Rebecca Fischer  was seen on February 18, 2023.  Please excuse her from work due to injury.        Sincerely,        IGOR Shelley CNP

## 2023-02-18 NOTE — PATIENT INSTRUCTIONS
The right hand and facial bones x-rays do not show any fractures or dislocations.  Continue to use ice, Tylenol and ibuprofen for pain and swelling.  Go to the emergency department with any dizziness, nausea, vomiting, changes in vision, persistent headache and/or any new or worsening symptoms.
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
## 2023-02-24 ENCOUNTER — OFFICE VISIT (OUTPATIENT)
Dept: ORTHOPEDICS | Facility: CLINIC | Age: 38
End: 2023-02-24
Payer: COMMERCIAL

## 2023-02-24 VITALS — WEIGHT: 184 LBS | SYSTOLIC BLOOD PRESSURE: 120 MMHG | DIASTOLIC BLOOD PRESSURE: 75 MMHG | BODY MASS INDEX: 31.03 KG/M2

## 2023-02-24 DIAGNOSIS — S69.91XA HAND INJURY, RIGHT, INITIAL ENCOUNTER: ICD-10-CM

## 2023-02-24 PROCEDURE — 99204 OFFICE O/P NEW MOD 45 MIN: CPT | Performed by: PEDIATRICS

## 2023-02-24 RX ORDER — OXYCODONE HYDROCHLORIDE 5 MG/1
5 TABLET ORAL EVERY 6 HOURS PRN
Qty: 12 TABLET | Refills: 0 | Status: SHIPPED | OUTPATIENT
Start: 2023-02-24 | End: 2023-02-27

## 2023-02-24 NOTE — LETTER
[facility] [place]  [address]  [place] [address]  Phone: [phone]  Fax: [phone]      February 24, 2023      RE: Rebecca Fischer  [address] [facility] [address]        To whom it may concern:    Rebecca Fischer was seen in clinic today. The employee is ABLE to return to work next scheduled work date.    When the patient returns to work, the following restrictions apply for 2 weeks:  No use of right hand.  If unable to accommodate above, ok to remain off work.    Sincerely,          Steven Bellamy DO, CAQ
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
## 2023-02-24 NOTE — PATIENT INSTRUCTIONS
With right hand injury, ongoing symptoms, we discussed potential for occult bony injury, as well as other soft tissue injury.  Given duration of time and persistent symptoms since the injury, we discussed potential for MRI next.  MRI order for right hand has been placed.    In the meantime, icing, over-the-counter medication as needed for pain.  Also provided a one-time prescription for oxycodone, use over-the-counter medication first-line.  Provided a letter for work, restricted use of the right hand for now.  Assuming MRI obtained, plan to contact you with results.    Advanced imaging is done by appointment. Please call [facility] ([facility]/[place]) [phone]  or [facility] ([facility]/[place]) [phone]  to schedule your MRI.     Some insurance companies may require a prior authorization to be completed which can delay the time until you are able to schedule your appointment.       If you are active on [employer], you may have access to your test results before your provider is able to review the study and advise on next steps.      The clinic will call you with results. If you have not heard from the clinic within 2-3 days following your MRI, please contact us at the number listed below.      If you have any further questions for your physician or physician s care team you can contact them thru [employer] or by calling  [phone] and use option 3 to leave a voice message.   Messages received during business hours will be returned same day.          For questions about the cost of your visit, or the cost of any procedure, lab or imaging study, please contact our CONSUMER PRICE LINE at [phone] for an estimate.  You may also contact them at [email or web address]     You will be asked to provide your name, birthdate, address, phone number, and insurance information.  You will also need to know the name of any specific test or procedure which is planned.   This often requires the CPT (common procedural terminology) code for the test or procedure.  Our clinic staff can help you get that information, if needed.    For information about how your insurance will cover your clinic visit, please call the customer service number on your insurance card.    CPT: [id number]

## 2023-03-08 NOTE — PROGRESS NOTES
Patient did not return for further treatment and no additional progress was noted.  Please refer to the progress note and goal flowsheet completed on 07/27/22 for discharge information.

## 2023-03-29 ENCOUNTER — MYC REFILL (OUTPATIENT)
Dept: FAMILY MEDICINE | Facility: CLINIC | Age: 38
End: 2023-03-29
Payer: COMMERCIAL

## 2023-03-29 DIAGNOSIS — F41.1 GENERALIZED ANXIETY DISORDER: ICD-10-CM

## 2023-03-30 RX ORDER — ALPRAZOLAM 0.5 MG
0.5 TABLET ORAL
Qty: 8 TABLET | Refills: 0 | Status: SHIPPED | OUTPATIENT
Start: 2023-04-07 | End: 2023-05-10

## 2023-03-30 RX ORDER — HYDROXYZINE HYDROCHLORIDE 25 MG/1
25-50 TABLET, FILM COATED ORAL 3 TIMES DAILY PRN
Qty: 60 TABLET | Refills: 0 | Status: SHIPPED | OUTPATIENT
Start: 2023-03-30 | End: 2023-05-10

## 2023-03-31 ENCOUNTER — TELEPHONE (OUTPATIENT)
Dept: FAMILY MEDICINE | Facility: CLINIC | Age: 38
End: 2023-03-31
Payer: COMMERCIAL

## 2023-03-31 DIAGNOSIS — F41.1 GENERALIZED ANXIETY DISORDER: ICD-10-CM

## 2023-03-31 DIAGNOSIS — F33.1 MODERATE EPISODE OF RECURRENT MAJOR DEPRESSIVE DISORDER (H): Primary | ICD-10-CM

## 2023-03-31 ASSESSMENT — ANXIETY QUESTIONNAIRES
1. FEELING NERVOUS, ANXIOUS, OR ON EDGE: NEARLY EVERY DAY
GAD7 TOTAL SCORE: 19
6. BECOMING EASILY ANNOYED OR IRRITABLE: NEARLY EVERY DAY
7. FEELING AFRAID AS IF SOMETHING AWFUL MIGHT HAPPEN: MORE THAN HALF THE DAYS
7. FEELING AFRAID AS IF SOMETHING AWFUL MIGHT HAPPEN: MORE THAN HALF THE DAYS
3. WORRYING TOO MUCH ABOUT DIFFERENT THINGS: NEARLY EVERY DAY
4. TROUBLE RELAXING: NEARLY EVERY DAY
GAD7 TOTAL SCORE: 19
2. NOT BEING ABLE TO STOP OR CONTROL WORRYING: NEARLY EVERY DAY
5. BEING SO RESTLESS THAT IT IS HARD TO SIT STILL: MORE THAN HALF THE DAYS
IF YOU CHECKED OFF ANY PROBLEMS ON THIS QUESTIONNAIRE, HOW DIFFICULT HAVE THESE PROBLEMS MADE IT FOR YOU TO DO YOUR WORK, TAKE CARE OF THINGS AT HOME, OR GET ALONG WITH OTHER PEOPLE: EXTREMELY DIFFICULT
GAD7 TOTAL SCORE: 19
8. IF YOU CHECKED OFF ANY PROBLEMS, HOW DIFFICULT HAVE THESE MADE IT FOR YOU TO DO YOUR WORK, TAKE CARE OF THINGS AT HOME, OR GET ALONG WITH OTHER PEOPLE?: EXTREMELY DIFFICULT

## 2023-03-31 NOTE — TELEPHONE ENCOUNTER
"  Order/Referral Request    Who is requesting: Patient    Orders being requested: Referral to a psychologist    Reason service is needed/diagnosis: Depression and Anxiety    When are orders needed by: ASAP    Has this been discussed with Provider: No, patient has seen Saadia Adan for depression and anxiety.  Patient states,\" I don't feel like this regiment is working for me.\"  I would like a referral for a psychologist who can prescribe me something different.\"    Does patient have a preference on a Group/Provider/Facility? No    Does patient have an appointment scheduled?: No    Where to send orders: N/A    Could we send this information to you in [employer] or would you prefer to receive a phone call?:   No preference   Okay to leave a detailed message?: Yes at Cell number on file:    Telephone Information:   Mobile [phone]       Medication Question or Refill    Refill      What medication are you calling about (include dose and sig)?: Alprazolam 0.5 mg    Preferred Pharmacy:[address] [address]  Phone: [phone] Fax: [phone]      Controlled Substance Agreement on file:   CSA -- Patient Level:    CSA: None found at the patient level.       Who prescribed the medication?: Saadia Adan    Do you need a refill? Yes    When did you use the medication last? today    Patient offered an appointment? No    Do you have any questions or concerns?  Patient is going to be going on vacation and  Is requesting an early refill so she doesn't run out on vacation.      Could we send this information to you in [employer] or would you prefer to receive a phone call?:   No preference   Okay to leave a detailed message?: Yes at Cell number on file:    Telephone Information:   Mobile [phone]         "

## 2023-04-02 ASSESSMENT — PATIENT HEALTH QUESTIONNAIRE - PHQ9
SUM OF ALL RESPONSES TO PHQ QUESTIONS 1-9: 17
10. IF YOU CHECKED OFF ANY PROBLEMS, HOW DIFFICULT HAVE THESE PROBLEMS MADE IT FOR YOU TO DO YOUR WORK, TAKE CARE OF THINGS AT HOME, OR GET ALONG WITH OTHER PEOPLE: SOMEWHAT DIFFICULT
SUM OF ALL RESPONSES TO PHQ QUESTIONS 1-9: 17

## 2023-04-03 ENCOUNTER — VIRTUAL VISIT (OUTPATIENT)
Dept: PSYCHOLOGY | Facility: CLINIC | Age: 38
End: 2023-04-03
Payer: COMMERCIAL

## 2023-04-03 DIAGNOSIS — F33.1 MODERATE EPISODE OF RECURRENT MAJOR DEPRESSIVE DISORDER (H): Primary | ICD-10-CM

## 2023-04-03 PROCEDURE — 90837 PSYTX W PT 60 MINUTES: CPT | Mod: VID | Performed by: SOCIAL WORKER
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
## 2023-04-03 NOTE — TELEPHONE ENCOUNTER
Spoke with patient. Patient is requesting for a referral for psychiatry, as she feels that her current regimen is not working for her as well as she hopes. Patient currently seeing psychotherapist at [facility].    Patient also requesting to refill her Xanax prescription 2-3 days earlier than fill date, as she is traveling soon.    Please advise.    CADEN Rausch RN  [facility]

## 2023-04-03 NOTE — TELEPHONE ENCOUNTER
Patient notified of provider message as written. Patient verbalized good understanding.     Dianna RANDLEN, RN  [facility]

## 2023-05-02 ASSESSMENT — PATIENT HEALTH QUESTIONNAIRE - PHQ9
SUM OF ALL RESPONSES TO PHQ QUESTIONS 1-9: 20
10. IF YOU CHECKED OFF ANY PROBLEMS, HOW DIFFICULT HAVE THESE PROBLEMS MADE IT FOR YOU TO DO YOUR WORK, TAKE CARE OF THINGS AT HOME, OR GET ALONG WITH OTHER PEOPLE: VERY DIFFICULT
SUM OF ALL RESPONSES TO PHQ QUESTIONS 1-9: 20

## 2023-05-03 ENCOUNTER — VIRTUAL VISIT (OUTPATIENT)
Dept: PSYCHOLOGY | Facility: CLINIC | Age: 38
End: 2023-05-03
Payer: COMMERCIAL

## 2023-05-03 DIAGNOSIS — F33.1 MODERATE EPISODE OF RECURRENT MAJOR DEPRESSIVE DISORDER (H): Primary | ICD-10-CM

## 2023-05-03 PROCEDURE — 90791 PSYCH DIAGNOSTIC EVALUATION: CPT | Mod: VID | Performed by: SOCIAL WORKER
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
## 2023-05-04 ASSESSMENT — ANXIETY QUESTIONNAIRES
1. FEELING NERVOUS, ANXIOUS, OR ON EDGE: NEARLY EVERY DAY
7. FEELING AFRAID AS IF SOMETHING AWFUL MIGHT HAPPEN: NEARLY EVERY DAY
2. NOT BEING ABLE TO STOP OR CONTROL WORRYING: NEARLY EVERY DAY
6. BECOMING EASILY ANNOYED OR IRRITABLE: NEARLY EVERY DAY
6. BECOMING EASILY ANNOYED OR IRRITABLE: NEARLY EVERY DAY
3. WORRYING TOO MUCH ABOUT DIFFERENT THINGS: NEARLY EVERY DAY
IF YOU CHECKED OFF ANY PROBLEMS ON THIS QUESTIONNAIRE, HOW DIFFICULT HAVE THESE PROBLEMS MADE IT FOR YOU TO DO YOUR WORK, TAKE CARE OF THINGS AT HOME, OR GET ALONG WITH OTHER PEOPLE: EXTREMELY DIFFICULT
IF YOU CHECKED OFF ANY PROBLEMS ON THIS QUESTIONNAIRE, HOW DIFFICULT HAVE THESE PROBLEMS MADE IT FOR YOU TO DO YOUR WORK, TAKE CARE OF THINGS AT HOME, OR GET ALONG WITH OTHER PEOPLE: EXTREMELY DIFFICULT
GAD7 TOTAL SCORE: 21
7. FEELING AFRAID AS IF SOMETHING AWFUL MIGHT HAPPEN: NEARLY EVERY DAY
GAD7 TOTAL SCORE: 21
7. FEELING AFRAID AS IF SOMETHING AWFUL MIGHT HAPPEN: NEARLY EVERY DAY
8. IF YOU CHECKED OFF ANY PROBLEMS, HOW DIFFICULT HAVE THESE MADE IT FOR YOU TO DO YOUR WORK, TAKE CARE OF THINGS AT HOME, OR GET ALONG WITH OTHER PEOPLE?: EXTREMELY DIFFICULT
2. NOT BEING ABLE TO STOP OR CONTROL WORRYING: NEARLY EVERY DAY
GAD7 TOTAL SCORE: 21
3. WORRYING TOO MUCH ABOUT DIFFERENT THINGS: NEARLY EVERY DAY
8. IF YOU CHECKED OFF ANY PROBLEMS, HOW DIFFICULT HAVE THESE MADE IT FOR YOU TO DO YOUR WORK, TAKE CARE OF THINGS AT HOME, OR GET ALONG WITH OTHER PEOPLE?: EXTREMELY DIFFICULT
GAD7 TOTAL SCORE: 21
1. FEELING NERVOUS, ANXIOUS, OR ON EDGE: NEARLY EVERY DAY
GAD7 TOTAL SCORE: 21
7. FEELING AFRAID AS IF SOMETHING AWFUL MIGHT HAPPEN: NEARLY EVERY DAY
4. TROUBLE RELAXING: NEARLY EVERY DAY
5. BEING SO RESTLESS THAT IT IS HARD TO SIT STILL: NEARLY EVERY DAY
5. BEING SO RESTLESS THAT IT IS HARD TO SIT STILL: NEARLY EVERY DAY
GAD7 TOTAL SCORE: 21
4. TROUBLE RELAXING: NEARLY EVERY DAY

## 2023-05-10 ENCOUNTER — MYC REFILL (OUTPATIENT)
Dept: FAMILY MEDICINE | Facility: CLINIC | Age: 38
End: 2023-05-10
Payer: COMMERCIAL

## 2023-05-10 ENCOUNTER — VIRTUAL VISIT (OUTPATIENT)
Dept: PSYCHIATRY | Facility: CLINIC | Age: 38
End: 2023-05-10
Attending: PHYSICIAN ASSISTANT
Payer: COMMERCIAL

## 2023-05-10 ENCOUNTER — VIRTUAL VISIT (OUTPATIENT)
Dept: BEHAVIORAL HEALTH | Facility: CLINIC | Age: 38
End: 2023-05-10
Attending: PHYSICIAN ASSISTANT
Payer: COMMERCIAL

## 2023-05-10 DIAGNOSIS — F41.1 GENERALIZED ANXIETY DISORDER: ICD-10-CM

## 2023-05-10 DIAGNOSIS — F33.1 MODERATE EPISODE OF RECURRENT MAJOR DEPRESSIVE DISORDER (H): Primary | ICD-10-CM

## 2023-05-10 PROCEDURE — 99204 OFFICE O/P NEW MOD 45 MIN: CPT | Mod: VID | Performed by: STUDENT IN AN ORGANIZED HEALTH CARE EDUCATION/TRAINING PROGRAM

## 2023-05-10 PROCEDURE — 90832 PSYTX W PT 30 MINUTES: CPT | Mod: VID | Performed by: SOCIAL WORKER

## 2023-05-10 RX ORDER — ALPRAZOLAM 0.5 MG
0.5 TABLET ORAL DAILY PRN
Qty: 8 TABLET | Refills: 0 | Status: SHIPPED | OUTPATIENT
Start: 2023-05-10 | End: 2023-06-18

## 2023-05-10 RX ORDER — PROPRANOLOL HYDROCHLORIDE 10 MG/1
10-20 TABLET ORAL 3 TIMES DAILY PRN
Qty: 90 TABLET | Refills: 0 | Status: SHIPPED | OUTPATIENT
Start: 2023-05-10 | End: 2023-06-18

## 2023-05-10 RX ORDER — ALPRAZOLAM 0.5 MG
0.5 TABLET ORAL
Qty: 8 TABLET | Refills: 0 | Status: CANCELLED | OUTPATIENT
Start: 2023-05-10

## 2023-05-10 RX ORDER — VENLAFAXINE HYDROCHLORIDE 37.5 MG/1
CAPSULE, EXTENDED RELEASE ORAL
Qty: 98 CAPSULE | Refills: 0 | Status: SHIPPED | OUTPATIENT
Start: 2023-05-10 | End: 2023-06-18

## 2023-05-10 ASSESSMENT — COLUMBIA-SUICIDE SEVERITY RATING SCALE - C-SSRS
TOTAL  NUMBER OF ACTUAL ATTEMPTS LIFETIME: 1
2. HAVE YOU ACTUALLY HAD ANY THOUGHTS OF KILLING YOURSELF?: YES
ATTEMPT PAST THREE MONTHS: NO
1. HAVE YOU WISHED YOU WERE DEAD OR WISHED YOU COULD GO TO SLEEP AND NOT WAKE UP?: YES
4. HAVE YOU HAD THESE THOUGHTS AND HAD SOME INTENTION OF ACTING ON THEM?: NO
TOTAL  NUMBER OF INTERRUPTED ATTEMPTS LIFETIME: NO
5. HAVE YOU STARTED TO WORK OUT OR WORKED OUT THE DETAILS OF HOW TO KILL YOURSELF? DO YOU INTEND TO CARRY OUT THIS PLAN?: NO
5. HAVE YOU STARTED TO WORK OUT OR WORKED OUT THE DETAILS OF HOW TO KILL YOURSELF? DO YOU INTEND TO CARRY OUT THIS PLAN?: YES
2. HAVE YOU ACTUALLY HAD ANY THOUGHTS OF KILLING YOURSELF?: NO
TOTAL  NUMBER OF ABORTED OR SELF INTERRUPTED ATTEMPTS LIFETIME: NO
3. HAVE YOU BEEN THINKING ABOUT HOW YOU MIGHT KILL YOURSELF?: NO
1. IN THE PAST MONTH, HAVE YOU WISHED YOU WERE DEAD OR WISHED YOU COULD GO TO SLEEP AND NOT WAKE UP?: NO
6. HAVE YOU EVER DONE ANYTHING, STARTED TO DO ANYTHING, OR PREPARED TO DO ANYTHING TO END YOUR LIFE?: NO
ATTEMPT LIFETIME: YES

## 2023-05-10 ASSESSMENT — PATIENT HEALTH QUESTIONNAIRE - PHQ9
SUM OF ALL RESPONSES TO PHQ QUESTIONS 1-9: 20
SUM OF ALL RESPONSES TO PHQ QUESTIONS 1-9: 20
10. IF YOU CHECKED OFF ANY PROBLEMS, HOW DIFFICULT HAVE THESE PROBLEMS MADE IT FOR YOU TO DO YOUR WORK, TAKE CARE OF THINGS AT HOME, OR GET ALONG WITH OTHER PEOPLE: VERY DIFFICULT
SUM OF ALL RESPONSES TO PHQ QUESTIONS 1-9: 20
SUM OF ALL RESPONSES TO PHQ QUESTIONS 1-9: 20
10. IF YOU CHECKED OFF ANY PROBLEMS, HOW DIFFICULT HAVE THESE PROBLEMS MADE IT FOR YOU TO DO YOUR WORK, TAKE CARE OF THINGS AT HOME, OR GET ALONG WITH OTHER PEOPLE: VERY DIFFICULT

## 2023-05-10 NOTE — NURSING NOTE
Is the patient currently in the state of [place]? YES    Visit mode:VIDEO    If the visit is dropped, the patient can be reconnected by: VIDEO VISIT: Text to cell phone: [phone]    Will anyone else be joining the visit? NO      How would you like to obtain your AVS? MyChart    Are changes needed to the allergy or medication list? NO    Reason for visit: Video Visit      Care team has reviewed attendance agreement with patient. Patient advised that two failed appointments within 6 months may lead to termination of current episode of care.     Vera Gonzalez VF

## 2023-05-10 NOTE — PATIENT INSTRUCTIONS
Maynor Fernandez,    Thank you for our time together today in Collaborative Care Psychiatry Service (CCPS). CCPS provides brief psychiatric medication stabilization to patients referred by their Primary Care Providers. Patients are typically seen in CCPS for up to 4 appointments and then referred back to the PCP for ongoing refills unless longer term medication management by a specialist is indicated. If I believe you will benefit from long-term psychiatric care I will discuss this with you. If you are interested in seeing a psychiatrist or psychiatric nurse practitioner long-term, please send me a message in [employer] so we can refer you appropriately.     Treatment Plan:  start VENLAFAXINE XR 37.5mg x 7 days then increase to 75mg daily x 7 days then increase to 112.5mg x 7 days then increase to 150mg daily  start PROPRANOLOL 10-20mg TID PRN for anxiety/panic  continue ALPRAZOLAM 0.5mg daily if needed for panic   hold HYDROXYZINE for now  ok to start MAGNESIUM GLYCINATE 200mg at bedtime x 7 nights; ok to increase to 400mg nightly if needed for sleep quality and anxiety  Follow up in 4 weeks or sooner if needed.  You can call [phone] to make appointments, leave a message with our nursing team, and inquire about any mental health referrals I have placed.  Please call your pharmacy to request a refill of your medicines listed above if needed between appointments.     IGOR Pierre  Collaborative Care Psychiatry Service  [facility]   Patient Education   [facility] Care Psychiatry Service  What to Expect  Here's what to expect from your Collaborative Care Psychiatry Service (CCPS).   About CCPS  CCPS means 2 people work together to help you get better. You'll meet with a behavioral health clinician and a psychiatric doctor. A behavioral health clinician helps people with mental health problems by talking with them. A psychiatric doctor helps people by giving them medicine.  How it works  At every visit,  "you'll see the behavioral health clinician (BHC) first. They'll talk with you about how you're doing and teach you how to feel better.   Then you'll see the psychiatric doctor. This doctor can help you deal with troubling thoughts and feelings by giving you medicine. They'll make sure you know the plan for your care.   CCPS usually takes 3 to 6 visits. If you need more visits, we may have you start seeing a different psychiatric doctor for ongoing care.  If you have any questions or concerns, we'll be glad to talk with you.  About visits  Be open  At your visits, please talk openly about your problems. It may feel hard, but it's the best way for us to help you.  Cancelling visits  If you can't come to your visit, please call us right away at [phone]. If you don't cancel at least 24 hours (1 full day) before your visit, that's \"late cancellation.\"  Being late to visits  Being very late is the same as not showing up. You will be a \"no show\" if:  Your appointment starts with a BHC, and you're more than 15 minutes late for a 30-minute (half hour) visit. This will also cancel your appointment with the psychiatric doctor.  Your appointment is with a psychiatric doctor only, and you're more than 15 minutes late for a 30-minute (half hour) visit.  Your appointment is with a psychiatric doctor only, and you're more than 30 minutes late for a 60-minute (full hour) visit.  If you cancel late or don't show up 2 times within 6 months, we may end your care.   Getting help between visits  If you need help between visits, you can call us Monday to Friday from 8 a.m. to 4:30 p.m. at [phone].  Emergency care  Call 911 or go to the nearest emergency department if your life or someone else's life is in danger.  Call 948 anytime to reach the national Suicide and Crisis hotline.  Medicine refills  To refill your medicine, call your pharmacy. You can also call [facility]'s Behavioral Access at [phone], Monday " to Friday, 8 a.m. to 4:30 p.m. It can take 1 to 3 business days to get a refill.   Forms, letters, and tests  You may have papers to fill out, like FMLA, short-term disability, and workability. We can help you with these forms at your visits, but you must have an appointment. You may need more than 1 visit for this, to be in an intensive therapy program, or both.  Before we can give you medicine for ADHD, we may refer you to get tested for it or confirm it another way.  We may not be able to give you an emotional support animal letter.  We don't do mental health checks ordered by the court.   We don't do mental health testing, but we can refer you to get tested.   Thank you for choosing us for your care.  For informational purposes only. Not to replace the advice of your health care provider. Copyright   2022 [facility]. All rights reserved. [employer] [id number] - 12/22.
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
## 2023-05-10 NOTE — Clinical Note
Maynor Zee,  I will continue PRN alprazolam for now. I told Rebecca it's ok to keep that as a rescue medicine but that I would not increase the quantity or provide early refills either. Ideally we can control her anxiety to the point she will need it less than 8 tabs/mo and she understands that is the goal.   [facility]

## 2023-05-10 NOTE — PROGRESS NOTES
[facility]  May 10, 2023      Behavioral Health Clinician Progress Note    Patient Name: Rebecca Fischer           Service Type:  Individual      Service Location:   [facility] / Email (patient reached)     Session Start Time: 10:00 am  Session End Time: 10:31 am      Session Length: 16 - 37      Attendees: Patient     Service Modality:  Video Visit:      Provider verified identity through the following two step process.  Patient provided:  Patient was verified at admission/transfer    Telemedicine Visit: The patient's condition can be safely assessed and treated via synchronous audio and visual telemedicine encounter.      Reason for Telemedicine Visit: Services only offered telehealth    Originating Site (Patient Location): Patient's home    Distant Site (Provider Location): Provider Remote Setting- Home Office    Consent:  The patient/guardian has verbally consented to: the potential risks and benefits of telemedicine (video visit) versus in person care; bill my insurance or make self-payment for services provided; and responsibility for payment of non-covered services.     Patient would like the video invitation sent by:  My Chart    Mode of Communication:  Video Conference via [facility]    Distant Location (Provider):  Off-site    As the provider I attest to compliance with applicable laws and regulations related to telemedicine.    Visit Activities (Refresh list every visit): NEW and [facility] Only    Diagnostic Assessment Date: 04/03/2023 [facility]  Treatment Plan Review Date: due by 3rd visit  See Flowsheets for today's PHQ-9 and BRETT-7 results  Previous PHQ-9:     4/2/2023     1:56 PM 5/2/2023     2:45 PM 5/10/2023     9:13 AM   PHQ-9 SCORE   PHQ-9 Total Score Hai 17 (Moderately severe depression) 20 (Severe depression) 20 (Severe depression)   PHQ-9 Total Score 17 20 20    20     Previous BRETT-7:       2/6/2023    11:17 AM 3/31/2023    11:25 AM 5/4/2023     9:16  AM   BRETT-7 SCORE   Total Score 21 (severe anxiety) 19 (severe anxiety) 21 (severe anxiety)   Total Score 21 19 21    21    21       IMANI LEVEL:      9/29/2022     7:48 AM   IMANI Score (Last Two)   IMANI Raw Score 36   Activation Score 75.5   IMANI Level 4       DATA  Extended Session (60+ minutes): No  Interactive Complexity: No  Crisis: No  [facility] Patient: No    Treatment Objective(s) Addressed in This Session:  Target Behavior(s): Anxiety and depression    Depressed Mood: Increase interest, engagement, and pleasure in doing things  Decrease frequency and intensity of feeling down, depressed, hopeless  Feel less tired and more energy during the day   Anxiety: will experience a reduction in anxiety    Current Stressors / Issues:  First appointment with patient in [facility] and was advised of the short-term, team based structure of the model including role of [facility] and provider. Patient indicated understanding of the model and agreed to proceed with services as described. Care team has reviewed attendance agreement with patient. Patient advised that two failed appointments within 6 months may lead to termination of current episode of care.      Diagnostic Assessment completed through [facility] [place] dated 04/03/2023.    MH Update: Depression/anxiety has been high over the past year and has tried multiple medications and dosages. Feels like things are even getting worse with trying different things. Depression increased after birth of son (PPD) and life stressors. Seemed to get better for a while but then still very depressed a year ago. Anxiety is primary concern. Fears about kids going to school and something happening, fears to drive on the freeway, etc. Feels anxiety very physically; shaking, headaches, body tension, issues with sleep. Worse over the past year. Better able to manage it in the past and not now. Self care is not working; meditation, breathing, etc. Feels overwhelmed.  Poor sleep with difficulty falling and being  able to stay asleep.    Review of Symptoms per patient report:   Depression: Change in sleep, Lack of interest, Excessive or inappropriate guilt, Change in energy level, Difficulties concentrating, Change in appetite, Feelings of hopelessness, Feelings of helplessness, Low self-worth, Ruminations, Irritability, Feeling sad, down, or depressed, Withdrawn, Frequent crying and Anger outbursts  Dionna:  No Symptoms Fam hx Bipolar: sister (?)  Psychosis: No Symptoms  Anxiety: Excessive worry, Nervousness, Physical complaints, such as headaches, stomachaches, muscle tension, Social anxiety, Fears/phobias several fears about kids and driving, Psychomotor agitation, Ruminations, Poor concentration, Irritability and Anger outbursts  Panic:  Palpitations, Shortness of breath and Sense of impending doom panic attacks in the past  Post Traumatic Stress Disorder:  No Symptoms   Eating Disorder: No Symptoms  ADD / ADHD:  No symptoms dx in high school and not get treatment  Conduct Disorder: No symptoms  Autism Spectrum Disorder: No symptoms  Obsessive Compulsive Disorder: No Symptoms    Patient reports the following compulsive behaviors and treatment history: Shopping / Spending - has not had treatment. Spends online or at mall on things she doesn't need to try and feel better. Is able to stop self but not trying to right now.      SH: denies current or past.  SI: denies current. Did have thoughts in high school of wanting life to be over. Put self in dangerous situations. Did overdose once on medications. Not take enough to actually harm self. Went to be and woke up. Did not seek help. No other attempts. Decided that not actually want to die. No SI since then. Discussed crisis resources. CSSRS completed.    Stressors: work, school, marriage. 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 Impact on being pregnant and working through pandemic. Quit job for a year and then asked to return to work  July 2022. Online program for masters in nursing and business administration. Overall going well but adds to being busy. Over half way done and feels needs to push through. Always been an over achiever and add things on. Marital tension with trust and disconnect. Tried some couple's therapy over the past year and not really commit to using it to improve.    Tx: Mau Kaylan with [facility]. Just started seeing. Has had therapy in the past. Not sure how helpful it is right now with the physical symptoms.  [facility] encouraged patient to give it a few more sessions as patient tries to work on improving her physical symptoms.      Etoh: 2/week and 2-5 drinks at a time  Substance: denies  Nicotine: denies, non smoker  Caffeine: 2 cups of coffee a day, sometimes more if working    Most Important: medication for anxiety    Progress on Treatment Objective(s) / Homework:  Not applicable-session spent establishing rapport and assessing for need    Motivational Interviewing    MI Intervention: Expressed Empathy/Understanding, Supported Autonomy, Collaboration, Evocation, Reflections: simple and complex, Change talk (evoked) and Reframe     Change Talk Expressed by the Patient: Reasons to change Need to change Committment to change    Provider Response to Change Talk: E - Evoked more info from patient about behavior change, A - Affirmed patient's thoughts, decisions, or attempts at behavior change, R - Reflected patient's change talk and S - Summarized patient's change talk statements    Also provided psychoeducation about behavioral health condition, symptoms, and treatment options    Care Plan review completed: No    Medication Review:  No changes to current psychiatric medication(s)    Medication Compliance:  Yes    Changes in Health Issues:   None reported    Chemical Use Review:   Substance Use: Chemical use reviewed, no active concerns identified      Tobacco Use: No current tobacco use.      Assessment: Current Emotional / Mental  Status (status of significant symptoms):  Risk status (Self / Other harm or suicidal ideation)  Patient has had a history of suicidal ideation: Patient reports a history of suicidal ideation in high school and suicide attempts: Patient reports an attempt in high school  Patient denies current fears or concerns for personal safety.  Patient denies current or recent suicidal ideation or behaviors.  Patient denies current or recent homicidal ideation or behaviors.  Patient denies current or recent self injurious behavior or ideation.  Patient denies other safety concerns.  A safety and risk management plan has not been developed at this time, however patient was encouraged to call [facility] should there be a change in any of these risk factors.  Patient declines having crisis resources sent to her at this time and knows how to access them if needed.    Appearance:   Appropriate   Eye Contact:   Good   Psychomotor Behavior: Normal   Attitude:   Cooperative  Interested  Orientation:   All  Speech   Rate / Production: Normal    Volume:  Normal   Mood:    Anxious  Depressed   Affect:    Labile  Tearful  Thought Content:  Clear   Thought Form:  Coherent  Logical   Insight:    Good     Diagnoses:  1. Moderate episode of recurrent major depressive disorder (H)    2. Generalized anxiety disorder      Collateral Reports Completed:  Communicated with: [facility] Team    Plan: (Homework, other):  Patient was given information about behavioral services and encouraged to schedule a follow up appointment with the clinic [facility] as needed.  She was also given information about mental health symptoms and treatment options .  Patient is connected with an individual therapist through [facility].  CD Recommendations: No indications of CD issues.       BRIAN Zamora  May 10, 2023  Answers for HPI/ROS submitted by the patient on 5/10/2023  If you checked off any problems, how difficult have these problems made it for you to do your  work, take care of things at home, or get along with other people?: Very difficult  PHQ9 TOTAL SCORE: 20  BRETT 7 TOTAL SCORE: 21

## 2023-05-19 ENCOUNTER — VIRTUAL VISIT (OUTPATIENT)
Dept: PSYCHOLOGY | Facility: CLINIC | Age: 38
End: 2023-05-19
Payer: COMMERCIAL

## 2023-05-19 DIAGNOSIS — F33.1 MODERATE EPISODE OF RECURRENT MAJOR DEPRESSIVE DISORDER (H): Primary | ICD-10-CM

## 2023-05-19 PROCEDURE — 90837 PSYTX W PT 60 MINUTES: CPT | Mod: VID | Performed by: SOCIAL WORKER

## 2023-05-19 NOTE — PROGRESS NOTES
[facility]                                     Progress Note    Patient Name: Rebecca Fischer  Date: 5.19.23         Service Type: Individual      Session Start Time: 1230pm  Session End Time: 125pm     Session Length: 55min    Session #: 3    Attendees: Client attended alone    Service Modality:  Video Visit:      Provider verified identity through the following two step process.  Patient provided:  Patient photo    Telemedicine Visit: The patient's condition can be safely assessed and treated via synchronous audio and visual telemedicine encounter.      Reason for Telemedicine Visit: Patient has requested telehealth visit    Originating Site (Patient Location): Patient's home    Distant Site (Provider Location): Provider Remote Setting- Home Office    Consent:  The patient/guardian has verbally consented to: the potential risks and benefits of telemedicine (video visit) versus in person care; bill my insurance or make self-payment for services provided; and responsibility for payment of non-covered services.     Patient would like the video invitation sent by:  Send to e-mail at: [email or web address]    Mode of Communication:  Video Conference via [facility]    Distant Location (Provider):  Off-site    As the provider I attest to compliance with applicable laws and regulations related to telemedicine.    DATA  Extended Session (53+ minutes): PROLONGED SERVICE IN THE OUTPATIENT SETTING REQUIRING DIRECT (FACE-TO-FACE) PATIENT CONTACT BEYOND THE USUAL SERVICE:    - Longer session due to limited access to mental health appointments and necessity to address patient's distress / complexity  Interactive Complexity: No  Crisis: No      Progress Since Last Session (Related to Symptoms / Goals / Homework):   Symptoms: similar to last session    Homework: Completed in session      Episode of Care Goals: Minimal progress - PREPARATION (Decided to change - considering how); Intervened by negotiating a  "change plan and determining options / strategies for behavior change, identifying triggers, exploring social supports, and working towards setting a date to begin behavior change      Current / Ongoing Stressors and Concerns: Pt reflected taking a few short walks alone, she reflected on the thought \"it was nice.\" Went to a [employer], listened to music for mother's day. We reflected on her being around her kids, less irritable. She notes not intentionally watching news, processed moments to where 'don't have as much control to what goes on in school.' During time off life, sadness, loneliness, hoping that somebody would step in, 'not having to rely on myself by believing there is someone I could reach out to.\" She notes at that time, did poetry, ended at 16. Processed moments of thoughts, feelings, and reactions. Discussed relationship with mother, stepfather, sister is not close. We discussed the other idea of how traumatic experience may impact her sense of trust to others, very closed. I'm the one that caused people pain, always, we discussed recent role of , did restore my poncho in humanity, worked together, he believed in me. For the remainder of the session, discussed idea of various ways thoughts have been reshaped by trauma.     Treatment Objective(s) Addressed in This Session:   Identify thoughts, bodily sensations, feelings, urges  Identify toward moves, actions that align with her values  Identify away moves, actions that bring her further away from values, avoidant behavior      Intervention:   ACT therapy    Assessments completed prior to visit:  The following assessments were completed by patient for this visit:  PHQ9:       7/26/2022     5:13 PM 12/5/2022     3:20 PM 1/9/2023     5:58 PM 2/6/2023    11:17 AM 4/2/2023     1:56 PM 5/2/2023     2:45 PM 5/10/2023     9:13 AM   PHQ-9 SCORE   [facility]  17 (Moderately severe depression) 24 (Severe depression) 24 (Severe " depression) 17 (Moderately severe depression) 20 (Severe depression) 20 (Severe depression)   PHQ-9 Total Score 12 17 24 24 17 20 20    20     GAD7:       5/31/2022     9:17 AM 7/26/2022     5:13 PM 12/5/2022     3:20 PM 1/9/2023     5:58 PM 2/6/2023    11:17 AM 3/31/2023    11:25 AM 5/4/2023     9:16 AM   BRETT-7 SCORE   Total Score 14 (moderate anxiety)  21 (severe anxiety) 21 (severe anxiety) 21 (severe anxiety) 19 (severe anxiety) 21 (severe anxiety)   Total Score 14 17 21 21 21 19 21    21    21         ASSESSMENT: Current Emotional / Mental Status (status of significant symptoms):   Risk status (Self / Other harm or suicidal ideation)   Patient denies current fears or concerns for personal safety.   Patient denies current or recent suicidal ideation or behaviors.   Patient denies current or recent homicidal ideation or behaviors.   Patient denies current or recent self injurious behavior or ideation.   Patient denies other safety concerns.   Patient reports there has been no change in risk factors since their last session.     Patient reports there has been no change in protective factors since their last session.     Recommended that patient call 911 or go to the local ED should there be a change in any of these risk factors.     Appearance:   Appropriate    Eye Contact:   Good    Psychomotor Behavior: Normal    Attitude:   Cooperative    Orientation:   All   Speech    Rate / Production: Normal     Volume:  Normal    Mood:    Anxious    Affect:    Worrisome    Thought Content:  Clear    Thought Form:  Coherent  Logical    Insight:    Good      Medication Review:   No changes to current psychiatric medication(s)     Medication Compliance:   Yes     Changes in Health Issues:   None reported     Chemical Use Review:   Substance Use: Chemical use reviewed, no active concerns identified      Tobacco Use: No current tobacco use.      Diagnosis:  No diagnosis found.    Collateral Reports Completed:   Not  Applicable    PLAN: (Patient Tasks / Therapist Tasks / Other):  Pt will reflect on idea of various ways thoughts have been reshaped by trauma.        JARRET Ahn, [facility], 5.19.23

## 2023-05-24 NOTE — PROGRESS NOTES
[facility]                                     Progress Note    Patient Name: Rebecca Fischer  Date: 5.25.23         Service Type: Individual      Session Start Time: 8am  Session End Time: 855am     Session Length: 55min    Session #: 4    Attendees: Client attended alone    Service Modality:  Video Visit:      Provider verified identity through the following two step process.  Patient provided:  Patient photo    Telemedicine Visit: The patient's condition can be safely assessed and treated via synchronous audio and visual telemedicine encounter.      Reason for Telemedicine Visit: Patient has requested telehealth visit    Originating Site (Patient Location): Patient's home    Distant Site (Provider Location): Provider Remote Setting- Home Office    Consent:  The patient/guardian has verbally consented to: the potential risks and benefits of telemedicine (video visit) versus in person care; bill my insurance or make self-payment for services provided; and responsibility for payment of non-covered services.     Patient would like the video invitation sent by:  Send to e-mail at: [email or web address]    Mode of Communication:  Video Conference via [employer]    Distant Location (Provider):  Off-site    As the provider I attest to compliance with applicable laws and regulations related to telemedicine.    DATA  Extended Session (53+ minutes): PROLONGED SERVICE IN THE OUTPATIENT SETTING REQUIRING DIRECT (FACE-TO-FACE) PATIENT CONTACT BEYOND THE USUAL SERVICE:    - Longer session due to limited access to mental health appointments and necessity to address patient's distress / complexity  Interactive Complexity: No  Crisis: No      Progress Since Last Session (Related to Symptoms / Goals / Homework):   Symptoms: similar to last session    Homework: Completed in session      Episode of Care Goals: Minimal progress - PREPARATION (Decided to change - considering how); Intervened by negotiating a  "change plan and determining options / strategies for behavior change, identifying triggers, exploring social supports, and working towards setting a date to begin behavior change      Current / Ongoing Stressors and Concerns: Pt reflected at night after our last session, a lot to think about. She reflected on feeling sadness for \"myself back then.\"  She described crying and anxious that night, felt sick. She reflected that \"now there was a name to it, it gave me permission to not feeling overwhelming guilty.\" She reflected on how it came and went through the week. She reflected noticing herself thinking about the trauma that she normally does not think about. She reflected since last session remembering, \"how did parents figure out her relationship\" she reflected  The reason why everyone found out is because sister discovered her and Rasheed being intimate together. She got angry at Rasheed, brought her back to her office, went home, she went to bed, sister was taking a shower, she remembers calling the police, paramedics came, sister was taken on 72 hour hold. She remembered her stepfather picked her up, he had voiced \"I am so sorry this happened.\" He was really mad at Rasheed, him feeling bad. We explored moment to where pt's sister walked in \"worst moment ever\", stomach sank, felt nervous, sad, scared. She notes feeling disgusted/ashamed with self and with Rasheed. Pt reflected in the office \"I was hitting myself with the phone, maybe was saying how could you do this, this is your fault, you could have told somebody before.\" She would remind herself \"it wasn't all your fault, you were seeking your need of attention, love, I mattered.\" We discussed how this impacted her closeness to her dad and sister. She notes after this happened \"he didn't want to talk to me for months.\" For the remainder of the session, encouraged pt to become curious in her day to day life if she notices herself placing blame on herself for something " "that happened and use this as a moment to check in whether she is experiencing a feeling/situation reminder of trauma. Encouraged pt to use this time to revert back to her past younger self and remind herself \"it wasn't your fault, you were seeking your need of attention, love, and knowing that you mattered and you did the best that you could with what you had at that time of your life.\" We also discussed how pt's perception experience of how to receive/give love, attention in knowing that she mattered/letting others know that they mattered may have been skewed because she missed out on a development stage of her life to see how to safely experience and give this to others.      Treatment Objective(s) Addressed in This Session:   Identify thoughts, bodily sensations, feelings, urges  Identify toward moves, actions that align with her values  Identify away moves, actions that bring her further away from values, avoidant behavior      Intervention:   ACT therapy    Assessments completed prior to visit:  The following assessments were completed by patient for this visit:  PHQ9:       7/26/2022     5:13 PM 12/5/2022     3:20 PM 1/9/2023     5:58 PM 2/6/2023    11:17 AM 4/2/2023     1:56 PM 5/2/2023     2:45 PM 5/10/2023     9:13 AM   PHQ-9 SCORE   PHQ-9 Total Score MyChart  17 (Moderately severe depression) 24 (Severe depression) 24 (Severe depression) 17 (Moderately severe depression) 20 (Severe depression) 20 (Severe depression)   PHQ-9 Total Score 12 17 24 24 17 20 20    20     GAD7:       5/31/2022     9:17 AM 7/26/2022     5:13 PM 12/5/2022     3:20 PM 1/9/2023     5:58 PM 2/6/2023    11:17 AM 3/31/2023    11:25 AM 5/4/2023     9:16 AM   BRETT-7 SCORE   Total Score 14 (moderate anxiety)  21 (severe anxiety) 21 (severe anxiety) 21 (severe anxiety) 19 (severe anxiety) 21 (severe anxiety)   Total Score 14 17 21 21 21 19 21    21    21         ASSESSMENT: Current Emotional / Mental Status (status of significant " "symptoms):   Risk status (Self / Other harm or suicidal ideation)   Patient denies current fears or concerns for personal safety.   Patient denies current or recent suicidal ideation or behaviors.   Patient denies current or recent homicidal ideation or behaviors.   Patient denies current or recent self injurious behavior or ideation.   Patient denies other safety concerns.   Patient reports there has been no change in risk factors since their last session.     Patient reports there has been no change in protective factors since their last session.     Recommended that patient call 911 or go to the local ED should there be a change in any of these risk factors.     Appearance:   Appropriate    Eye Contact:   Good    Psychomotor Behavior: Normal    Attitude:   Cooperative    Orientation:   All   Speech    Rate / Production: Normal     Volume:  Normal    Mood:    Anxious    Affect:    Worrisome    Thought Content:  Clear    Thought Form:  Coherent  Logical    Insight:    Good      Medication Review:   No changes to current psychiatric medication(s)     Medication Compliance:   Yes     Changes in Health Issues:   None reported     Chemical Use Review:   Substance Use: Chemical use reviewed, no active concerns identified      Tobacco Use: No current tobacco use.      Diagnosis:  296.22 (F32.1)  Major Depressive Disorder, Single Episode, Moderate With anxious distress or 309.81 (F43.10)     R/o Posttraumatic Stress Disorder (includes Posttraumatic Stress Disorder, Without dissociative symptoms    Collateral Reports Completed:   Not Applicable    PLAN: (Patient Tasks / Therapist Tasks / Other):  encouraged pt to become curious in her day to day life if she notices herself placing blame on herself for something that happened and use this as a moment to check in whether she is experiencing a feeling/situation reminder of trauma. Encouraged pt to use this time to revert back to her past younger self and remind herself \"it " "wasn't your fault, you were seeking your need of attention, love, and knowing that you mattered and you did the best that you could with what you had at that time of your life.\"        Javed Kimbrough, JARRET, [facility], 5.25.23                                                    Individual Treatment Plan    Patient's Name: Rebecca Fischer  YOB: 1985    Date of Creation: 5.25.23  Date Treatment Plan Last Reviewed/Revised: 5.25.23    DSM5 Diagnoses: 296.22 (F32.1)  Major Depressive Disorder, Single Episode, Moderate With anxious distress or 309.81 (F43.10) Posttraumatic Stress Disorder (includes Posttraumatic Stress Disorder for Children 6 Years and Younger)  Without dissociative symptoms  Psychosocial / Contextual Factors: panic symptoms, significant family/work/school responsibilities   PROMIS (reviewed every 90 days):     Referral / Collaboration:  Referral to another professional/service is not indicated at this time..    Anticipated number of session for this episode of care: 12+  Anticipation frequency of session: Biweekly  Anticipated Duration of each session: 38-52, 53+ when needed  Treatment plan will be reviewed in 90 days or when goals have been changed.     Goal 2: Client will decrease her depressive feelings from a baseline PHQ-9 score of 15 to a post score of 5.     I will know I've met my goal when I have more energy and feel genuinely better about myself.       Objective #A              Client increase her understanding of depressive feelings, including developing vocabulary to describe depression and identify cues and symptoms. Client will also identify areas of vulnerability that make her symptoms increase in frequency and intensity.  Status:Status: New - Date: 5.25.23   Intervention(s)  Therapist will provide educational materials on depression anxiety and help to identify clients cues and symptoms.      Objective #B  Client will Increase interest, engagement, and pleasure in doing " things  Decrease frequency and intensity of feeling down, depressed, hopeless.  Status: Status: New - Date: 5.25.23     Intervention(s)  Therapist will provide educational materials on CBT and behavioral activation, including thought logs and activity logs.     Objective #C  Client will identify negative self-talk and behaviors: challenge core beliefs, myths, and actions.   Status: Status: New - Date: 5.25.23      Intervention(s)  Therapist will provide educational materials on core beliefs, cognitive distortions.  teach emotional regulation skills. Including accepting emotions and thoughts.     Goal 2: Client will increase her understanding and comprehension of PTSD.     I will know I've met my goal when I am able to put meaning behind what I am feeling.       Objective #A    Status: New - Date: 5.25.23     Client will increasing her understanding of PTSD.      Intervention(s)  Therapist will provide educational materials on PTSD, including providing vocabulary to describe feelings associated with PTSD.     Objective #B  Client will Identify cues and symptoms related to PTSD.                    Status: New - Date: 5.25.23     Intervention(s)  Therapist will provide educational materials on PTSD cues and symptoms.  teach the client how to perform a behavioral chain analysis.      Objective #C  Client will increase her coping and grounding skills to stay emotionally regulated during distressing situations.  Status: New - Date: 5.25.23     Intervention(s)  Therapist will teach coping and grounding skills and present examples during session.    Patient has reviewed and agreed to the above plan.      JARRET Ahn, [facility] May 25 2023

## 2023-05-25 ENCOUNTER — VIRTUAL VISIT (OUTPATIENT)
Dept: PSYCHOLOGY | Facility: CLINIC | Age: 38
End: 2023-05-25
Payer: COMMERCIAL

## 2023-05-25 DIAGNOSIS — F33.1 MODERATE EPISODE OF RECURRENT MAJOR DEPRESSIVE DISORDER (H): Primary | ICD-10-CM

## 2023-05-25 PROCEDURE — 90837 PSYTX W PT 60 MINUTES: CPT | Mod: VID | Performed by: SOCIAL WORKER

## 2023-06-18 ENCOUNTER — MYC REFILL (OUTPATIENT)
Dept: PSYCHIATRY | Facility: CLINIC | Age: 38
End: 2023-06-18
Payer: COMMERCIAL

## 2023-06-18 DIAGNOSIS — F41.1 GENERALIZED ANXIETY DISORDER: ICD-10-CM

## 2023-06-18 DIAGNOSIS — F33.1 MODERATE EPISODE OF RECURRENT MAJOR DEPRESSIVE DISORDER (H): ICD-10-CM

## 2023-06-20 RX ORDER — PROPRANOLOL HYDROCHLORIDE 10 MG/1
10-20 TABLET ORAL 3 TIMES DAILY PRN
Qty: 90 TABLET | Refills: 1 | Status: SHIPPED | OUTPATIENT
Start: 2023-06-20 | End: 2023-07-28

## 2023-06-20 RX ORDER — ALPRAZOLAM 0.5 MG
0.5 TABLET ORAL DAILY PRN
Qty: 8 TABLET | Refills: 0 | Status: SHIPPED | OUTPATIENT
Start: 2023-06-20 | End: 2023-07-18

## 2023-06-20 RX ORDER — VENLAFAXINE HYDROCHLORIDE 150 MG/1
150 CAPSULE, EXTENDED RELEASE ORAL DAILY
Qty: 30 CAPSULE | Refills: 1 | Status: SHIPPED | OUTPATIENT
Start: 2023-06-20 | End: 2023-07-28

## 2023-07-18 ENCOUNTER — MYC REFILL (OUTPATIENT)
Dept: PSYCHIATRY | Facility: CLINIC | Age: 38
End: 2023-07-18
Payer: COMMERCIAL

## 2023-07-18 DIAGNOSIS — F41.1 GENERALIZED ANXIETY DISORDER: ICD-10-CM

## 2023-07-18 RX ORDER — ALPRAZOLAM 0.5 MG
0.5 TABLET ORAL DAILY PRN
Qty: 8 TABLET | Refills: 0 | Status: SHIPPED | OUTPATIENT
Start: 2023-07-18 | End: 2023-09-09

## 2023-07-18 NOTE — TELEPHONE ENCOUNTER
Date of Last Office Visit: 5/10/23  Date of Next Office Visit: 7/28/23  No shows since last visit: 1, 6/5/23  Cancellations since last visit: 0    Medication requested: ALPRAZolam (XANAX) 0.5 MG tablet Date last ordered: 6/20/23 Qty: 8 Refills: 0     Review of [place] ?: Yes  Medication last sold date: 6/22/23 Qty filled: 8  Other controlled substance on [place] ?: no    Lapse in medication adherence greater than 5 days?: No  If yes, call patient and gather details: na  Medication refill request verified as identical to current order?: yes  Result of Last DAM, VPA, Li+ Level, CBC, or Carbamazepine Level (at or since last visit): N/A    Last visit treatment plan:   start VENLAFAXINE XR 37.5mg x 7 days then increase to 75mg daily x 7 days then increase to 112.5mg x 7 days then increase to 150mg daily  -start PROPRANOLOL 10-20mg TID PRN for anxiety/panic  -continue ALPRAZOLAM 0.5mg daily if needed for panic   -ok to start MAGNESIUM GLYCINATE 200mg at bedtime x 7 nights; ok to increase to 400mg nightly if needed for sleep quality and anxiety  -hold HYDROXYZINE for now       []Medication refilled per  Medication Refill in Ambulatory Care  policy.  [x]Medication unable to be refilled by RN due to criteria not met as indicated below:    []Eligibility - not seen in the last year   []Supervision - no future appointment   []Compliance - no shows, cancellations or lapse in therapy   []Verification - order discrepancy   [x]Controlled medication   []Medication not included in policy   []90-day supply request   []Other

## 2023-07-22 ENCOUNTER — HEALTH MAINTENANCE LETTER (OUTPATIENT)
Age: 38
End: 2023-07-22

## 2023-07-28 ENCOUNTER — VIRTUAL VISIT (OUTPATIENT)
Dept: PSYCHIATRY | Facility: CLINIC | Age: 38
End: 2023-07-28
Payer: COMMERCIAL

## 2023-07-28 DIAGNOSIS — F41.1 GENERALIZED ANXIETY DISORDER: ICD-10-CM

## 2023-07-28 DIAGNOSIS — F33.1 MODERATE EPISODE OF RECURRENT MAJOR DEPRESSIVE DISORDER (H): ICD-10-CM

## 2023-07-28 PROCEDURE — 99214 OFFICE O/P EST MOD 30 MIN: CPT | Mod: VID | Performed by: STUDENT IN AN ORGANIZED HEALTH CARE EDUCATION/TRAINING PROGRAM

## 2023-07-28 RX ORDER — VENLAFAXINE HYDROCHLORIDE 150 MG/1
150 CAPSULE, EXTENDED RELEASE ORAL DAILY
Qty: 30 CAPSULE | Refills: 1 | Status: SHIPPED | OUTPATIENT
Start: 2023-07-28 | End: 2023-10-17

## 2023-07-28 RX ORDER — GABAPENTIN 100 MG/1
100 CAPSULE ORAL 3 TIMES DAILY PRN
Qty: 90 CAPSULE | Refills: 1 | Status: SHIPPED | OUTPATIENT
Start: 2023-07-28 | End: 2023-10-17

## 2023-07-28 NOTE — NURSING NOTE
Is the patient currently in the state of [place]? YES    Visit mode:VIDEO    If the visit is dropped, the patient can be reconnected by: VIDEO VISIT: Text to cell phone: [phone]    Will anyone else be joining the visit? NO      How would you like to obtain your AVS? MyChart    Are changes needed to the allergy or medication list? NO    Reason for visit: CYDNEY Torres VF

## 2023-07-28 NOTE — PATIENT INSTRUCTIONS
Maynor Fernandez,    Thank you for our time together today in Collaborative Care Psychiatry Service (CCPS). [facility] provides brief psychiatric medication stabilization to patients referred by their Primary Care Providers. Patients are typically seen in CCPS for up to 4 appointments and then referred back to the PCP for ongoing refills unless longer term medication management by a specialist is indicated. If I believe you will benefit from long-term psychiatric care I will discuss this with you. If you are interested in seeing a psychiatrist or psychiatric nurse practitioner long-term, please send me a message in [employer] so we can refer you appropriately.     Treatment Plan:  MEDICATIONS:   -continue VENLAFAXINE XR 150mg daily; consider increasing to 225mg if appropriate  -stop PROPRANOLOL 10-20mg TID PRN for anxiety/panic  -continue ALPRAZOLAM 0.5mg daily if needed for panic   -start GABAPENTIN 100mg up to three times daily if needed for anxiety  SUPPLEMENTS  -continue MAGNESIUM GLYCINATE 300mg at bedtime for anxiety and sleep  -start INOSITOL 500mg once or twice daily for anxiety; OK to increase by 500mg daily if needed  Look at [employer]  Follow up in 4-6 weeks or sooner if needed with other [facility] provider. You will be called to schedule this appointment.  You can call [phone] to make appointments, leave a message with our nursing team, and inquire about any mental health referrals I have placed.  Please call your pharmacy to request a refill of your medicines listed above if needed between appointments.     IGOR Pierre  Collaborative Care Psychiatry Service  [facility]   Patient Education   [facility] Care Psychiatry Service  What to Expect  Here's what to expect from your Collaborative Care Psychiatry Service (CCPS).   About CCPS  CCPS means 2 people work together to help you get better. You'll meet with a behavioral health clinician and a psychiatric doctor. A behavioral health  "clinician helps people with mental health problems by talking with them. A psychiatric doctor helps people by giving them medicine.  How it works  At every visit, you'll see the behavioral health clinician (BHC) first. They'll talk with you about how you're doing and teach you how to feel better.   Then you'll see the psychiatric doctor. This doctor can help you deal with troubling thoughts and feelings by giving you medicine. They'll make sure you know the plan for your care.   CCPS usually takes 3 to 6 visits. If you need more visits, we may have you start seeing a different psychiatric doctor for ongoing care.  If you have any questions or concerns, we'll be glad to talk with you.  About visits  Be open  At your visits, please talk openly about your problems. It may feel hard, but it's the best way for us to help you.  Cancelling visits  If you can't come to your visit, please call us right away at [phone]. If you don't cancel at least 24 hours (1 full day) before your visit, that's \"late cancellation.\"  Being late to visits  Being very late is the same as not showing up. You will be a \"no show\" if:  Your appointment starts with a BHC, and you're more than 15 minutes late for a 30-minute (half hour) visit. This will also cancel your appointment with the psychiatric doctor.  Your appointment is with a psychiatric doctor only, and you're more than 15 minutes late for a 30-minute (half hour) visit.  Your appointment is with a psychiatric doctor only, and you're more than 30 minutes late for a 60-minute (full hour) visit.  If you cancel late or don't show up 2 times within 6 months, we may end your care.   Getting help between visits  If you need help between visits, you can call us Monday to Friday from 8 a.m. to 4:30 p.m. at [phone].  Emergency care  Call 911 or go to the nearest emergency department if your life or someone else's life is in danger.  Call 458 anytime to reach the national Suicide and " Crisis hotline.  Medicine refills  To refill your medicine, call your pharmacy. You can also call [facility]'s Behavioral Access at [phone], Monday to Friday, 8 a.m. to 4:30 p.m. It can take 1 to 3 business days to get a refill.   Forms, letters, and tests  You may have papers to fill out, like FMLA, short-term disability, and workability. We can help you with these forms at your visits, but you must have an appointment. You may need more than 1 visit for this, to be in an intensive therapy program, or both.  Before we can give you medicine for ADHD, we may refer you to get tested for it or confirm it another way.  We may not be able to give you an emotional support animal letter.  We don't do mental health checks ordered by the court.   We don't do mental health testing, but we can refer you to get tested.   Thank you for choosing us for your care.  For informational purposes only. Not to replace the advice of your health care provider. Copyright   2022 [facility]. All rights reserved. [employer] [id number] - 12/22.

## 2023-07-28 NOTE — PROGRESS NOTES
Virtual Visit Details    Type of service:  Video Visit     Originating Location (pt. Location): Home    Distant Location (provider location):  Off-site  Platform used for Video Visit: Teads    ADMINISTRATIVE BILLING:    Time spent interviewing patient, reviewing referral documents, obtaining and reviewing outside records, communication with other health specialists, and preparing this report on today's date  Video start: 840  Video stop: 909    Total time: 38 mins      [facility] PSYCHIATRIC SERVICE FOLLOW UP     Name:  Rebecca Fischer  : 1985     Telemedicine Visit: The patient's condition can be safely assessed and treated via synchronous audio and visual telemedicine encounter.      Reason for Telemedicine Visit: COVID 19 pandemic and the social and physical recommendations by the CDC and MD. and Patient has requested telehealth visit      Consent:  The patient/guardian has verbally consented to: the potential risks and benefits of telemedicine (video visit or phone) versus in person care; bill my insurance or make self-payment for services provided; and responsibility for payment of non-covered services.     As the provider I attest to compliance with applicable laws and regulations related to telemedicine.    IDENTIFICATION     Patient is a 37 year old year old White, female  who presents for follow-up medication management with [facility].  Patient was initially referred by their PCP. Patient attended the session alone.   The [facility] psychiatry providers act as a specialty service for Primary Care Providers in the [facility] who seek to optimize medications for unstable patients.  Once medications have been optimized, [facility] providers discharge the patient back to the referring Primary Care Provider for ongoing medication management.  This type of system allows [facility] to serve a high volume of patients.      Patient care team: Patient Care Team:  [facility] as  "PCP - General  No Ref-Primary, Physician  Saadia Adan PA-C as Assigned PCP  Yanni Wang APRN CNP as Assigned Surgical Provider  Steven Bellamy DO as Assigned Musculoskeletal Provider  Stefanie Sheridan NP as Assigned Neuroscience Provider    INTERIM HISTORY   Pt was last seen in [facility] for intake on 10 May 23. At that time, the plan included   -start VENLAFAXINE XR 37.5mg x 7 days then increase to 75mg daily x 7 days then increase to 112.5mg x 7 days then increase to 150mg daily  -start PROPRANOLOL 10-20mg TID PRN for anxiety/panic  -continue ALPRAZOLAM 0.5mg daily if needed for panic   -ok to start MAGNESIUM GLYCINATE 200mg at bedtime x 7 nights; ok to increase to 400mg nightly if needed for sleep quality and anxiety  -hold HYDROXYZINE for now.    Interim pt communication:  none    Available records were reviewed prior to visit.    HISTORY OF PRESENT ILLNESS     Currently: Pt is busy with kids being out of school, working, and being in school. \"I feel like the medicine has been helping.\" Pt doesn't feel \"as down\" on a day to day basis. \"I feel less burdened.\" She still c/o problems with anxious worry and trouble sleeping but \"I feel like I can get more stuff done and I don't feel as down.\"    Suicidal ideation:  No   PHQ2 score is 2 indicating minimal depression.  GAD7 score is  is 18 indicating severe anxiety.    Sleep: Pt has been taking magnesium glycinate nightly and perceives this is helpful. She does continue to use trazodone nightly if needed/less frequently.  Appetite: denies problems with appetite changes. Has been working out more often.    Medications: Pt thinks she's gained about 10# since starting venlafaxine. Pt didn't notice any improvement in anxiety with propranolol. She has stopped taking it regularly because of limited perceived effect.   Alprazolam use varies depending on the week. She probably uses it 1-2x/weekly for panic.     Medical: No new medical conditions. " "    SUBSTANCE USE HISTORY    Tobacco use: none  Caffeine:  Yes  1-2c coffee/tea \"most days\"  Current alcohol:  thinks 1x/week with 2-4 drinks/sitting  Current substance use: denies  Past use alcohol/substance use: cannabis last in 2010    MEDICATIONS                                                                                              Current medications reviewed today and are noted below.   Current psychotropic medications:   VENLAFAXINE XR 150mg daily  PROPRANOLOL 10-20mg TID PRN for anxiety/panic; didn't notice a difference  ALPRAZOLAM 0.5mg daily if needed for panic     Past psychotropic medications:  Hydroxyzine - ineffective  Bupropion - ineffective; tried in HS as well  Escitalopram - reports gaining 50#  Sertraline - ineffective     Supplements:   MAGNESIUM GLYCINATE       7/18/23 Alprazolam 0.5mg #8  6/20/23 Alprazolam 0.5mg #8    Current Outpatient Medications   Medication Sig    ALPRAZolam (XANAX) 0.5 MG tablet Take 1 tablet (0.5 mg) by mouth daily as needed for anxiety Don't mix with alcohol. No early refills.    ECHINACEA EXTRACT PO     levonorgestrel (MIRENA) 20 MCG/24HR IUD 1 each (20 mcg) by Intrauterine route once    Multiple Vitamins-Minerals (EMERGEN-C IMMUNE PO)     Prenatal Vit-Fe Fumarate-FA (PRENATAL VITAMIN AND MINERAL PO)     propranolol (INDERAL) 10 MG tablet Take 1-2 tablets (10-20 mg) by mouth 3 times daily as needed (anxiety)    traZODone (DESYREL) 50 MG tablet Take 1 tablet (50 mg) by mouth nightly as needed for sleep    venlafaxine (EFFEXOR XR) 150 MG 24 hr capsule Take 1 capsule (150 mg) by mouth daily for 60 days    naloxone (NARCAN) 4 MG/0.1ML nasal spray Spray 1 spray (4 mg) into one nostril alternating nostrils as needed for opioid reversal every 2-3 minutes until assistance arrives (Patient not taking: Reported on 7/28/2023)     No current facility-administered medications for this visit.        VITALS   There were no vitals taken for this visit.    Pulse Readings from " "Last 5 Encounters:   02/18/23 78   02/07/23 92   05/31/22 86   05/02/22 104   12/29/20 98     Wt Readings from Last 5 Encounters:   02/24/23 83.5 kg (184 lb)   02/07/23 83.5 kg (184 lb)   05/31/22 69 kg (152 lb 0.2 oz)   05/02/22 67.1 kg (148 lb 0.5 oz)   12/29/20 78.1 kg (172 lb 3.2 oz)     BP Readings from Last 5 Encounters:   02/24/23 120/75   02/18/23 122/80   02/07/23 120/73   05/31/22 130/88   05/02/22 130/76       LABS & IMAGING                                                                                                                Recent available labs reviewed today.    Recent Labs   Lab Test 12/09/22 0919   CR 0.95   GFRESTIMATED 80     Recent Labs   Lab Test 12/09/22 0919   AST 20   ALT 16   ALKPHOS 42     Recent Labs   Lab Test 12/09/22 0919   TSH 1.58   T4 1.04       ALLERGY & IMMUNIZATIONS     No Known Allergies    MEDICAL & SURGICAL HISTORY    Reviewed past medical and surgical history today.   Pregnant - Yes and No: No     Past Medical History:   Diagnosis Date    Anemia     on supplemental iron    NO ACTIVE PROBLEMS        MENTAL STATUS EXAM:     Alertness: alert  and oriented  Appearance: adequately groomed  Behavior/Demeanor: cooperative, pleasant and calm, with good eye contact   Speech: normal and regular rate and rhythm  Language: intact and no problems  Psychomotor: normal or unremarkable  Mood: \"ok\"  Affect: appropriate; was congruent to mood; was congruent to content  Thought Process/Associations: unremarkable  Thought Content:  Reports none;  Denies suicidal ideation, violent ideation and delusions  Perception:  Reports none;  Denies auditory hallucinations and visual hallucinations  Insight: intact  Judgment: intact  Cognition: does  appear grossly intact; formal cognitive testing was not done  Gait and Station: MARTIN video appt     RISK AND PROTECTIVE FACTORS     Static Risk Factors: history of MH diagnoses and/or treatment  Firearms/Weapons Access: No: Patient denies  Dynamic Risk " Factors: emotional distress, substance use, withdrawing from friends/family, anxiety, relationship problems, mental health stigma, feelings of shame and work related problems     Protective Factors: hope for the future, compliance with medication, medical compliance, insight, problem solving ability, future oriented, restricted access to means, access to care as needed, motivation and readiness for change, reasons for living, effective coping strategies and displaying help seeking behavior     SAFETY ASSESSMENT      Based on review of above risk and protective factors and today's exam, pt is at low elevated risk of harm to self or others. She does not meet criteria for a 72 hr hold and remains appropriate for ongoing outpatient care. The patient convincingly denies suicidality today. There was no deceit detected, and the patient presented in a manner that was believable. Local community safety resources printed and reviewed for patient to use if needed.     Recommended that patient call 911 or go to the local ED should there be a change in any of these risk factors.     DSM 5 DIAGNOSIS      296.32 (F33.1) Major Depressive Disorder, Recurrent Episode, Moderate _  300.02 (F41.1) Generalized Anxiety Disorder     DIFFERENTIAL DIAGNOSIS: r/o panic     Medical comorbidities impacting or contributing to clinical picture: None noted  Known issue that I take into account for their medical decisions, no current exacerbations or new concerns.     ASSESSMENT AND PLAN       ASSESSMENT:  Rebecca Fischer is a 37 year old White, female who presents for initial visit with [facility] ([facility]) for medication management.   10 May 23: Pt with hx of anxiety and depression presents for eval/tx of her anxiety, though her affect and reports of sx indicate significant depression during exam today as well. She is in a period of high stress with multiple psychosocial factors: large blended family, marital problems,  working, graduate studies. We discussed her limited improvement with two SSRI and NDRI trials. She is concerned about losing alprazolam as a rescue medicine for panic. I advised SNRI trial for mood/anxiety foundational support with addition of propranolol PRN for physical sx of anxiety and alprazolam as second line for panic. She understands and agrees with plan after we review r/b/se. She asks about supps for anxiety and depression today and I did review briefly several supps with evidence for anxiety but advised against adding several supps until we can re-eval effects of these pharm changes. I did recommend adding mag glycinate at bedtime for anxiety and sleep quality; she understand and agrees to this recommendation. Pt denies SI/safety and will return in 4 weeks. Finances/copay is a limiting factor.   Today 28 July 23: Pt with hx of anxiety and depression returns to clinic after missing June visit reporting improving anxiety since starting venlafaxine. She has noticed weight gain of 10# today but denies other SE or concerns like HTN, headache, sweating. We discussed continuing as is for now and watching weight. She c/o anxiety somewhat managed but not quite. We discussed difficulty finding PRN anxiolytic after she has tried/found alprazolam PRN helpful. Discussed PRN use and recommended no etoh with alprazolam. Suggested gabapentin up to TID PRN for anxiety and reviewed r/b/se, jenna when used with other sedating agents (advised to abstain from etoh or xanax with gabapentin). Pt agrees to trial. Would consider increasing venlafaxine if weight remains stable vs switch to other SSRI.   Discussed integrative therapies including inositol for anxiety and reviewed r/b/se including GI upset and diarrhea. Pt can find and trial this OTC.   [facility] care while I transition out of this role. Pt agrees. No safety concerns.      TREATMENT PLAN: Medication side effects and alternatives reviewed.  Health promotion activities recommended and reviewed. All questions addressed. Education and counseling completed regarding risks and benefits of medications and psychotherapy options. Collaborative Care Psychiatry Service model reviewed today. Recommend therapy for additional support. Safety plan reviewed as indicated.     MEDICATIONS:   -continue VENLAFAXINE XR 150mg daily; consider increasing to 225mg if appropriate  -stop PROPRANOLOL 10-20mg TID PRN for anxiety/panic  -continue ALPRAZOLAM 0.5mg daily if needed for panic   -start GABAPENTIN 100mg up to three times daily if needed for anxiety    SUPPLEMENTS  -continue MAGNESIUM GLYCINATE 300mg at bedtime for anxiety and sleep  -start INOSITOL 500mg once or twice daily for anxiety; OK to increase by 500mg daily if needed    LABS/RADS:   -None at this time    PATIENT STATUS:  [facility] MD/DO/NP/PA providers offer care a specialty service for Primary Care Providers in the [facility] that seek to optimize psychotropic medications for unstable patients.  Once medications have been optimized, our providers discharge the patient back to the referring Primary Care Provider for ongoing medication management.  This type of system allows our providers to serve a high volume of patients.   - transfer within [facility]    PSYCHOSOCIAL:   -Follow up with primary care provider as planned or for acute medical concerns.    PSYCHOEDUCATION:  Medication side effects and alternatives reviewed. Health promotion activities recommended and reviewed today. All questions addressed. Education and counseling completed regarding risks and benefits of medications and psychotherapy options.  Consent provided by patient/guardian  Call the psychiatric nurse line with medication questions or concerns at [phone].  MyChart may be used to communicate with your provider, but this is not intended to be used for emergencies.  BLACK BOX WARNING: Discussed the Food and Drug Administration (FDA) requires  that all antidepressants carry a warning that some children, adolescents and young adults may be at increased risk of suicide when taking antidepressants. Anyone taking an antidepressant should be watched closely for worsening depression or unusual behavior especially in the first few weeks after starting an SSRI. Keep in mind, antidepressants are more likely to reduce suicide risk in the long run by improving mood.   BENZODIAZEPINE:  discussion on how benzos work and the need to use them short term due to potential of anxiety getting.  This is a controlled substance with risk for abuse, need to keep in a safe keep place and cannot replace lost scripts.    Medlineplus.gov is information for patients.  It is run by the [employer] and it contains information about all disorders, diseases and all medications.      FOLLOW-UP: Follow up in 4-6 weeks    Continue all other treatments (including medications) per primary care provider and/or specialists.   To schedule individual or family therapy, call [facility] at [phone].   Follow up with primary care provider as planned or for acute medical concerns.  Call the psychiatric nurse line with medication questions or concerns at [phone] or [phone].  ChoiceMap may be used to communicate with your care team, but this is not intended to be used for emergencies.    CRISIS RESOURCES:    Present to the Emergency Department as needed or call after hours crisis line at [phone] or [phone].   [place] Crisis Text Line: Text [place] to 513681.  Suicide LifeLine Chat: suicidepreventionlifeline.org/chat/.  National Suicide Prevention Lifeline: 326.810.7082 (TTY: 472.901.3510). Call anytime for help.  (www.suicidepreventionlifeline.org)  National [place] on Mental Illness (www.gideon.org): 812.490.5462 or 058-130-2561.  Mental Health Association (www.mentalhealth.org): 164.560.6659 or 861-811-8117.      Signed:   Tan Sheridan DNP,  [employer]  Coll[facility]ative Care Psychiatry Service ([facility])

## 2023-08-18 DIAGNOSIS — G47.00 INSOMNIA, UNSPECIFIED TYPE: ICD-10-CM

## 2023-08-18 RX ORDER — TRAZODONE HYDROCHLORIDE 50 MG/1
50 TABLET, FILM COATED ORAL
Qty: 60 TABLET | Refills: 0 | Status: SHIPPED | OUTPATIENT
Start: 2023-08-18 | End: 2023-08-19

## 2023-08-19 ENCOUNTER — MYC REFILL (OUTPATIENT)
Dept: FAMILY MEDICINE | Facility: CLINIC | Age: 38
End: 2023-08-19
Payer: COMMERCIAL

## 2023-08-19 DIAGNOSIS — G47.00 INSOMNIA, UNSPECIFIED TYPE: ICD-10-CM

## 2023-08-21 DIAGNOSIS — F41.1 GENERALIZED ANXIETY DISORDER: ICD-10-CM

## 2023-08-21 RX ORDER — TRAZODONE HYDROCHLORIDE 50 MG/1
50 TABLET, FILM COATED ORAL
Qty: 60 TABLET | Refills: 0 | Status: SHIPPED | OUTPATIENT
Start: 2023-08-21 | End: 2023-10-04

## 2023-08-21 NOTE — TELEPHONE ENCOUNTER
Behavioral Access, please schedule this patient for a future visit with  Stefanie Sheridan NP . Patient is requesting a refill.     Date of Last Office Visit: 07/28/2023  Date of Next Office Visit: None. Scheduling attempting to contact pt  No shows since last visit: None  Cancellations since last visit: None    Medication requested: ALPRAZolam (XANAX) 0.5 MG tablet  Date last ordered: 07/18/2023 Qty: 8 tablet Refills: 07/18/2023     [place] of [place] ?: not providers delegate     Lapse in medication adherence greater than 5 days?: no  If yes, call patient and gather details: n/a  Medication refill request verified as identical to current order?: yes  Result of Last DAM, VPA, Li+ Level, CBC, or Carbamazepine Level (at or since last visit): N/A    Last visit treatment plan:     TREATMENT PLAN: Medication side effects and alternatives reviewed. Health promotion activities recommended and reviewed. All questions addressed. Education and counseling completed regarding risks and benefits of medications and psychotherapy options. Collaborative Care Psychiatry Service model reviewed today. Recommend therapy for additional support. Safety plan reviewed as indicated.     MEDICATIONS:   -continue VENLAFAXINE XR 150mg daily; consider increasing to 225mg if appropriate  -stop PROPRANOLOL 10-20mg TID PRN for anxiety/panic  -continue ALPRAZOLAM 0.5mg daily if needed for panic   -start GABAPENTIN 100mg up to three times daily if needed for anxiety    []Medication refilled per  Medication Refill in Ambulatory Care  policy.  [x]Medication unable to be refilled by RN due to criteria not met as indicated below:    []Eligibility - not seen in the last year   [x]Supervision - no future appointment   []Compliance - no shows, cancellations or lapse in therapy   []Verification - order discrepancy   []Controlled medication   []Medication not included in policy   []90-day supply request   [x]Other- MA processed    Horacio Madsen MA on 8/21/2023  at 1:36 PM

## 2023-09-09 ENCOUNTER — MYC REFILL (OUTPATIENT)
Dept: FAMILY MEDICINE | Facility: CLINIC | Age: 38
End: 2023-09-09
Payer: COMMERCIAL

## 2023-09-09 ENCOUNTER — MYC REFILL (OUTPATIENT)
Dept: PSYCHIATRY | Facility: CLINIC | Age: 38
End: 2023-09-09
Payer: COMMERCIAL

## 2023-09-09 DIAGNOSIS — G47.00 INSOMNIA, UNSPECIFIED TYPE: ICD-10-CM

## 2023-09-09 DIAGNOSIS — F41.1 GENERALIZED ANXIETY DISORDER: ICD-10-CM

## 2023-09-11 RX ORDER — TRAZODONE HYDROCHLORIDE 50 MG/1
50 TABLET, FILM COATED ORAL
Qty: 60 TABLET | Refills: 0 | OUTPATIENT
Start: 2023-09-11

## 2023-09-11 RX ORDER — ALPRAZOLAM 0.5 MG
0.5 TABLET ORAL DAILY PRN
Qty: 8 TABLET | Refills: 0 | Status: SHIPPED | OUTPATIENT
Start: 2023-09-11 | End: 2023-10-17

## 2023-09-11 NOTE — TELEPHONE ENCOUNTER
Date of Last Office Visit: 07/28/2023  Date of Next Office Visit: 10/17/2023 - With Dr. Bose  No shows since last visit: N/A  Cancellations since last visit: 9/26/2023 with Dr. Bose was moved to 10/17/2023    This patient was a Tan Sheridan patient.     Medication requested: ALPRAZolam (XANAX) 0.5 MG tablet  Date last ordered: 07/18/2023 Qty: 8 Refills: 0     [place] of [place] ?: yes  Medication last sold date: 07/24/2023 Qty filled: 8  Other controlled substance on [place] ?: No  If yes, is this a new medication?: N/A  If yes, name of medication: N/A and date filled: N/A    Lapse in medication adherence greater than 5 days?: No  If yes, call patient and gather details: N/A  Medication refill request verified as identical to current order?: Yes  Result of Last DAM, VPA, Li+ Level, CBC, or Carbamazepine Level (at or since last visit): N/A    Last visit treatment plan:     MEDICATIONS:   -continue VENLAFAXINE XR 150mg daily; consider increasing to 225mg if appropriate  -stop PROPRANOLOL 10-20mg TID PRN for anxiety/panic  -continue ALPRAZOLAM 0.5mg daily if needed for panic   -start GABAPENTIN 100mg up to three times daily if needed for anxiety    FOLLOW-UP: Follow up in 4-6 weeks      From 7/28/2023 Medical Advice Follow up in 4-6 weeks or sooner if needed with other [facility] provider.     []Medication refilled per  Medication Refill in Ambulatory Care  policy.  [x]Medication unable to be refilled by RN due to criteria not met as indicated below:    []Eligibility - not seen in the last year   []Supervision - no future appointment   []Compliance - no shows, cancellations or lapse in therapy   []Verification - order discrepancy   [x]Controlled medication   []Medication not included in policy   []90-day supply request   []Other

## 2023-10-04 ENCOUNTER — MYC REFILL (OUTPATIENT)
Dept: FAMILY MEDICINE | Facility: CLINIC | Age: 38
End: 2023-10-04
Payer: COMMERCIAL

## 2023-10-04 ENCOUNTER — MYC REFILL (OUTPATIENT)
Dept: PSYCHIATRY | Facility: CLINIC | Age: 38
End: 2023-10-04
Payer: COMMERCIAL

## 2023-10-04 DIAGNOSIS — F41.1 GENERALIZED ANXIETY DISORDER: ICD-10-CM

## 2023-10-04 DIAGNOSIS — G47.00 INSOMNIA, UNSPECIFIED TYPE: ICD-10-CM

## 2023-10-05 RX ORDER — ALPRAZOLAM 0.5 MG
0.5 TABLET ORAL DAILY PRN
Qty: 8 TABLET | Refills: 0 | OUTPATIENT
Start: 2023-10-05

## 2023-10-05 RX ORDER — TRAZODONE HYDROCHLORIDE 50 MG/1
50 TABLET, FILM COATED ORAL
Qty: 60 TABLET | Refills: 0 | Status: SHIPPED | OUTPATIENT
Start: 2023-10-05 | End: 2023-11-11

## 2023-10-05 NOTE — TELEPHONE ENCOUNTER
RN received a refill request from [facility] for Xanax 0.5 mg tablet  This refill request was DENIED for the following reasons:    1.  Too early to fill.  Patient has a refill available from 9/11/23 prescription.  RN phoned [facility] Pharmacist who verified the patient had not picked up the 9/11/23 refill.      2.  RN notified the patient to call her [facility] regarding this prescription.      Pio Aguirre RN on 10/5/2023 at 10:10 AM

## 2023-10-10 ASSESSMENT — ANXIETY QUESTIONNAIRES
GAD7 TOTAL SCORE: 21
IF YOU CHECKED OFF ANY PROBLEMS ON THIS QUESTIONNAIRE, HOW DIFFICULT HAVE THESE PROBLEMS MADE IT FOR YOU TO DO YOUR WORK, TAKE CARE OF THINGS AT HOME, OR GET ALONG WITH OTHER PEOPLE: EXTREMELY DIFFICULT
1. FEELING NERVOUS, ANXIOUS, OR ON EDGE: NEARLY EVERY DAY
6. BECOMING EASILY ANNOYED OR IRRITABLE: NEARLY EVERY DAY
7. FEELING AFRAID AS IF SOMETHING AWFUL MIGHT HAPPEN: NEARLY EVERY DAY
5. BEING SO RESTLESS THAT IT IS HARD TO SIT STILL: NEARLY EVERY DAY
3. WORRYING TOO MUCH ABOUT DIFFERENT THINGS: NEARLY EVERY DAY
2. NOT BEING ABLE TO STOP OR CONTROL WORRYING: NEARLY EVERY DAY
GAD7 TOTAL SCORE: 21
4. TROUBLE RELAXING: NEARLY EVERY DAY

## 2023-10-17 ENCOUNTER — VIRTUAL VISIT (OUTPATIENT)
Dept: PSYCHIATRY | Facility: CLINIC | Age: 38
End: 2023-10-17
Payer: COMMERCIAL

## 2023-10-17 DIAGNOSIS — F41.1 GENERALIZED ANXIETY DISORDER: Primary | ICD-10-CM

## 2023-10-17 DIAGNOSIS — F33.1 MODERATE EPISODE OF RECURRENT MAJOR DEPRESSIVE DISORDER (H): ICD-10-CM

## 2023-10-17 PROCEDURE — 99214 OFFICE O/P EST MOD 30 MIN: CPT | Mod: VID | Performed by: PSYCHIATRY & NEUROLOGY

## 2023-10-17 RX ORDER — VENLAFAXINE HYDROCHLORIDE 150 MG/1
150 CAPSULE, EXTENDED RELEASE ORAL DAILY
Qty: 30 CAPSULE | Refills: 3 | Status: SHIPPED | OUTPATIENT
Start: 2023-10-17 | End: 2024-03-15

## 2023-10-17 RX ORDER — ALPRAZOLAM 0.5 MG
0.5 TABLET ORAL DAILY PRN
Qty: 10 TABLET | Refills: 3 | Status: SHIPPED | OUTPATIENT
Start: 2023-10-17 | End: 2024-04-10

## 2023-10-17 RX ORDER — VENLAFAXINE HYDROCHLORIDE 37.5 MG/1
37.5 CAPSULE, EXTENDED RELEASE ORAL DAILY
Qty: 30 CAPSULE | Refills: 3 | Status: SHIPPED | OUTPATIENT
Start: 2023-10-17 | End: 2024-05-24

## 2023-10-17 NOTE — Clinical Note
Milla,  This patient may want to see you regularly for therapy in between our usual combined appointments.   Martin
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
## 2023-10-17 NOTE — Clinical Note
Please call pt to schedule followup with Milla Hendrix first 30 minutes and then myself. Patient may opt to see Milla sooner than said visit and this was would be separate visit from above. Thank you!  Sincerely, Martin Bose M.D. Consultative Psychiatrist Program Medical Director, Lead [facility] Psychiatry Service

## 2023-10-17 NOTE — NURSING NOTE
Is the patient currently in the state of [place]? YES    Visit mode:VIDEO    If the visit is dropped, the patient can be reconnected by: VIDEO VISIT: Text to cell phone:   Telephone Information:   Mobile [phone]       Will anyone else be joining the visit? NO  (If patient encounters technical issues they should call [phone]    How would you like to obtain your AVS? MyChart    Are changes needed to the allergy or medication list? Pt stated no changes to allergies and Pt stated no med changes    Reason for visit: CYDNEY ROJAS

## 2023-10-17 NOTE — PATIENT INSTRUCTIONS
"Patient Education   [facility] Psychiatry Service  Milla Simeon YESSYSIS is the therapist who is part of our team for holistic care. You may choose to see her more regularly     What to Expect  Here's what to expect from your Collaborative Care Psychiatry Service (CCPS).   About CCPS  CCPS means 2 people work together to help you get better. You'll meet with a behavioral health clinician and a psychiatric doctor. A behavioral health clinician helps people with mental health problems by talking with them. A psychiatric doctor helps people by giving them medicine.  How it works  At every visit, you'll see the behavioral health clinician (BHC) first. They'll talk with you about how you're doing and teach you how to feel better.   Then you'll see the psychiatric doctor. This doctor can help you deal with troubling thoughts and feelings by giving you medicine. They'll make sure you know the plan for your care.   CCPS usually takes 3 to 6 visits. If you need more visits, we may have you start seeing a different psychiatric doctor for ongoing care.  If you have any questions or concerns, we'll be glad to talk with you.  About visits  Be open  At your visits, please talk openly about your problems. It may feel hard, but it's the best way for us to help you.  Cancelling visits  If you can't come to your visit, please call us right away at [phone]. If you don't cancel at least 24 hours (1 full day) before your visit, that's \"late cancellation.\"  Being late to visits  Being very late is the same as not showing up. You will be a \"no show\" if:  Your appointment starts with a BHC, and you're more than 15 minutes late for a 30-minute (half hour) visit. This will also cancel your appointment with the psychiatric doctor.  Your appointment is with a psychiatric doctor only, and you're more than 15 minutes late for a 30-minute (half hour) visit.  Your appointment is with a psychiatric doctor only, and you're more than " 30 minutes late for a 60-minute (full hour) visit.  If you cancel late or don't show up 2 times within 6 months, we may end your care.   Getting help between visits  If you need help between visits, you can call us Monday to Friday from 8 a.m. to 4:30 p.m. at [phone].  Emergency care  Call 911 or go to the nearest emergency department if your life or someone else's life is in danger.  Call 988 anytime to reach the national Suicide and Crisis hotline.  Medicine refills  To refill your medicine, call your pharmacy. You can also call [facility]'s Behavioral Access at [phone], Monday to Friday, 8 a.m. to 4:30 p.m. It can take 1 to 3 business days to get a refill.   Forms, letters, and tests  You may have papers to fill out, like FMLA, short-term disability, and workability. We can help you with these forms at your visits, but you must have an appointment. You may need more than 1 visit for this, to be in an intensive therapy program, or both.  Before we can give you medicine for ADHD, we may refer you to get tested for it or confirm it another way.  We may not be able to give you an emotional support animal letter.  We don't do mental health checks ordered by the court.   We don't do mental health testing, but we can refer you to get tested.   Thank you for choosing us for your care.  For informational purposes only. Not to replace the advice of your health care provider. Copyright   2022 [facility]. All rights reserved. [employer] 536224 - 12/22.

## 2023-10-19 NOTE — PROGRESS NOTES
[facility]  Labor Progress Note    Subjective:  Patient doing well. Not even feeling contractions now w epidural - she states she is very comfortable. She did SROM, clear fluid at 0405    Objective:   Patient Vitals for the past 4 hrs:   BP Temp Temp src Resp SpO2   20 0430 100/52 98.3  F (36.8  C) Oral 18 99 %   20 0355 112/64 -- -- 18 100 %   20 0348 107/66 -- -- 18 100 %   20 0345 110/68 -- -- 18 100 %   20 0339 114/63 98.2  F (36.8  C) Oral 18 100 %   20 0333 117/73 -- -- 18 100 %   20 0330 117/74 -- -- 18 100 %   20 0326 119/73 -- -- 18 100 %   20 0324 115/76 -- -- 18 100 %   20 0322 112/73 -- -- 18 100 %   20 0320 110/75 -- -- 18 100 %     SVE: 8.5/90/-1 per bedside RN    FHT: Baseline 140, moderate variability, present accelerations, occasional early and late decelerations  [place]: 3-5 contractions in 10 minutes    Assessment/Plan:  Ms. Rebecca Fischer is a 34 year old , at 39w3d by LMP c/w 9w1d US, who presents for elective IOL. Pregnancy complicated by GBS positive status.     #IOL  - S/p PV miso x2; s/p SROM 0405 clear fluid; laboring on her own, add pitocin PRN   - GBS positive, PCN running    - Pain: epidural w good pain control   - Anticipate      #FWB  - Cat II FHT reassured by moderate variability and patient has made rapid cervical change and ruptured - repositioning and fluid bolus helps.   - Continuous monitoring    MARY Cullen MD  Obstetrics and Gyncology, PGY-2  Ob-Gyn PGY-2 Pager: [phone]   20 4:42 AM        none

## 2023-10-30 RX ORDER — ALPRAZOLAM 0.5 MG
0.5 TABLET ORAL DAILY PRN
Qty: 8 TABLET | Refills: 0 | OUTPATIENT
Start: 2023-10-30

## 2023-10-30 NOTE — TELEPHONE ENCOUNTER
Request for Xanax 0.5 mg from 8/21/23 encounter in providers inbox. This was filled recently on 10/17 for #10 and 3 refills.     Will refuse this encounter.     Lakeisha Bernardo RN on 10/30/2023 at 12:34 PM

## 2023-10-30 NOTE — TELEPHONE ENCOUNTER
This is from 2 months ago.  I just want to make sure this is accurate for showing up in the provider inbox.      Carmen Szymanski, DNP, APRN, FMHNP-BC,

## 2023-11-11 ENCOUNTER — MYC REFILL (OUTPATIENT)
Dept: FAMILY MEDICINE | Facility: CLINIC | Age: 38
End: 2023-11-11
Payer: COMMERCIAL

## 2023-11-11 ENCOUNTER — MYC REFILL (OUTPATIENT)
Dept: PSYCHIATRY | Facility: CLINIC | Age: 38
End: 2023-11-11
Payer: COMMERCIAL

## 2023-11-11 DIAGNOSIS — G47.00 INSOMNIA, UNSPECIFIED TYPE: ICD-10-CM

## 2023-11-11 DIAGNOSIS — F41.1 GENERALIZED ANXIETY DISORDER: ICD-10-CM

## 2023-11-13 RX ORDER — TRAZODONE HYDROCHLORIDE 50 MG/1
50 TABLET, FILM COATED ORAL
Qty: 60 TABLET | Refills: 0 | Status: SHIPPED | OUTPATIENT
Start: 2023-11-13 | End: 2023-11-27

## 2023-11-13 NOTE — TELEPHONE ENCOUNTER
[facility] received faxed refill request for ALPRAZolam (XANAX) 0.5 MG tablet,  and venlafaxine (EFFEXOR XR) 37.5 MG 24 hr capsule      Routing to RN to deny refill these was just filled 10/17/2023 and there are 3 refills on them.    Whit Maguire CMA November 13, 2023

## 2023-11-13 NOTE — TELEPHONE ENCOUNTER
Drug interaction needs provider review, patient has not been on medications >1 year.    Thank you,  Ann Marie Reeves RN

## 2023-11-14 ENCOUNTER — TELEPHONE (OUTPATIENT)
Dept: FAMILY MEDICINE | Facility: CLINIC | Age: 38
End: 2023-11-14
Payer: COMMERCIAL

## 2023-11-14 RX ORDER — ALPRAZOLAM 0.5 MG
0.5 TABLET ORAL DAILY PRN
Qty: 10 TABLET | Refills: 3 | OUTPATIENT
Start: 2023-11-14

## 2023-11-14 RX ORDER — VENLAFAXINE HYDROCHLORIDE 37.5 MG/1
37.5 CAPSULE, EXTENDED RELEASE ORAL DAILY
Qty: 30 CAPSULE | Refills: 3 | OUTPATIENT
Start: 2023-11-14

## 2023-11-14 NOTE — TELEPHONE ENCOUNTER
RN reviewed refill request for:  ALPRAZolam (XANAX) 0.5 MG tablet   venlafaxine (EFFEXOR XR) 37.5 MG 24 hr capsule   venlafaxine (EFFEXOR XR) 150 MG 24 hr capsule     These were ordered on 10/17/23 for 30 day supplies with 3 refills. The refill request is too soon. Will refuse the refill request.       BOGDAN SHER RN on 11/14/2023 at 12:16 PM

## 2023-11-14 NOTE — TELEPHONE ENCOUNTER
Patient Quality Outreach    Patient is due for the following:   Cervical Cancer Screening - PAP Needed  Physical Preventive Adult Physical      Topic Date Due    Hepatitis B Vaccine (1 of 3 - 3-dose series) Never done    Flu Vaccine (1) 09/01/2023    COVID-19 Vaccine (2 - 2023-24 season) 09/01/2023       Next Steps:   Schedule a Adult Preventative    Type of outreach:    Sent [employer] message.      Questions for provider review:    None           Thaddeus Taylor MA

## 2023-11-24 ASSESSMENT — ANXIETY QUESTIONNAIRES
IF YOU CHECKED OFF ANY PROBLEMS ON THIS QUESTIONNAIRE, HOW DIFFICULT HAVE THESE PROBLEMS MADE IT FOR YOU TO DO YOUR WORK, TAKE CARE OF THINGS AT HOME, OR GET ALONG WITH OTHER PEOPLE: VERY DIFFICULT
GAD7 TOTAL SCORE: 18
3. WORRYING TOO MUCH ABOUT DIFFERENT THINGS: NEARLY EVERY DAY
4. TROUBLE RELAXING: NEARLY EVERY DAY
4. TROUBLE RELAXING: NEARLY EVERY DAY
GAD7 TOTAL SCORE: 18
7. FEELING AFRAID AS IF SOMETHING AWFUL MIGHT HAPPEN: MORE THAN HALF THE DAYS
7. FEELING AFRAID AS IF SOMETHING AWFUL MIGHT HAPPEN: MORE THAN HALF THE DAYS
2. NOT BEING ABLE TO STOP OR CONTROL WORRYING: NEARLY EVERY DAY
5. BEING SO RESTLESS THAT IT IS HARD TO SIT STILL: MORE THAN HALF THE DAYS
6. BECOMING EASILY ANNOYED OR IRRITABLE: MORE THAN HALF THE DAYS
IF YOU CHECKED OFF ANY PROBLEMS ON THIS QUESTIONNAIRE, HOW DIFFICULT HAVE THESE PROBLEMS MADE IT FOR YOU TO DO YOUR WORK, TAKE CARE OF THINGS AT HOME, OR GET ALONG WITH OTHER PEOPLE: VERY DIFFICULT
1. FEELING NERVOUS, ANXIOUS, OR ON EDGE: NEARLY EVERY DAY
GAD7 TOTAL SCORE: 18
6. BECOMING EASILY ANNOYED OR IRRITABLE: MORE THAN HALF THE DAYS
2. NOT BEING ABLE TO STOP OR CONTROL WORRYING: NEARLY EVERY DAY
GAD7 TOTAL SCORE: 18
5. BEING SO RESTLESS THAT IT IS HARD TO SIT STILL: MORE THAN HALF THE DAYS
1. FEELING NERVOUS, ANXIOUS, OR ON EDGE: NEARLY EVERY DAY
3. WORRYING TOO MUCH ABOUT DIFFERENT THINGS: NEARLY EVERY DAY

## 2023-11-24 NOTE — PROGRESS NOTES
[facility] Collaborative Care Psychiatry Services [facility]  2023      Behavioral Health Clinician Progress Note    Patient Name: Rebecca Fischer           Service Type:  Individual      Service Location:   MyChart / Email (patient reached)     Session Start Time: 835am  Session End Time: 9am      Session Length: 16 - 37      Attendees: Patient     Service Modality:  Video Visit:      Provider verified identity through the following two step process.  Patient provided:  Patient  and Patient was verified at admission/transfer    Telemedicine Visit: The patient's condition can be safely assessed and treated via synchronous audio and visual telemedicine encounter.      Reason for Telemedicine Visit: Services only offered telehealth    Originating Site (Patient Location): Patient's home    Distant Site (Provider Location): [facility] MENTAL HEALTH & ADDICTION [facility]    Consent:  The patient/guardian has verbally consented to: the potential risks and benefits of telemedicine (video visit) versus in person care; bill my insurance or make self-payment for services provided; and responsibility for payment of non-covered services.     Patient would like the video invitation sent by:  My Chart    Mode of Communication:  Video Conference via [facility]    Distant Location (Provider):  On-site    As the provider I attest to compliance with applicable laws and regulations related to telemedicine.    Visit Activities (Refresh list every visit): [facility] Only    Diagnostic Assessment Date: 5/3/2023 Javed Kimbrough  Treatment Plan Review Date: in the next few visits  See Flowsheets for today's PHQ-9 and BRETT-7 results  Previous PHQ-9:       2023    10:00 AM 2023     6:07 PM 2023    12:18 PM   PHQ-9 SCORE   PHQ-9 Total Score Hai 14 (Moderate depression) 11 (Moderate depression) 11 (Moderate depression)   PHQ-9 Total Score 14 11 11   See PHQ-2.     Previous BRETT-7:       2023     12:19 PM 10/10/2023     9:59 AM 11/24/2023    11:02 AM   BRETT-7 SCORE   Total Score 18 (severe anxiety) 21 (severe anxiety) 18 (severe anxiety)   Total Score 18 21 18    18       IMANI LEVEL:      9/29/2022     7:48 AM   IMANI Score (Last Two)   IMANI Raw Score 36   Activation Score 75.5   IMANI Level 4       DATA  Extended Session (60+ minutes): No  Interactive Complexity: No  Crisis: No  [facility] Patient: No    Treatment Objective(s) Addressed in This Session:  Target Behavior(s):  stay present, reduce worry and improve sleep    Anxiety: will experience a reduction in anxiety, will develop more effective coping skills to manage anxiety symptoms, and will increase ability to function adaptively  Psychological distress related to Sleep Disturbance    Current Stressors / Issues:   [facility] updates PHQ-2 with pt during the visit.     Medication Questions/Requests: struggling with weight gain, has tried thing to help     MH update: Pt says that things are the same. Anxiety is what they are struggling with more. Day to day living and kids going to school, work and the world. To cope pt will deep breathing, meditations, and reading. She will have thoughts snow ball. They have tried a lot of different things for anxiety and with the alprazolam she can notice a difference. If not worrying she would be able to be more focused and spend quality time with people. Deep breathing and focusing attention and being more present. She will try not to watch the news.   Stressors: daily things, driving in the car- tension, getting things done and home, working   Side Effects:   Mood: stable, irritable- mindfulness, deep breathing and walking away to cope, changing perspective  Appetite: unremarkable  Sleep:mostly good, days before work or need to catch up it's hard to fall asleep and will get frustrated and can't fall back asleep- try to think about something else, story, memory, reviewed sleep hygiene   Pregnancy: No  Impulsive spending/spending  sprees: if have money  Suicidality: pt denies recent, thoughts in the past   Self-harm: pt denies  Substance Use: no tobacco, alcohol occasional socially   Caffeine: drink coffee, towards early part of the day   Therapist: did meet with someone and will need to wait until they can commit, schedule isn't regular   Interventions: sleep hygiene, anxiety apps and being present through mindfulness activities         Progress on Treatment Objective(s) / Homework:  No improvement - ACTION (Actively working towards change); Intervened by reinforcing change plan / affirming steps taken    CBT/MI: Pt is using deep breathing, meditations and reading as ways to cope. Pt has also limited their exposure to the news.     [facility] reviews sleep hygiene and recommends pt to get out of bed and go to the living room and do something relaxing but nothing that will hold their attention to help them get back to sleep. [facility] celebrates that pt already has skills they are using to cope with worry. [facility] encourages pt to continue to practice mindfulness strategies to be rupert to help stay more present and will send these through [employer] along with other apps that may be helpful until pt is able to try therapy again.     Motivational Interviewing    MI Intervention: Co-Developed Goal: improve anxiety and sleep, Expressed Empathy/Understanding, Supported Autonomy, Collaboration, Evocation, Permission to raise concern or advise, Open-ended questions, Reflections: simple and complex, Change talk (evoked), and Reframe     Change Talk Expressed by the Patient: Committment to change Activation Taking steps    Provider Response to Change Talk: E - Evoked more info from patient about behavior change, A - Affirmed patient's thoughts, decisions, or attempts at behavior change, R - Reflected patient's change talk, and S - Summarized patient's change talk statements    Also provided psychoeducation about behavioral health condition, symptoms, and treatment  options. [facility] provides psychoeducation about anxiety.     Assessments completed prior to visit:  The following assessments were completed by patient for this visit:  PHQ2:       11/27/2023     8:58 AM 7/25/2023    10:07 AM 2/6/2023    11:17 AM 1/9/2023     5:58 PM 12/5/2022     3:20 PM 5/31/2022     9:18 AM 5/1/2022     6:01 PM   PHQ-2 ( 1999 [employer])   Q1: Little interest or pleasure in doing things 1 1     1   Q2: Feeling down, depressed or hopeless 1 1     1   PHQ-2 Score 2 2     2   Q1: Little interest or pleasure in doing things  Several days     Several days   Q2: Feeling down, depressed or hopeless  Several days     Several days   PHQ-2 Score  2 Incomplete Incomplete Incomplete Incomplete 2     GAD7:       3/31/2023    11:25 AM 5/4/2023     9:16 AM 6/14/2023    10:01 AM 7/5/2023     6:07 PM 7/19/2023    12:19 PM 10/10/2023     9:59 AM 11/24/2023    11:02 AM   BRETT-7 SCORE   Total Score 19 (severe anxiety) 21 (severe anxiety) 19 (severe anxiety) 18 (severe anxiety) 18 (severe anxiety) 21 (severe anxiety) 18 (severe anxiety)   Total Score 19 21    21    21 19 18 18 21 18    18       Care Plan review completed: No    Medication Review:  No changes to current psychiatric medication(s)    Medication Compliance:  Yes, have weekly pill box     Changes in Health Issues:   Yes: cold     Chemical Use Review:   Substance Use: Chemical use reviewed, no active concerns identified      Tobacco Use: No current tobacco use.      Assessment: Current Emotional / Mental Status (status of significant symptoms):  Risk status (Self / Other harm or suicidal ideation)  Patient has had a history of suicidal ideation: In high school and suicide attempts: in high school according to Connie Cormier note 5/10/2023  Patient denies current fears or concerns for personal safety.  Patient denies current or recent suicidal ideation or behaviors.  Patient denies current or recent homicidal ideation or behaviors.  Patient denies current or recent  self injurious behavior or ideation.  Patient denies other safety concerns.  A safety and risk management plan has not been developed at this time, however patient was encouraged to call [place] [phone] should there be a change in any of these risk factors.    Appearance:   Appropriate   Eye Contact:   Good   Psychomotor Behavior: Normal   Attitude:   Cooperative  Interested Pleasant  Orientation:   All  Speech   Rate / Production: Normal    Volume:  Normal   Mood:    Anxious   Affect:    Appropriate   Thought Content:  Clear   Thought Form:  Coherent  Logical   Insight:    Good     Diagnoses:  1. Generalized anxiety disorder    2. Moderate episode of recurrent major depressive disorder (H)        Collateral Reports Completed:  Communicated with: Martin Bose M.D.     Plan: (Homework, other):  Patient was given information about behavioral services and encouraged to schedule a follow up appointment with the clinic [facility] in 1 month.  She was also given information about mental health symptoms and treatment options .  CD Recommendations: No indications of CD issues. [facility] will provide pt with mindfulness exercises and apps that might be helpful to cope until they are able to try therapy again.     Milla Hendrix, [facility]    ______________________________________________________________________    Integrated Primary Care Behavioral Health Treatment Plan    Patient's Name: Rebecca Fischer  YOB: 1985    Date of Creation: in the next few visits  Date Treatment Plan Last Reviewed/Revised: in the next few visits    DSM5 Diagnoses:   1. Generalized anxiety disorder    2. Moderate episode of recurrent major depressive disorder (H)      Psychosocial / Contextual Factors: works, children, trouble with sleep, has skills to cope she is using  PROMIS (reviewed every 90 days):   PROMIS 10-Global Health (only subscores and total score):       3/31/2023    11:27 AM 4/3/2023     9:12 AM 5/4/2023     9:17 AM 10/10/2023     10:00 AM   PROMIS-10 Scores Only   Global Mental Health Score 8 8 8    8 7   Global Physical Health Score 12 13 13    13 13   PROMIS TOTAL - SUBSCORES 20 21 21    21 20       Referral / Collaboration:  Referral to another professional/service is not indicated at this time.    Anticipated number of session for this episode of care: 5-6  Anticipation frequency of session: Monthly  Anticipated Duration of each session: 16-37 minutes  Treatment plan will be reviewed in 90 days or when goals have been changed.         Patient has reviewed and agreed to the above plan.      Milla Hendrix, BRIAN  November 27, 2023

## 2023-11-27 ENCOUNTER — VIRTUAL VISIT (OUTPATIENT)
Dept: PSYCHIATRY | Facility: CLINIC | Age: 38
End: 2023-11-27
Payer: COMMERCIAL

## 2023-11-27 ENCOUNTER — VIRTUAL VISIT (OUTPATIENT)
Dept: BEHAVIORAL HEALTH | Facility: CLINIC | Age: 38
End: 2023-11-27
Payer: COMMERCIAL

## 2023-11-27 DIAGNOSIS — F33.1 MODERATE EPISODE OF RECURRENT MAJOR DEPRESSIVE DISORDER (H): ICD-10-CM

## 2023-11-27 DIAGNOSIS — F41.1 GENERALIZED ANXIETY DISORDER: ICD-10-CM

## 2023-11-27 DIAGNOSIS — F41.1 GENERALIZED ANXIETY DISORDER: Primary | ICD-10-CM

## 2023-11-27 DIAGNOSIS — G47.00 INSOMNIA, UNSPECIFIED TYPE: ICD-10-CM

## 2023-11-27 DIAGNOSIS — F33.1 MODERATE EPISODE OF RECURRENT MAJOR DEPRESSIVE DISORDER (H): Primary | ICD-10-CM

## 2023-11-27 PROCEDURE — 90832 PSYTX W PT 30 MINUTES: CPT | Mod: 95 | Performed by: COUNSELOR

## 2023-11-27 PROCEDURE — 99214 OFFICE O/P EST MOD 30 MIN: CPT | Mod: VID | Performed by: PSYCHIATRY & NEUROLOGY

## 2023-11-27 RX ORDER — TRAZODONE HYDROCHLORIDE 50 MG/1
50 TABLET, FILM COATED ORAL
Qty: 90 TABLET | Refills: 0 | Status: SHIPPED | OUTPATIENT
Start: 2023-11-27 | End: 2024-04-10

## 2023-11-27 ASSESSMENT — COLUMBIA-SUICIDE SEVERITY RATING SCALE - C-SSRS
1. IN THE PAST MONTH, HAVE YOU WISHED YOU WERE DEAD OR WISHED YOU COULD GO TO SLEEP AND NOT WAKE UP?: NO
TOTAL  NUMBER OF INTERRUPTED ATTEMPTS LIFETIME: NO
2. HAVE YOU ACTUALLY HAD ANY THOUGHTS OF KILLING YOURSELF?: NO
1. HAVE YOU WISHED YOU WERE DEAD OR WISHED YOU COULD GO TO SLEEP AND NOT WAKE UP?: YES
TOTAL  NUMBER OF ABORTED OR SELF INTERRUPTED ATTEMPTS LIFETIME: NO
6. HAVE YOU EVER DONE ANYTHING, STARTED TO DO ANYTHING, OR PREPARED TO DO ANYTHING TO END YOUR LIFE?: NO
REASONS FOR IDEATION LIFETIME: DOES NOT APPLY
ATTEMPT LIFETIME: NO

## 2023-11-27 ASSESSMENT — PAIN SCALES - GENERAL: PAINLEVEL: NO PAIN (0)

## 2023-11-27 NOTE — PROGRESS NOTES
Virtual Visit Details    Type of service:  Video Visit     Originating Location (pt. Location): Other work [place], [place]    Distant Location (provider location):  On-site  Platform used for Video Visit: [facility] Psychiatry Consult Note    IDENTIFICATION   Name: Rebecca Fischer   : 1985/38 year old      Sex:    @ female          Telemedicine Visit: The patient's condition can be safely assessed and treated via synchronous audio and visual telemedicine encounter.        Face to Face/patient Contact total time: 15 minutes  Pre Charting time: 2 minutes; Post charting time, communication and other activities: 0 minutes; Total time 17 minutes  9:08 AM -9:23 AM          SUBJECTIVE   Doing slightly better. Better ability to stop thought/emotion snowballing. Practice and meditation has helped. Having weight gain overall with med management.       The following assessments were completed by patient for this visit:  PROMIS 10-Global Health (only subscores and total score):       3/31/2023    11:27 AM 4/3/2023     9:12 AM 2023     9:17 AM 10/10/2023    10:00 AM   PROMIS-10 Scores Only   Global Mental Health Score 8 8 8    8 7   Global Physical Health Score 12 13 13    13 13   PROMIS TOTAL - SUBSCORES 20 21 21    21 20             2023    10:00 AM 2023     6:07 PM 2023    12:18 PM   PHQ   PHQ-9 Total Score 14 11 11   Q9: Thoughts of better off dead/self-harm past 2 weeks Not at all Not at all Not at all          2023    12:19 PM 10/10/2023     9:59 AM 2023    11:02 AM   BRETT-7 SCORE   Total Score 18 (severe anxiety) 21 (severe anxiety) 18 (severe anxiety)   Total Score 18 21 18    18        OBJECTIVE     Vital Signs:   There were no vitals taken for this visit.    Labs:  N/A    Current Medications:  Current Outpatient Medications   Medication    ALPRAZolam (XANAX) 0.5 MG tablet    ECHINACEA EXTRACT PO    levonorgestrel (MIRENA) 20 MCG/24HR IUD    Multiple Vitamins-Minerals  (EMERGEN-C IMMUNE PO)    Prenatal Vit-Fe Fumarate-FA (PRENATAL VITAMIN AND MINERAL PO)    traZODone (DESYREL) 50 MG tablet    venlafaxine (EFFEXOR XR) 150 MG 24 hr capsule    venlafaxine (EFFEXOR XR) 37.5 MG 24 hr capsule     No current facility-administered medications for this visit.        The [place] Prescription Monitoring Program has been reviewed and there are no concerns about diversionary activity for controlled substances at this time.        ADDED HISTORY   On sertraline or fluoxetine had 60 lb weight gain.     MENTAL STATUS EXAMINATION:   Appearance: Intact attention to grooming and hygiene  Attitude: Cooperative  Eye Contact: Good  Gait and Station: Sitting  Psychomotor Behavior: Within normal limits  Oriented to: Grossly person place or time  Attention Span and Concentration: Grossly intact  Speech: Within normal limits  Language: English  Mood:  mildly anxious  Affect: Mildly constricted  Associations:  no loose associations  Thought Process:  logical, linear and goal oriented  Thought Content: No evidence of delusions or suicidal or homicidal ideation plan or intent  Memory: Grossly intact  Fund of Knowledge: Good  Insight:  good  Judgment:  intact, adequate for safety  Impulse Control:  intact        DIAGNOSES:   Major depressive disorder, moderate, recurrent, in partial remission  Generalized anxiety disorder      ASSESSMENT:   Patient with improvements in depression and anxiety with Effexor titration.  Titrate further to get further symptom remission of anxiety.    Today Rebecca Fischer reports no suicidal ideations. In addition, she has notable risk factors for self-harm, including anxiety. However, risk is mitigated by commitment to family, Islam beliefs, and absence of past attempts. Therefore, based on all available evidence including the factors cited above, she does not appear to be at imminent risk for self-harm, does not meet criteria for a 72-hr hold, and therefore remains  appropriate for ongoing outpatient level of care.       PLAN:     Patient advised of consultative model. Patient will continue to be seen for ongoing consultation and stabilization.  Does not meet criteria for involuntary treatment or hospitalization  Effexor  mg daily partial benefits => 10/17/23 187.5 mg daily-Risks, benefits and alternatives discussed.  Patient provides verbal consent to treatment.  Continue alprazolam 0.5 mg daily as needed anxiety-Risks, benefits and alternatives discussed.  Patient provides verbal consent to treatment.  Controlled substance rules sent by medical message and key rules discussed. Up to quantity 10/month  Trazodone 50 mg p.o. nightly as needed insomnia-provides well consent to treatment  Options-buspirone only with therapy, duloxetine, Viibryd  DC'd gabapentin (no need, lacking benefits at low dose)  Labs -no further labs indicated at this time  Referred for psychotherapy  Return in 6 to 8 weeks    Administrative Billing:   Time spent with patient was 30 minutes and greater than 50% of time or 20 minutes was spent in counseling and coordination of care regarding above diagnoses and treatment plan.    Administrative Billing:   Time spent with patient was greater than 50% of time and/or significant time was spent in counseling and coordination of care regarding above diagnoses and treatment plan. Pre charting time and post charting time/documentation/coordination are done on date of service.      Signed:   Martin Bose M.D.  [facility] Psychiatry Service    Disclaimer: This note consists of symbols derived from keyboarding, dictation and/or voice recognition software. As a result, there may be errors in the script that have gone undetected. Please consider this when interpreting information found in this chart.

## 2023-11-27 NOTE — PATIENT INSTRUCTIONS
"Med options:  Increase effexor  Cymbalta - very low chance of weight gain, more likely to cause weight lows  Buspar - more for anxiety than depression, no weight gain  Wellbutrin - could help with weight loss, or cause weight gain or no change and can lose weight on it  Vilazodone - 2% chance of weight    Patient Education   Collaborative Care Psychiatry Service  What to Expect  Here's what to expect from your Collaborative Care Psychiatry Service (CCPS).   About CCPS  CCPS means 2 people work together to help you get better. You'll meet with a behavioral health clinician and a psychiatric doctor. A behavioral health clinician helps people with mental health problems by talking with them. A psychiatric doctor helps people by giving them medicine.  How it works  At every visit, you'll see the behavioral health clinician (BHC) first. They'll talk with you about how you're doing and teach you how to feel better.   Then you'll see the psychiatric doctor. This doctor can help you deal with troubling thoughts and feelings by giving you medicine. They'll make sure you know the plan for your care.   CCPS usually takes 3 to 6 visits. If you need more visits, we may have you start seeing a different psychiatric doctor for ongoing care.  If you have any questions or concerns, we'll be glad to talk with you.  About visits  Be open  At your visits, please talk openly about your problems. It may feel hard, but it's the best way for us to help you.  Cancelling visits  If you can't come to your visit, please call us right away at [phone]. If you don't cancel at least 24 hours (1 full day) before your visit, that's \"late cancellation.\"  Being late to visits  Being very late is the same as not showing up. You will be a \"no show\" if:  Your appointment starts with a BHC, and you're more than 15 minutes late for a 30-minute (half hour) visit. This will also cancel your appointment with the psychiatric doctor.  Your appointment is " with a psychiatric doctor only, and you're more than 15 minutes late for a 30-minute (half hour) visit.  Your appointment is with a psychiatric doctor only, and you're more than 30 minutes late for a 60-minute (full hour) visit.  If you cancel late or don't show up 2 times within 6 months, we may end your care.   Getting help between visits  If you need help between visits, you can call us Monday to Friday from 8 a.m. to 4:30 p.m. at [phone].  Emergency care  Call 911 or go to the nearest emergency department if your life or someone else's life is in danger.  Call 758 anytime to reach the national Suicide and Crisis hotline.  Medicine refills  To refill your medicine, call your pharmacy. You can also call [facility]'s Behavioral Access at [phone], Monday to Friday, 8 a.m. to 4:30 p.m. It can take 1 to 3 business days to get a refill.   Forms, letters, and tests  You may have papers to fill out, like FMLA, short-term disability, and workability. We can help you with these forms at your visits, but you must have an appointment. You may need more than 1 visit for this, to be in an intensive therapy program, or both.  Before we can give you medicine for ADHD, we may refer you to get tested for it or confirm it another way.  We may not be able to give you an emotional support animal letter.  We don't do mental health checks ordered by the court.   We don't do mental health testing, but we can refer you to get tested.   Thank you for choosing us for your care.  For informational purposes only. Not to replace the advice of your health care provider. Copyright   2022 [facility]. All rights reserved. [id number] - 12/22.

## 2023-11-27 NOTE — NURSING NOTE
Is the patient currently in the state of [place]? YES    Visit mode:VIDEO    If the visit is dropped, the patient can be reconnected by: VIDEO VISIT: Text to cell phone:   Telephone Information:   Mobile [phone]       Will anyone else be joining the visit? NO  (If patient encounters technical issues they should call [phone]    How would you like to obtain your AVS? MyChart    Are changes needed to the allergy or medication list? Pt stated no changes to allergies and Pt stated no med changes    Reason for visit: RECHECK    Patient stated they would complete questionnaire via [employer].    Vera ROJAS

## 2023-12-13 ENCOUNTER — OFFICE VISIT (OUTPATIENT)
Dept: INTERNAL MEDICINE | Facility: CLINIC | Age: 38
End: 2023-12-13
Payer: COMMERCIAL

## 2023-12-13 VITALS
OXYGEN SATURATION: 100 % | RESPIRATION RATE: 16 BRPM | DIASTOLIC BLOOD PRESSURE: 86 MMHG | HEART RATE: 98 BPM | SYSTOLIC BLOOD PRESSURE: 123 MMHG | WEIGHT: 183 LBS | BODY MASS INDEX: 31.24 KG/M2 | HEIGHT: 64 IN | TEMPERATURE: 98.3 F

## 2023-12-13 DIAGNOSIS — D64.9 ANEMIA, UNSPECIFIED TYPE: ICD-10-CM

## 2023-12-13 DIAGNOSIS — Z13.220 SCREENING FOR CHOLESTEROL LEVEL: ICD-10-CM

## 2023-12-13 DIAGNOSIS — Z23 NEED FOR COVID-19 VACCINE: ICD-10-CM

## 2023-12-13 DIAGNOSIS — Z00.00 ROUTINE GENERAL MEDICAL EXAMINATION AT A HEALTH CARE FACILITY: ICD-10-CM

## 2023-12-13 DIAGNOSIS — Z13.1 SCREENING FOR DIABETES MELLITUS: ICD-10-CM

## 2023-12-13 DIAGNOSIS — Z12.4 CERVICAL CANCER SCREENING: Primary | ICD-10-CM

## 2023-12-13 DIAGNOSIS — Z13.29 SCREENING FOR THYROID DISORDER: ICD-10-CM

## 2023-12-13 LAB
BASOPHILS # BLD AUTO: 0.1 10E3/UL (ref 0–0.2)
BASOPHILS NFR BLD AUTO: 1 %
CHOLEST SERPL-MCNC: 219 MG/DL
EOSINOPHIL # BLD AUTO: 0.1 10E3/UL (ref 0–0.7)
EOSINOPHIL NFR BLD AUTO: 1 %
ERYTHROCYTE [DISTWIDTH] IN BLOOD BY AUTOMATED COUNT: 12.1 % (ref 10–15)
FASTING STATUS PATIENT QL REPORTED: YES
HBA1C MFR BLD: 5.5 % (ref 0–5.6)
HCT VFR BLD AUTO: 40.8 % (ref 35–47)
HDLC SERPL-MCNC: 71 MG/DL
HGB BLD-MCNC: 13.5 G/DL (ref 11.7–15.7)
IMM GRANULOCYTES # BLD: 0 10E3/UL
IMM GRANULOCYTES NFR BLD: 0 %
LDLC SERPL CALC-MCNC: 135 MG/DL
LYMPHOCYTES # BLD AUTO: 1.2 10E3/UL (ref 0.8–5.3)
LYMPHOCYTES NFR BLD AUTO: 17 %
MCH RBC QN AUTO: 29.8 PG (ref 26.5–33)
MCHC RBC AUTO-ENTMCNC: 33.1 G/DL (ref 31.5–36.5)
MCV RBC AUTO: 90 FL (ref 78–100)
MONOCYTES # BLD AUTO: 0.6 10E3/UL (ref 0–1.3)
MONOCYTES NFR BLD AUTO: 9 %
NEUTROPHILS # BLD AUTO: 4.9 10E3/UL (ref 1.6–8.3)
NEUTROPHILS NFR BLD AUTO: 71 %
NONHDLC SERPL-MCNC: 148 MG/DL
PLATELET # BLD AUTO: 279 10E3/UL (ref 150–450)
RBC # BLD AUTO: 4.53 10E6/UL (ref 3.8–5.2)
TRIGL SERPL-MCNC: 65 MG/DL
TSH SERPL DL<=0.005 MIU/L-ACNC: 1.81 UIU/ML (ref 0.3–4.2)
WBC # BLD AUTO: 6.9 10E3/UL (ref 4–11)

## 2023-12-13 PROCEDURE — 99395 PREV VISIT EST AGE 18-39: CPT | Mod: 25

## 2023-12-13 PROCEDURE — 84443 ASSAY THYROID STIM HORMONE: CPT

## 2023-12-13 PROCEDURE — 90480 ADMN SARSCOV2 VAC 1/ONLY CMP: CPT

## 2023-12-13 PROCEDURE — 80061 LIPID PANEL: CPT

## 2023-12-13 PROCEDURE — G0145 SCR C/V CYTO,THINLAYER,RESCR: HCPCS

## 2023-12-13 PROCEDURE — 99213 OFFICE O/P EST LOW 20 MIN: CPT | Mod: 25

## 2023-12-13 PROCEDURE — 85025 COMPLETE CBC W/AUTO DIFF WBC: CPT

## 2023-12-13 PROCEDURE — 91320 SARSCV2 VAC 30MCG TRS-SUC IM: CPT

## 2023-12-13 PROCEDURE — 83036 HEMOGLOBIN GLYCOSYLATED A1C: CPT

## 2023-12-13 PROCEDURE — 36415 COLL VENOUS BLD VENIPUNCTURE: CPT

## 2023-12-13 PROCEDURE — 87624 HPV HI-RISK TYP POOLED RSLT: CPT

## 2023-12-13 RX ORDER — PHENTERMINE HYDROCHLORIDE 15 MG/1
15 CAPSULE ORAL EVERY MORNING
Qty: 30 CAPSULE | Refills: 0 | Status: SHIPPED | OUTPATIENT
Start: 2023-12-13 | End: 2024-01-12

## 2023-12-13 ASSESSMENT — ENCOUNTER SYMPTOMS
BREAST MASS: 0
MYALGIAS: 0
PARESTHESIAS: 0
PALPITATIONS: 0
DIARRHEA: 0
FEVER: 0
CONSTIPATION: 0
DIZZINESS: 0
JOINT SWELLING: 0
CHILLS: 0
HEARTBURN: 0
ARTHRALGIAS: 0
HEMATOCHEZIA: 0
EYE PAIN: 0
NAUSEA: 0
FREQUENCY: 0
WEAKNESS: 0
SHORTNESS OF BREATH: 0
NERVOUS/ANXIOUS: 1
ABDOMINAL PAIN: 0
SORE THROAT: 0
COUGH: 0
HEADACHES: 1
HEMATURIA: 0
DYSURIA: 0

## 2023-12-13 ASSESSMENT — PATIENT HEALTH QUESTIONNAIRE - PHQ9
10. IF YOU CHECKED OFF ANY PROBLEMS, HOW DIFFICULT HAVE THESE PROBLEMS MADE IT FOR YOU TO DO YOUR WORK, TAKE CARE OF THINGS AT HOME, OR GET ALONG WITH OTHER PEOPLE: SOMEWHAT DIFFICULT
SUM OF ALL RESPONSES TO PHQ QUESTIONS 1-9: 12
SUM OF ALL RESPONSES TO PHQ QUESTIONS 1-9: 12

## 2023-12-13 NOTE — PROGRESS NOTES
SUBJECTIVE:   Rebecca is a 38 year old, presenting for the following:  Physical        2023     9:22 AM   Additional Questions   Roomed by Estrella JIMENES       Healthy Habits:     Getting at least 3 servings of Calcium per day:  Yes    Bi-annual eye exam:  NO    Dental care twice a year:  Yes    Sleep apnea or symptoms of sleep apnea:  None    Diet:  Regular (no restrictions)    Frequency of exercise:  2-3 days/week    Duration of exercise:  30-45 minutes    Taking medications regularly:  Yes    Medication side effects:  None    Additional concerns today:  Yes      Today's PHQ-9 Score:       2023     8:58 AM   PHQ-9 SCORE   PHQ-9 Total Score MyChart 12 (Moderate depression)   PHQ-9 Total Score 12                 Discuss weight gain,       Social History     Tobacco Use    Smoking status: Former     Types: Cigarettes     Quit date: 2006     Years since quittin.0    Smokeless tobacco: Never   Substance Use Topics    Alcohol use: Yes             2023     9:00 AM   Alcohol Use   Prescreen: >3 drinks/day or >7 drinks/week? No     Reviewed orders with patient.  Reviewed health maintenance and updated orders accordingly - Yes  Lab work is in process    Breast Cancer Screening:    FHS-7:        No data to display              click delete button to remove this line now  Patient under 40 years of age: Routine Mammogram Screening not recommended.   Pertinent mammograms are reviewed under the imaging tab.    History of abnormal Pap smear: NO - age 30- 65 PAP every 3 years recommended     Reviewed and updated as needed this visit by clinical staff                  Reviewed and updated as needed this visit by Provider                     Review of Systems   Constitutional:  Negative for chills and fever.   HENT:  Negative for congestion, ear pain, hearing loss and sore throat.    Eyes:  Negative for pain and visual disturbance.   Respiratory:  Negative for cough and shortness of breath.     Cardiovascular:  Negative for chest pain, palpitations and peripheral edema.   Gastrointestinal:  Negative for abdominal pain, constipation, diarrhea, heartburn, hematochezia and nausea.   Breasts:  Negative for tenderness, breast mass and discharge.   Genitourinary:  Negative for dysuria, frequency, genital sores, hematuria, pelvic pain, urgency, vaginal bleeding and vaginal discharge.   Musculoskeletal:  Negative for arthralgias, joint swelling and myalgias.   Skin:  Negative for rash.   Neurological:  Positive for headaches. Negative for dizziness, weakness and paresthesias.   Psychiatric/Behavioral:  Negative for mood changes. The patient is nervous/anxious.           OBJECTIVE:   There were no vitals taken for this visit.  Physical Exam  Constitutional:       Appearance: Normal appearance.   HENT:      Head: Normocephalic.      Nose: Nose normal.      Mouth/Throat:      Mouth: Mucous membranes are moist.   Eyes:      Pupils: Pupils are equal, round, and reactive to light.   Cardiovascular:      Rate and Rhythm: Normal rate and regular rhythm.      Pulses: Normal pulses.      Heart sounds: No murmur heard.     No gallop.   Pulmonary:      Effort: Pulmonary effort is normal. No respiratory distress.      Breath sounds: No wheezing.   Abdominal:      General: Bowel sounds are normal. There is no distension.      Tenderness: There is no abdominal tenderness. There is no guarding.   Musculoskeletal:         General: No swelling, tenderness or deformity. Normal range of motion.   Skin:     General: Skin is warm.      Coloration: Skin is not jaundiced.      Findings: No bruising.   Neurological:      General: No focal deficit present.      Mental Status: She is alert and oriented to person, place, and time.   Psychiatric:         Mood and Affect: Mood normal.         Behavior: Behavior normal.           Diagnostic Test Results:  Labs reviewed in Epic  Results for orders placed or performed in visit on 12/13/23    Hemoglobin A1c     Status: Normal   Result Value Ref Range    Hemoglobin A1C 5.5 0.0 - 5.6 %   CBC with platelets and differential     Status: None   Result Value Ref Range    WBC Count 6.9 4.0 - 11.0 10e3/uL    RBC Count 4.53 3.80 - 5.20 10e6/uL    Hemoglobin 13.5 11.7 - 15.7 g/dL    Hematocrit 40.8 35.0 - 47.0 %    MCV 90 78 - 100 fL    MCH 29.8 26.5 - 33.0 pg    MCHC 33.1 31.5 - 36.5 g/dL    RDW 12.1 10.0 - 15.0 %    Platelet Count 279 150 - 450 10e3/uL    % Neutrophils 71 %    % Lymphocytes 17 %    % Monocytes 9 %    % Eosinophils 1 %    % Basophils 1 %    % Immature Granulocytes 0 %    Absolute Neutrophils 4.9 1.6 - 8.3 10e3/uL    Absolute Lymphocytes 1.2 0.8 - 5.3 10e3/uL    Absolute Monocytes 0.6 0.0 - 1.3 10e3/uL    Absolute Eosinophils 0.1 0.0 - 0.7 10e3/uL    Absolute Basophils 0.1 0.0 - 0.2 10e3/uL    Absolute Immature Granulocytes 0.0 <=0.4 10e3/uL   CBC with platelets and differential     Status: None    Narrative    The following orders were created for panel order CBC with platelets and differential.  Procedure                               Abnormality         Status                     ---------                               -----------         ------                     CBC with platelets and d...[[id number]]                      Final result                 Please view results for these tests on the individual orders.       ASSESSMENT/PLAN:   (Z00.00) Routine general medical examination at a health care facility  Comment: patient was seen in clinic today for preventive wellness exam. Patient wanted to discuss weight loss at this visit. Also discussed labs and vaccines.   Plan: Will update with any new concerns.    (Z12.4) Cervical cancer screening  (primary encounter diagnosis)  Comment: Discussed need to screen this visit.  Plan: Pap Screen with HPV - recommended age 30 - 65 years        Pending    (Z13.220) Screening for cholesterol level  Comment: Discussed screening   Plan: Lipid panel reflex  to direct LDL Fasting        Pending    (D64.9) Anemia, unspecified type  Comment: Patient has history of anemia   Plan: CBC with platelets and differential            (Z13.1) Screening for diabetes mellitus  Comment: Discussed screening  Plan: Hemoglobin A1C 5.5            (Z13.29) Screening for thyroid disorder  Comment: Discussed screening   Plan: TSH with free T4 reflex        Pending    (Z68.31) Adult BMI 31.0-31.9 kg/sq m  Comment: Discussed weight loss medications and strategies. Patient has tried Topamax and did not get desired results.   Plan: Start phentermine (ADIPEX-P) 15 MG capsule  Patient will update with any adverse symptoms as well as update if medications is effective     (Z23) Need for COVID vaccine    Comment: Discussed need for vaccine  Plan: Given       Patient has been advised of split billing requirements and indicates understanding: Yes      COUNSELING:  Reviewed preventive health counseling, as reflected in patient instructions       Regular exercise       Healthy diet/nutrition        She reports that she quit smoking about 17 years ago. Her smoking use included cigarettes. She has never used smokeless tobacco.          IGOR Jauregui [facility] [place]  Answers submitted by the patient for this visit:  Patient Health Questionnaire (Submitted on 12/13/2023)  If you checked off any problems, how difficult have these problems made it for you to do your work, take care of things at home, or get along with other people?: Somewhat difficult  PHQ9 TOTAL SCORE: 12

## 2023-12-18 LAB
BKR LAB AP GYN ADEQUACY: NORMAL
BKR LAB AP GYN INTERPRETATION: NORMAL
BKR LAB AP HPV REFLEX: NORMAL
BKR LAB AP PREVIOUS ABNORMAL: NORMAL
PATH REPORT.COMMENTS IMP SPEC: NORMAL
PATH REPORT.COMMENTS IMP SPEC: NORMAL
PATH REPORT.RELEVANT HX SPEC: NORMAL

## 2023-12-19 ENCOUNTER — TELEPHONE (OUTPATIENT)
Dept: INTERNAL MEDICINE | Facility: CLINIC | Age: 38
End: 2023-12-19
Payer: COMMERCIAL

## 2023-12-19 LAB
HUMAN PAPILLOMA VIRUS 16 DNA: NEGATIVE
HUMAN PAPILLOMA VIRUS 18 DNA: NEGATIVE
HUMAN PAPILLOMA VIRUS FINAL DIAGNOSIS: NORMAL
HUMAN PAPILLOMA VIRUS OTHER HR: NEGATIVE

## 2023-12-19 NOTE — TELEPHONE ENCOUNTER
Topic: Non-Medical Question.     How can I request a physical form be filled out by my care team? I just had my annual physical and have attached document I need for work. Please advise.     Thank you,     Rebecca Fischer    Attachments   [facility] STATEMENT (1).pdf

## 2023-12-19 NOTE — TELEPHONE ENCOUNTER
[facility] Health Screening form printed and given to Gayla Murphy CNP to complete and sign.  Lizz Berg,

## 2023-12-20 NOTE — TELEPHONE ENCOUNTER
LM for patient to return call.  Form completed and signed.  Need to know if patient wants to  or how she would like to receive it?  Lizz Berg,

## 2023-12-27 ENCOUNTER — MYC REFILL (OUTPATIENT)
Dept: PSYCHIATRY | Facility: CLINIC | Age: 38
End: 2023-12-27
Payer: COMMERCIAL

## 2023-12-27 DIAGNOSIS — F41.1 GENERALIZED ANXIETY DISORDER: ICD-10-CM

## 2023-12-27 DIAGNOSIS — F33.1 MODERATE EPISODE OF RECURRENT MAJOR DEPRESSIVE DISORDER (H): ICD-10-CM

## 2023-12-27 RX ORDER — VENLAFAXINE HYDROCHLORIDE 150 MG/1
150 CAPSULE, EXTENDED RELEASE ORAL DAILY
Qty: 30 CAPSULE | Refills: 0 | OUTPATIENT
Start: 2023-12-27

## 2023-12-27 RX ORDER — ALPRAZOLAM 0.5 MG
0.5 TABLET ORAL DAILY PRN
Qty: 10 TABLET | Refills: 0 | OUTPATIENT
Start: 2023-12-27

## 2023-12-27 NOTE — TELEPHONE ENCOUNTER
Date of Last Office Visit: 11/27/2023  Date of Next Office Visit: nothing scheduled   No shows since last visit: none  Cancellations since last visit: none    Medication requested: venlafaxine (EFFEXOR XR) 150 MG 24 hr capsule  Date last ordered: 10/17/2023 Qty: 30 Refills: 3    Medication requested: alprazolam (XANAX) 0.5 MG tablet  Date last ordered: 10/17/2023 Qty: 10 Refills: 3     [place] of [place] ?: yes  Medication last sold date: 11/21/23 Qty filled: 10  [place] on [place] ?: Yes  If yes, is this a new medication?: Yes  If yes, name of medication: Phentermine 15 mg capsule  and date filled: 12/17/23 #30    Lapse in medication adherence greater than 5 days?: NO  If yes, call patient and gather details: n/a      RN called [facility] [address] [place] [address] at [phone], per Rufino the patent had 2 refills of file for both of these medications.     RN denied refills due to having refills on file already at the pharmacy.    Jossy Hernández RN on 12/27/2023 at 2:18 PM

## 2024-01-11 ENCOUNTER — TELEPHONE (OUTPATIENT)
Dept: DERMATOLOGY | Facility: CLINIC | Age: 39
End: 2024-01-11

## 2024-01-12 RX ORDER — PHENTERMINE HYDROCHLORIDE 15 MG/1
15 CAPSULE ORAL EVERY MORNING
Qty: 30 CAPSULE | Refills: 0 | Status: SHIPPED | OUTPATIENT
Start: 2024-01-12 | End: 2024-03-15

## 2024-01-15 ENCOUNTER — MYC REFILL (OUTPATIENT)
Dept: PSYCHIATRY | Facility: CLINIC | Age: 39
End: 2024-01-15
Payer: COMMERCIAL

## 2024-01-15 DIAGNOSIS — G47.00 INSOMNIA, UNSPECIFIED TYPE: ICD-10-CM

## 2024-01-15 RX ORDER — TRAZODONE HYDROCHLORIDE 50 MG/1
50 TABLET, FILM COATED ORAL
Qty: 90 TABLET | Refills: 0 | OUTPATIENT
Start: 2024-01-15

## 2024-01-15 NOTE — TELEPHONE ENCOUNTER
Date of Last Office Visit: 11/27/2023  Date of Next Office Visit: nothing scheduled   No shows since last visit: none  Cancellations since last visit: none    Medication requested:  trazodone (DESYREL) 50 MG tablet Date last ordered: 11/27/2023 Qty: 90 Refills: 0  Take 1 tablet (50 mg) by mouth nightly as needed for sleep  = a 90 day supply  Sent to [facility] in [place], [place]    RN called the pharmacy to verify if refills were on file for this medication . RN spoke to Moraima carbajal [facility] pharmacy ans she indicated that there is a 30-day refill ready for  for this patient and that they do not have anymore refills on file for this medication for this patient.      [place] ?: [place] the [place]-    RN sent a MyC message to the patient indicating that the pharmacy has a refill of this medication ready for  and that she needs to schedule a follow up appointment with Dr. Bose.  Patient was also informed that this refill request would be denied d/t - too early to refill.     Jossy Hernández RN on 1/15/2024 at 1:28 PM

## 2024-03-15 ENCOUNTER — MYC REFILL (OUTPATIENT)
Dept: PSYCHIATRY | Facility: CLINIC | Age: 39
End: 2024-03-15
Payer: COMMERCIAL

## 2024-03-15 ENCOUNTER — MYC REFILL (OUTPATIENT)
Dept: INTERNAL MEDICINE | Facility: CLINIC | Age: 39
End: 2024-03-15
Payer: COMMERCIAL

## 2024-03-15 DIAGNOSIS — F41.1 GENERALIZED ANXIETY DISORDER: ICD-10-CM

## 2024-03-15 DIAGNOSIS — F33.1 MODERATE EPISODE OF RECURRENT MAJOR DEPRESSIVE DISORDER (H): ICD-10-CM

## 2024-03-15 RX ORDER — PHENTERMINE HYDROCHLORIDE 15 MG/1
15 CAPSULE ORAL EVERY MORNING
Qty: 30 CAPSULE | Refills: 0 | Status: SHIPPED | OUTPATIENT
Start: 2024-03-15

## 2024-03-15 RX ORDER — VENLAFAXINE HYDROCHLORIDE 150 MG/1
150 CAPSULE, EXTENDED RELEASE ORAL DAILY
Qty: 30 CAPSULE | Refills: 1 | Status: SHIPPED | OUTPATIENT
Start: 2024-03-15 | End: 2024-04-10

## 2024-03-15 NOTE — TELEPHONE ENCOUNTER
Date of Last Office Visit: 11/27/23  Date of Next Office Visit: 3/18/24  No shows since last visit: 0  Cancellations since last visit: 0    Medication requested: venlafaxine (EFFEXOR XR) 150 MG 24 hr capsule  Date last ordered: 10/17/23 Qty: 30 Refills: 3 ( ~ 27 days since end of script)     [place] ?: NA    Lapse in medication adherence greater than 5 days?: Possibly 27 days from 10/17/23 script.  If yes, call patient and gather details: RN attempted call to patient to confirm adherence as well as how patient is taking medication and reached Voice mail. RN left callback or [place] update via [employer] prior to next appointment with Dr. Bose.    Medication refill request verified as identical to current order?: Yes   Result of Last DAM, VPA, Li+ Level, CBC, or Carbamazepine Level (at or since last visit): N/A    Last visit treatment plan: 11/27/23    PLAN:      Patient advised of consultative model. Patient will continue to be seen for ongoing consultation and stabilization.  Does not meet criteria for involuntary treatment or hospitalization  Effexor  mg daily partial benefits => 10/17/23 187.5 mg daily-Risks, benefits and alternatives discussed.  Patient provides verbal consent to treatment.  Continue alprazolam 0.5 mg daily as needed anxiety-Risks, benefits and alternatives discussed.  Patient provides verbal consent to treatment.  Controlled substance rules sent by medical message and key rules discussed. Up to quantity 10/month  Trazodone 50 mg p.o. nightly as needed insomnia-provides well consent to treatment  Options-buspirone only with therapy, duloxetine, Viibryd  DC'd gabapentin (no need, lacking benefits at low dose)  Labs -no further labs indicated at this time  Referred for psychotherapy  Return in 6 to 8 weeks     Administrative Billing:   Time spent with patient was 30 minutes and greater than 50% of time or 20 minutes was spent in counseling and coordination of care regarding above diagnoses  and treatment plan.     Administrative Billing:   Time spent with patient was greater than 50% of time and/or significant time was spent in counseling and coordination of care regarding above diagnoses and treatment plan. Pre charting time and post charting time/documentation/coordination are done on date of service.       Signed:   Martin Bose M.D.  [facility]    []Medication refilled per  Medication Refill in Ambulatory Care  policy.  [x]Medication unable to be refilled by RN due to criteria not met as indicated below:    []Eligibility - not seen in the last year   []Supervision - no future appointment   [x]Compliance - no shows, cancellations or lapse in therapy    []Verification - order discrepancy   []Controlled medication   []Medication not included in policy   []90-day supply request   [x]Other - Psych appointment in 3 days.        -  Request made for only Effexor 150 MG. Patient should be taking 187.5 MG per 11/27/24  tx plan

## 2024-04-10 ENCOUNTER — MYC REFILL (OUTPATIENT)
Dept: INTERNAL MEDICINE | Facility: CLINIC | Age: 39
End: 2024-04-10
Payer: COMMERCIAL

## 2024-04-10 ENCOUNTER — MYC REFILL (OUTPATIENT)
Dept: PSYCHIATRY | Facility: CLINIC | Age: 39
End: 2024-04-10
Payer: COMMERCIAL

## 2024-04-10 DIAGNOSIS — G47.00 INSOMNIA, UNSPECIFIED TYPE: ICD-10-CM

## 2024-04-10 DIAGNOSIS — F33.1 MODERATE EPISODE OF RECURRENT MAJOR DEPRESSIVE DISORDER (H): ICD-10-CM

## 2024-04-10 DIAGNOSIS — F41.1 GENERALIZED ANXIETY DISORDER: ICD-10-CM

## 2024-04-10 RX ORDER — PHENTERMINE HYDROCHLORIDE 15 MG/1
15 CAPSULE ORAL EVERY MORNING
Qty: 30 CAPSULE | Refills: 0 | OUTPATIENT
Start: 2024-04-10

## 2024-04-10 RX ORDER — VENLAFAXINE HYDROCHLORIDE 150 MG/1
150 CAPSULE, EXTENDED RELEASE ORAL DAILY
Qty: 30 CAPSULE | Refills: 0 | Status: SHIPPED | OUTPATIENT
Start: 2024-04-10 | End: 2024-05-24

## 2024-04-10 RX ORDER — ALPRAZOLAM 0.5 MG
0.5 TABLET ORAL DAILY PRN
Qty: 10 TABLET | Refills: 0 | Status: SHIPPED | OUTPATIENT
Start: 2024-04-10 | End: 2024-05-08

## 2024-04-10 RX ORDER — TRAZODONE HYDROCHLORIDE 50 MG/1
50 TABLET, FILM COATED ORAL
Qty: 90 TABLET | Refills: 0 | Status: SHIPPED | OUTPATIENT
Start: 2024-04-10 | End: 2024-07-13

## 2024-04-10 NOTE — TELEPHONE ENCOUNTER
Date of Last Office Visit: 11/18/24  Date of Next Office Visit: None - RN forwarding encounter to [facility] for scheduling: recommended Return To Clinic: Around 1/13/24  No shows since last visit: 3/18/24  Cancellations since last visit: 0      Medication requested: ALPRAZolam (XANAX) 0.5 MG tablet Date last ordered: 10/17/23 Qty: 10 Refills: 3 ( PRN: 1 month 24 days since end of script)   traZODone (DESYREL) 50 MG tablet Date last ordered: 11/27/23 Qty: 90 Refills: 0  (PRN: 1 Month 14 days since end of script)   venlafaxine (EFFEXOR XR) 150 MG 24 hr cap Date last ordered: 10/17/23 Qty: 30 Refills: 3 ( 1 month 24 days since end of script)    [place]: ALPRAZolam (XANAX) 0.5 MG   Medication last sold date: 2/27/24 Qty filled: 10  [place] ?: Phentermine 15 Mg Capsule, Gabapentin 100 Mg Capsule  If yes, is this a new medication?: No  If yes, name of medication: NA and date filled: NA    Lapse in medication adherence greater than 5 days?: PRN  If yes, call patient and gather details: RN attempted call to patient to confirm adherence. RN reached Voice mail and left contact requesting patient update at [phone].   Medication refill request verified as identical to current order?: Yes  Result of Last DAM, VPA, Li+ Level, CBC, or Carbamazepine Level (at or since last visit): N/A    Last visit treatment plan: 11/27/23     PLAN:      Patient advised of consultative model. Patient will continue to be seen for ongoing consultation and stabilization.  Does not meet criteria for involuntary treatment or hospitalization  Effexor  mg daily partial benefits => 10/17/23 187.5 mg daily-Risks, benefits and alternatives discussed.  Patient provides verbal consent to treatment.  Continue alprazolam 0.5 mg daily as needed anxiety-Risks, benefits and alternatives discussed.  Patient provides verbal consent to treatment.  Controlled substance rules sent by medical message and key rules discussed.  Up to quantity 10/month  Trazodone 50 mg p.o. nightly as needed insomnia-provides well consent to treatment  Options-buspirone only with therapy, duloxetine, Viibryd  DC'd gabapentin (no need, lacking benefits at low dose)  Labs -no further labs indicated at this time  Referred for psychotherapy  Return in 6 to 8 weeks     Administrative Billing:   Time spent with patient was 30 minutes and greater than 50% of time or 20 minutes was spent in counseling and coordination of care regarding above diagnoses and treatment plan.     Administrative Billing:   Time spent with patient was greater than 50% of time and/or significant time was spent in counseling and coordination of care regarding above diagnoses and treatment plan. Pre charting time and post charting time/documentation/coordination are done on date of service.       Signed:   Martin Bose M.D.  [facility]    []Medication refilled per  Medication Refill in Ambulatory Care  policy.  [x]Medication unable to be refilled by RN due to criteria not met as indicated below:    []Eligibility - not seen in the last year   [x]Supervision - no future appointment   []Compliance - no shows, cancellations or lapse in therapy   []Verification - order discrepancy   [x]Controlled medication   []Medication not included in policy   []90-day supply request   []Other

## 2024-04-10 NOTE — TELEPHONE ENCOUNTER
Reason for call:  Other   Patient called regarding (reason for call): call back  Additional comments: Client retuning nursing call.   Please contact     Phone number to reach patient:  Home number on file [phone] (home)    Best Time:  any    Can we leave a detailed message on this number?  YES    Travel screening: Not Applicable

## 2024-05-01 ASSESSMENT — ANXIETY QUESTIONNAIRES
4. TROUBLE RELAXING: NEARLY EVERY DAY
2. NOT BEING ABLE TO STOP OR CONTROL WORRYING: NEARLY EVERY DAY
8. IF YOU CHECKED OFF ANY PROBLEMS, HOW DIFFICULT HAVE THESE MADE IT FOR YOU TO DO YOUR WORK, TAKE CARE OF THINGS AT HOME, OR GET ALONG WITH OTHER PEOPLE?: VERY DIFFICULT
GAD7 TOTAL SCORE: 19
6. BECOMING EASILY ANNOYED OR IRRITABLE: MORE THAN HALF THE DAYS
7. FEELING AFRAID AS IF SOMETHING AWFUL MIGHT HAPPEN: NEARLY EVERY DAY
3. WORRYING TOO MUCH ABOUT DIFFERENT THINGS: NEARLY EVERY DAY
1. FEELING NERVOUS, ANXIOUS, OR ON EDGE: NEARLY EVERY DAY
GAD7 TOTAL SCORE: 19
7. FEELING AFRAID AS IF SOMETHING AWFUL MIGHT HAPPEN: NEARLY EVERY DAY
5. BEING SO RESTLESS THAT IT IS HARD TO SIT STILL: MORE THAN HALF THE DAYS
GAD7 TOTAL SCORE: 19
IF YOU CHECKED OFF ANY PROBLEMS ON THIS QUESTIONNAIRE, HOW DIFFICULT HAVE THESE PROBLEMS MADE IT FOR YOU TO DO YOUR WORK, TAKE CARE OF THINGS AT HOME, OR GET ALONG WITH OTHER PEOPLE: VERY DIFFICULT

## 2024-05-01 ASSESSMENT — PATIENT HEALTH QUESTIONNAIRE - PHQ9
10. IF YOU CHECKED OFF ANY PROBLEMS, HOW DIFFICULT HAVE THESE PROBLEMS MADE IT FOR YOU TO DO YOUR WORK, TAKE CARE OF THINGS AT HOME, OR GET ALONG WITH OTHER PEOPLE: SOMEWHAT DIFFICULT
SUM OF ALL RESPONSES TO PHQ QUESTIONS 1-9: 11
SUM OF ALL RESPONSES TO PHQ QUESTIONS 1-9: 11

## 2024-05-01 NOTE — PROGRESS NOTES
Answers submitted by the patient for this visit:  Patient Health Questionnaire (Submitted on 5/1/2024)  If you checked off any problems, how difficult have these problems made it for you to do your work, take care of things at home, or get along with other people?: Somewhat difficult  PHQ9 TOTAL SCORE: 11  BRETT-7 (Submitted on 5/1/2024)  BRETT 7 TOTAL SCORE: 19          [facility]                                     Progress Note    Patient Name: Rebecca Fischer  Date: 5/02/24           Service Type: Individual      Session Start Time: 8am  Session End Time: 855am     Session Length: 55min    Session #: 4    Attendees: Client attended alone    Service Modality:  Video Visit:      Provider verified identity through the following two step process.  Patient provided:  Patient photo    Telemedicine Visit: The patient's condition can be safely assessed and treated via synchronous audio and visual telemedicine encounter.      Reason for Telemedicine Visit: Patient has requested telehealth visit    Originating Site (Patient Location): Patient's home    Distant Site (Provider Location): Provider Remote Setting- Home Office    Consent:  The patient/guardian has verbally consented to: the potential risks and benefits of telemedicine (video visit) versus in person care; bill my insurance or make self-payment for services provided; and responsibility for payment of non-covered services.     Patient would like the video invitation sent by:  Send to e-mail at: [email or web address]    Mode of Communication:  Video Conference via [employer]    Distant Location (Provider):  Off-site    As the provider I attest to compliance with applicable laws and regulations related to telemedicine.    DATA  Extended Session (53+ minutes): PROLONGED SERVICE IN THE OUTPATIENT SETTING REQUIRING DIRECT (FACE-TO-FACE) PATIENT CONTACT BEYOND THE USUAL SERVICE:    - Longer session due to limited access to mental health appointments and  necessity to address patient's distress / complexity  Interactive Complexity: No  Crisis: No      Progress Since Last Session (Related to Symptoms / Goals / Homework):   Symptoms: similar to last session    Homework: Completed in session      Episode of Care Goals: Minimal progress - PREPARATION (Decided to change - considering how); Intervened by negotiating a change plan and determining options / strategies for behavior change, identifying triggers, exploring social supports, and working towards setting a date to begin behavior change      Current / Ongoing Stressors and Concerns: She reflected on her experiences with work. She described her relationship with her , they are going to [employer]ist every week, doing devotions, helped in trust building. She described wanting to move toward a holistic approach to mental heat,is doing yoga, and reading. Has been titratiing Effexor, 150 mg, began titrating from 175. She is also prescribed taking trazadone 3x week, alazopram, 1-3 week. Discussed window of tolerance, hyperarousal, hyperarousal, described interventions for hyper and hypoarousal encouraged pt to practice these exercises in between sessions.     Treatment Objective(s) Addressed in This Session:   Identify thoughts, bodily sensations, feelings, urges  Identify toward moves, actions that align with her values  Identify away moves, actions that bring her further away from values, avoidant behavior      Intervention:   ACT therapy    Assessments completed prior to visit:  The following assessments were completed by patient for this visit:  PHQ9:       7/26/2022     5:13 PM 12/5/2022     3:20 PM 1/9/2023     5:58 PM 2/6/2023    11:17 AM 4/2/2023     1:56 PM 5/2/2023     2:45 PM 5/10/2023     9:13 AM   PHQ-9 SCORE   PHQ-9 Total Score Hai  17 (Moderately severe depression) 24 (Severe depression) 24 (Severe depression) 17 (Moderately severe depression) 20 (Severe depression) 20 (Severe depression)   PHQ-9 Total  Score 12 17 24 24 17 20 20    20     GAD7:       5/31/2022     9:17 AM 7/26/2022     5:13 PM 12/5/2022     3:20 PM 1/9/2023     5:58 PM 2/6/2023    11:17 AM 3/31/2023    11:25 AM 5/4/2023     9:16 AM   BRETT-7 SCORE   Total Score 14 (moderate anxiety)  21 (severe anxiety) 21 (severe anxiety) 21 (severe anxiety) 19 (severe anxiety) 21 (severe anxiety)   Total Score 14 17 21 21 21 19 21    21    21         ASSESSMENT: Current Emotional / Mental Status (status of significant symptoms):   Risk status (Self / Other harm or suicidal ideation)   Patient denies current fears or concerns for personal safety.   Patient denies current or recent suicidal ideation or behaviors.   Patient denies current or recent homicidal ideation or behaviors.   Patient denies current or recent self injurious behavior or ideation.   Patient denies other safety concerns.   Patient reports there has been no change in risk factors since their last session.     Patient reports there has been no change in protective factors since their last session.     Recommended that patient call 911 or go to the local ED should there be a change in any of these risk factors.     Appearance:   Appropriate    Eye Contact:   Good    Psychomotor Behavior: Normal    Attitude:   Cooperative    Orientation:   All   Speech    Rate / Production: Normal     Volume:  Normal    Mood:    Anxious    Affect:    Worrisome    Thought Content:  Clear    Thought Form:  Coherent  Logical    Insight:    Good      Medication Review:   No changes to current psychiatric medication(s)     Medication Compliance:   Yes     Changes in Health Issues:   None reported     Chemical Use Review:   Substance Use: Chemical use reviewed, no active concerns identified      Tobacco Use: No current tobacco use.      Diagnosis:  296.22 (F32.1)  Major Depressive Disorder, Single Episode, Moderate With anxious distress or 309.81 (F43.10)   BRETT    R/o Posttraumatic Stress Disorder (includes Posttraumatic  Stress Disorder, Without dissociative symptoms    Collateral Reports Completed:   Not Applicable    PLAN: (Patient Tasks / Therapist Tasks / Other):  encouraged pt to practice interventions for hyper and hypoarousal encouraged pt to practice these exercises in between sessions.        JARRET Ahn, [facility], 5/02/24                                                      Individual Treatment Plan    Patient's Name: Rebecca Fischer  YOB: 1985    Date of Creation: 5.25.23  Date Treatment Plan Last Reviewed/Revised: 5.25.23    DSM5 Diagnoses: 296.22 (F32.1)  Major Depressive Disorder, Single Episode, Moderate With anxious distress or 309.81 (F43.10) Posttraumatic Stress Disorder (includes Posttraumatic Stress Disorder for Children 6 Years and Younger)  Without dissociative symptoms  Psychosocial / Contextual Factors: panic symptoms, significant family/work/school responsibilities   PROMIS (reviewed every 90 days):     Referral / Collaboration:  Referral to another professional/service is not indicated at this time..    Anticipated number of session for this episode of care:  12+  Anticipation frequency of session: Biweekly  Anticipated Duration of each session:  38-52, 53+ when needed  Treatment plan will be reviewed in 90 days or when goals have been changed.     Goal 2: Client will decrease her depressive feelings from a baseline PHQ-9 score of 15 to a post score of 5.     I will know I've met my goal when I have more energy and feel genuinely better about myself.       Objective #A              Client increase her understanding of depressive feelings, including developing vocabulary to describe depression and identify cues and symptoms. Client will also identify areas of vulnerability that make her symptoms increase in frequency and intensity.  Status:Status: New - Date: 5.25.23   Intervention(s)  Therapist will provide educational materials on depression anxiety and help to identify clients  cues and symptoms.      Objective #B  Client will Increase interest, engagement, and pleasure in doing things  Decrease frequency and intensity of feeling down, depressed, hopeless.  Status: Status: New - Date: 5.25.23     Intervention(s)  Therapist will provide educational materials on CBT and behavioral activation, including thought logs and activity logs.     Objective #C  Client will identify negative self-talk and behaviors: challenge core beliefs, myths, and actions.   Status: Status: New - Date: 5.25.23      Intervention(s)  Therapist will provide educational materials on core beliefs, cognitive distortions.  teach emotional regulation skills. Including accepting emotions and thoughts.     Goal 2: Client will increase her understanding and comprehension of PTSD.     I will know I've met my goal when I am able to put meaning behind what I am feeling.       Objective #A    Status: New - Date: 5.25.23     Client will increasing her understanding of PTSD.      Intervention(s)  Therapist will provide educational materials on PTSD, including providing vocabulary to describe feelings associated with PTSD.     Objective #B  Client will Identify cues and symptoms related to PTSD.                    Status: New - Date: 5.25.23     Intervention(s)  Therapist will provide educational materials on PTSD cues and symptoms.  teach the client how to perform a behavioral chain analysis.      Objective #C  Client will increase her coping and grounding skills to stay emotionally regulated during distressing situations.  Status: New - Date: 5.25.23     Intervention(s)  Therapist will teach coping and grounding skills and present examples during session.    Patient has reviewed and agreed to the above plan.      JARRET Ahn, [facility] May 25 2023

## 2024-05-02 ENCOUNTER — VIRTUAL VISIT (OUTPATIENT)
Dept: PSYCHOLOGY | Facility: CLINIC | Age: 39
End: 2024-05-02
Payer: COMMERCIAL

## 2024-05-02 DIAGNOSIS — F41.1 GENERALIZED ANXIETY DISORDER: Primary | ICD-10-CM

## 2024-05-02 PROCEDURE — 90837 PSYTX W PT 60 MINUTES: CPT | Mod: 95 | Performed by: SOCIAL WORKER

## 2024-05-08 ENCOUNTER — MYC REFILL (OUTPATIENT)
Dept: PSYCHIATRY | Facility: CLINIC | Age: 39
End: 2024-05-08
Payer: COMMERCIAL

## 2024-05-08 DIAGNOSIS — F41.1 GENERALIZED ANXIETY DISORDER: ICD-10-CM

## 2024-05-09 RX ORDER — ALPRAZOLAM 0.5 MG
0.5 TABLET ORAL DAILY PRN
Qty: 10 TABLET | Refills: 0 | Status: SHIPPED | OUTPATIENT
Start: 2024-05-09 | End: 2024-05-24

## 2024-05-09 NOTE — TELEPHONE ENCOUNTER
Date of Last Office Visit: 11/27/2023  Date of Next Office Visit:  5/24/2024  No shows since last visit: Yes, 1 on 3/18/2024  More than 2 cancellations since last visit? No      Medication requested:  alprazolam (XANAX) 0.5 MG tablet  Date last ordered: 4/10/2024 Qty: 10 Refills: 0  Take 1 tablet (0.5 mg) by mouth daily as needed for anxiety Don't mix with alcohol. No early refills.     Controlled Substance(s)? Yes   Per [place] :  Medication last sold date: 4/11/2024 Qty filled: 10  Other [place] on [place] ?: Yes   If yes, is this a new medication? No  Filled  Written  Sold  ID  Drug  QTY  Days  Prescriber  RX #  Dispenser  Refill  Daily Dose*  Pymt Type      04/10/2024 04/10/2024 04/11/2024 1 Alprazolam 0.5 Mg Tablet 10.00 10 Mi Juanita [id number] Sup (1828) 0/0 1.00 LME Comm [place]   03/15/2024 03/15/2024 03/16/2024 1 Phentermine 15 Mg Capsule 30.00 30 An [facility] [id number] Sup (1828) 0/0  Comm [facility]   02/22/2024 10/17/2023 02/27/2024 1 Alprazolam 0.5 Mg Tablet 10.00 10 Mi Juanita [id number] Sup (1828) 3/3 1.00 LME Comm [facility]   01/13/2024 01/12/2024 01/20/2024 1 Phentermine 15 Mg Capsule 30.00 30 [facility] [id number] Sup (1828) 0/0  Comm [facility]   01/03/2024 10/17/2023 01/09/2024 1 Alprazolam 0.5 Mg Tablet 10.00 10 Mi Juanita [id number] Sup (1828) 2/3 1.00 LME Comm [facility]   12/13/2023 12/13/2023 12/17/2023 1 Phentermine 15 Mg Capsule 30.00 30 An [facility] [id number] Sup (1828) 0/0  Comm [facility]   11/21/2023 10/17/2023 11/21/2023 1 Alprazolam 0.5 Mg Tablet 10.00 10 Mi Juanita             Lapse in medication adherence greater than 5 days?: No  Medication refill request verified as identical to current order?: Yes    Medication requires lab monitoring? No    Last visit treatment plan:   PLAN:     Patient advised of consultative model. Patient will continue to be seen for ongoing consultation and stabilization.  Does not meet criteria for involuntary treatment or hospitalization  Effexor  mg daily partial benefits => 10/17/23 187.5 mg  daily-Risks, benefits and alternatives discussed.  Patient provides verbal consent to treatment.  Continue alprazolam 0.5 mg daily as needed anxiety-Risks, benefits and alternatives discussed.  Patient provides verbal consent to treatment.  Controlled substance rules sent by medical message and key rules discussed. Up to quantity 10/month  Trazodone 50 mg p.o. nightly as needed insomnia-provides well consent to treatment  Options-buspirone only with therapy, duloxetine, Viibryd  DC'd gabapentin (no need, lacking benefits at low dose)  Labs -no further labs indicated at this time  Referred for psychotherapy  Return in 6 to 8 weeks    []Medication refilled per  Medication Refill in Ambulatory Care  policy.  [x]Medication unable to be refilled by RN due to criteria not met as indicated below:    []Eligibility - has not had a provider visit within last 6 months   []Supervision - no future appointment   []Compliance - no shows, cancellations, or lapse in therapy   []Verification - order discrepancy, or needs modified sig.    []90-day supply request   []Advanced refill request   [x]Controlled medication   []Medication not included in policy   []Review - new medication, med was adjusted within last 30 days, or pt   may need labs drawn    []Other    A quantity of 10 is pended for provider review.    Jossy Hernández RN on 5/9/2024 at 8:46 AM

## 2024-05-23 NOTE — PROGRESS NOTES
[facility]  May 24, 2024      Behavioral Health Clinician Progress Note    Patient Name: Rebecca Fischer           Service Type:  Individual      Service Location:   [place] / Email (patient reached)     Session Start Time: 836am  Session End Time: 904am      Session Length: 16 - 37      Attendees: Patient     Service Modality:  Video Visit:      Provider verified identity through the following two step process.  Patient provided:  Patient is known previously to provider and Patient was verified at admission/transfer    Telemedicine Visit: The patient's condition can be safely assessed and treated via synchronous audio and visual telemedicine encounter.      Reason for Telemedicine Visit: Services only offered telehealth    Originating Site (Patient Location): Patient's home    Distant Site (Provider Location): Provider Remote Setting- Home Office    Consent:  The patient/guardian has verbally consented to: the potential risks and benefits of telemedicine (video visit) versus in person care; bill my insurance or make self-payment for services provided; and responsibility for payment of non-covered services.     Patient would like the video invitation sent by:  My Chart    Mode of Communication:  Video Conference via [facility]    Distant Location (Provider):  Off-site    As the provider I attest to compliance with applicable laws and regulations related to telemedicine.    Visit Activities (Refresh list every visit): [facility] Only    Diagnostic Assessment Date: 5/3/2023 Javed Kimbrough  Treatment Plan Review Date: 8/22/24  See Flowsheets for today's PHQ-9 and BRETT-7 results  Previous PHQ-9:       12/13/2023     8:58 AM 5/1/2024    11:03 AM 5/24/2024     8:28 AM   PHQ-9 SCORE   PHQ-9 Total Score MyChart 12 (Moderate depression) 11 (Moderate depression) 7 (Mild depression)   PHQ-9 Total Score 12 11 7   See PHQ-2.     Previous BRETT-7:       3/15/2024    10:00 AM  5/1/2024    11:04 AM 5/24/2024     8:29 AM   BRETT-7 SCORE   Total Score 15 (severe anxiety) 19 (severe anxiety) 17 (severe anxiety)   Total Score 15    15 19 17    17       IMANI LEVEL:      9/29/2022     7:48 AM   IMANI Score (Last Two)   IMANI Raw Score 36   Activation Score 75.5   IMANI Level 4       DATA  Extended Session (60+ minutes): No  Interactive Complexity: No  Crisis: No  [facility] Patient: No    Treatment Objective(s) Addressed in This Session:  Target Behavior(s):  continue to get outside, redirect thoughts and  use skills to mange anxiety    Anxiety: will experience a reduction in anxiety, will develop more effective coping skills to manage anxiety symptoms, and will increase ability to function adaptively  Psychological distress related to Sleep Disturbance    Current Stressors / Issues:   Questions/Thoughts for Dr. Bose:    Better/worse: anxiety is still the same, sleep is difficult to fall asleep and over night waking   ADLs: Sleep- good, depends it pt will think about it too much they will have trouble falling asleep, if wake up it's hard to get back to sleep, sleep with fan, have a routine to wind down, will do lotion before bed and shower, she will stay in bed, she will go to bed at a certain time   Current Symptoms: Pt reports they are doing the same skills to help cope. Deep breathing, medication, getting outside and exercise and they help a little. They haven't felt as down and depressed. Anxiety is still difficult to manage. Something's will trigger pt and it comes and goes. I could be related to over thinking and looking ahead to the future. She will try to switch gears when feeling anxious and will clean and keep themselves busy. On a 1-10 scale her anxiety varies it will be a 2 and then after a week go to 8-9 on a 1-10 scale 1 being low anxiety and 10 being intense. Pt has tried to do meditations that are guided. She has been able to be social. There is still a worry about other drivers and their  actions.     Mood: good, better, still can get irritable and it is better though, not as down    Therapist: trying, have a difficult work schedule and pt has needed to cancel visit, pt did meet with them recently and have an appt set up   [facility] recommendations: Amanda insight timer, simple habit, Atmosphere, PMR     Progress towards goals: New Goal to get outside, do self care and practice meditations      Progress on Treatment Objective(s) / Homework:  No improvement - ACTION (Actively working towards change); Intervened by reinforcing change plan / affirming steps taken     CBT/MI: Pt is using different coping skills to manage anxiety and racing thoughts. Pt reports that they have spent time outside and have found this helpful for improving mood.     [facility] provides pt with different apps to practice mindfulness and guided meditations to help with anxiety at pt's request. [facility] demonstrates and explains PMR to help with tension in the body. [facility] reviews sleep hygiene and recommends pt to get out of bed and go to the living room and do something relaxing but nothing that will hold their attention to help them get back to sleep.     Motivational Interviewing    MI Intervention: Co-Developed Goal: improve anxiety and sleep, Expressed Empathy/Understanding, Supported Autonomy, Collaboration, Evocation, Permission to raise concern or advise, Open-ended questions, Reflections: simple and complex, Change talk (evoked), and Reframe     Change Talk Expressed by the Patient: Committment to change Activation Taking steps    Provider Response to Change Talk: E - Evoked more info from patient about behavior change, A - Affirmed patient's thoughts, decisions, or attempts at behavior change, R - Reflected patient's change talk, and S - Summarized patient's change talk statements    Also provided psychoeducation about behavioral health condition, symptoms, and treatment options. [facility] provides psychoeducation about anxiety.     Assessments  completed prior to visit:  The following assessments were completed by patient for this visit:   PHQ2:       11/27/2023     8:58 AM 7/25/2023    10:07 AM 2/6/2023    11:17 AM 1/9/2023     5:58 PM 12/5/2022     3:20 PM 5/31/2022     9:18 AM 5/1/2022     6:01 PM   PHQ-2 ( 1999 [employer])   Q1: Little interest or pleasure in doing things 1 1     1   Q2: Feeling down, depressed or hopeless 1 1     1   PHQ-2 Score 2 2     2   Q1: Little interest or pleasure in doing things  Several days     Several days   Q2: Feeling down, depressed or hopeless  Several days     Several days   PHQ-2 Score  2 Incomplete Incomplete Incomplete Incomplete 2     GAD7:       7/5/2023     6:07 PM 7/19/2023    12:19 PM 10/10/2023     9:59 AM 11/24/2023    11:02 AM 3/15/2024    10:00 AM 5/1/2024    11:04 AM 5/24/2024     8:29 AM   BRETT-7 SCORE   Total Score 18 (severe anxiety) 18 (severe anxiety) 21 (severe anxiety) 18 (severe anxiety) 15 (severe anxiety) 19 (severe anxiety) 17 (severe anxiety)   Total Score 18 18 21 18    18 15    15 19 17    17     GAD2:       7/19/2023    12:19 PM 7/25/2023    10:07 AM 10/10/2023     9:59 AM 11/24/2023    11:02 AM 3/15/2024    10:00 AM 5/1/2024    11:04 AM 5/24/2024     8:29 AM   BRETT-2   Feeling nervous, anxious, or on edge 3 3 3 3 3 3 2   Not being able to stop or control worrying 3 3 3 3 3 3 2   BRETT-2 Total Score 6 6 6 6    6 6    6 6 4    4       Care Plan review completed: Yes    Medication Review:  No changes to current psychiatric medication(s)    Medication Compliance:  Yes    Changes in Health Issues:   None reported    Chemical Use Review:   Substance Use: Chemical use reviewed, no active concerns identified      Tobacco Use: No current tobacco use.      Assessment: Current Emotional / Mental Status (status of significant symptoms):  Risk status (Self / Other harm or suicidal ideation)  Patient has had a history of suicidal ideation: In high school and suicide attempts: in high school according to Connie  Orgill note 5/10/2023  Patient denies current fears or concerns for personal safety.  Patient denies current or recent suicidal ideation or behaviors.  Patient denies current or recent homicidal ideation or behaviors.  Patient denies current or recent self injurious behavior or ideation.  Patient denies other safety concerns.  A safety and risk management plan has not been developed at this time, however patient was encouraged to call [place] [phone] should there be a change in any of these risk factors.    Appearance:   Appropriate , well groomed  Eye Contact:   Good   Psychomotor Behavior: Normal t, seated  Attitude:   Cooperative  Interested Friendly Pleasant  Orientation:   All  Speech   Rate / Production: Normal    Volume:  Normal   Mood:    Anxious   Affect:    Appropriate   Thought Content:  Clear   Thought Form:  Coherent  Logical   Insight:    Good     Diagnoses:  1. Moderate episode of recurrent major depressive disorder (H)    2. Generalized anxiety disorder        Collateral Reports Completed:  Communicated with: Martin Bose M.D.     Plan: (Homework, other):  Patient was given information about behavioral services and encouraged to schedule a follow up appointment with the clinic [facility] in 1 month.  She was also given information about mental health symptoms and treatment options .  CD Recommendations: No indications of CD issues.   Milla Hendrix, [facility]    _____________________________________________                                              Individual Treatment Plan    Patient's Name: Rebecca Fischer   YOB: 1985  Date of Creation: 5/24/2024  Date Treatment Plan Last Reviewed/Revised: 5/24/2024    DSM5 Diagnoses:   1. Moderate episode of recurrent major depressive disorder (H)    2. Generalized anxiety disorder      Psychosocial / Contextual Factors: has children, is , works, has supports with family   PROMIS (reviewed every 90 days):   PROMIS 10-Global Health (only  subscores and total score):       3/31/2023    11:27 AM 4/3/2023     9:12 AM 5/4/2023     9:17 AM 10/10/2023    10:00 AM 3/15/2024    10:01 AM   PROMIS-10 Scores Only   Global Mental Health Score 8 8 8    8 7 11    11   Global Physical Health Score 12 13 13    13 13 15    15   PROMIS TOTAL - SUBSCORES 20 21 21    21 20 26    26        Referral / Collaboration:  Referral to another professional/service is not indicated at this time.    Anticipated number of session for this episode of care: 3-6 sessions  Anticipation frequency of session: Monthly  Anticipated Duration of each session: 16-37 minutes  Treatment plan will be reviewed in 90 days or when goals have been changed.       MeasurableTreatment Goal(s) related to diagnosis / functional impairment(s)  Goal 1: Patient will manage anxiety and depressive sx    I will know I've met my goal when I am taking more time outside and self care and plan more self care into my busy life and working on meditation skill.      Objective #A (Patient Action)    Patient will spend more time outside and practice self care and working on meditation skills and report progress each visit.   Status: New - Date: 5/24/2024      Intervention(s)  [facility] will provide support through CBT, MI, Acceptance and Commitment Therapy, Dialectic Behavioral Therapy and problem solving model to explore and overcome barriers.      Patient has reviewed and agreed to the above plan.    Written by  Milla Hendrix [facility], [facility]

## 2024-05-24 ENCOUNTER — VIRTUAL VISIT (OUTPATIENT)
Dept: PSYCHIATRY | Facility: CLINIC | Age: 39
End: 2024-05-24
Payer: COMMERCIAL

## 2024-05-24 ENCOUNTER — VIRTUAL VISIT (OUTPATIENT)
Dept: BEHAVIORAL HEALTH | Facility: CLINIC | Age: 39
End: 2024-05-24
Payer: COMMERCIAL

## 2024-05-24 DIAGNOSIS — F33.1 MODERATE EPISODE OF RECURRENT MAJOR DEPRESSIVE DISORDER (H): ICD-10-CM

## 2024-05-24 DIAGNOSIS — F41.1 GENERALIZED ANXIETY DISORDER: ICD-10-CM

## 2024-05-24 DIAGNOSIS — F41.1 GENERALIZED ANXIETY DISORDER: Primary | ICD-10-CM

## 2024-05-24 DIAGNOSIS — F33.1 MODERATE EPISODE OF RECURRENT MAJOR DEPRESSIVE DISORDER (H): Primary | ICD-10-CM

## 2024-05-24 PROCEDURE — 90832 PSYTX W PT 30 MINUTES: CPT | Mod: 95 | Performed by: COUNSELOR

## 2024-05-24 PROCEDURE — 99214 OFFICE O/P EST MOD 30 MIN: CPT | Mod: 95 | Performed by: PSYCHIATRY & NEUROLOGY

## 2024-05-24 RX ORDER — VENLAFAXINE HYDROCHLORIDE 37.5 MG/1
37.5 CAPSULE, EXTENDED RELEASE ORAL DAILY
Qty: 30 CAPSULE | Refills: 0 | Status: SHIPPED | OUTPATIENT
Start: 2024-05-24 | End: 2024-07-24 | Stop reason: DRUGHIGH

## 2024-05-24 RX ORDER — VENLAFAXINE HYDROCHLORIDE 75 MG/1
75 CAPSULE, EXTENDED RELEASE ORAL DAILY
Qty: 30 CAPSULE | Refills: 1 | Status: SHIPPED | OUTPATIENT
Start: 2024-05-24 | End: 2024-07-13

## 2024-05-24 RX ORDER — ALPRAZOLAM 0.5 MG
0.5 TABLET ORAL DAILY PRN
Qty: 10 TABLET | Refills: 2 | Status: SHIPPED | OUTPATIENT
Start: 2024-06-09 | End: 2024-07-24

## 2024-05-24 ASSESSMENT — PATIENT HEALTH QUESTIONNAIRE - PHQ9
SUM OF ALL RESPONSES TO PHQ QUESTIONS 1-9: 7
SUM OF ALL RESPONSES TO PHQ QUESTIONS 1-9: 7
10. IF YOU CHECKED OFF ANY PROBLEMS, HOW DIFFICULT HAVE THESE PROBLEMS MADE IT FOR YOU TO DO YOUR WORK, TAKE CARE OF THINGS AT HOME, OR GET ALONG WITH OTHER PEOPLE: SOMEWHAT DIFFICULT

## 2024-05-24 ASSESSMENT — ANXIETY QUESTIONNAIRES
1. FEELING NERVOUS, ANXIOUS, OR ON EDGE: MORE THAN HALF THE DAYS
5. BEING SO RESTLESS THAT IT IS HARD TO SIT STILL: NEARLY EVERY DAY
GAD7 TOTAL SCORE: 17
2. NOT BEING ABLE TO STOP OR CONTROL WORRYING: MORE THAN HALF THE DAYS
4. TROUBLE RELAXING: NEARLY EVERY DAY
5. BEING SO RESTLESS THAT IT IS HARD TO SIT STILL: NEARLY EVERY DAY
7. FEELING AFRAID AS IF SOMETHING AWFUL MIGHT HAPPEN: MORE THAN HALF THE DAYS
8. IF YOU CHECKED OFF ANY PROBLEMS, HOW DIFFICULT HAVE THESE MADE IT FOR YOU TO DO YOUR WORK, TAKE CARE OF THINGS AT HOME, OR GET ALONG WITH OTHER PEOPLE?: VERY DIFFICULT
GAD7 TOTAL SCORE: 17
8. IF YOU CHECKED OFF ANY PROBLEMS, HOW DIFFICULT HAVE THESE MADE IT FOR YOU TO DO YOUR WORK, TAKE CARE OF THINGS AT HOME, OR GET ALONG WITH OTHER PEOPLE?: VERY DIFFICULT
GAD7 TOTAL SCORE: 17
6. BECOMING EASILY ANNOYED OR IRRITABLE: MORE THAN HALF THE DAYS
1. FEELING NERVOUS, ANXIOUS, OR ON EDGE: MORE THAN HALF THE DAYS
GAD7 TOTAL SCORE: 17
GAD7 TOTAL SCORE: 17
4. TROUBLE RELAXING: NEARLY EVERY DAY
7. FEELING AFRAID AS IF SOMETHING AWFUL MIGHT HAPPEN: MORE THAN HALF THE DAYS
3. WORRYING TOO MUCH ABOUT DIFFERENT THINGS: NEARLY EVERY DAY
GAD7 TOTAL SCORE: 17
7. FEELING AFRAID AS IF SOMETHING AWFUL MIGHT HAPPEN: MORE THAN HALF THE DAYS
IF YOU CHECKED OFF ANY PROBLEMS ON THIS QUESTIONNAIRE, HOW DIFFICULT HAVE THESE PROBLEMS MADE IT FOR YOU TO DO YOUR WORK, TAKE CARE OF THINGS AT HOME, OR GET ALONG WITH OTHER PEOPLE: VERY DIFFICULT
2. NOT BEING ABLE TO STOP OR CONTROL WORRYING: MORE THAN HALF THE DAYS
6. BECOMING EASILY ANNOYED OR IRRITABLE: MORE THAN HALF THE DAYS
3. WORRYING TOO MUCH ABOUT DIFFERENT THINGS: NEARLY EVERY DAY
IF YOU CHECKED OFF ANY PROBLEMS ON THIS QUESTIONNAIRE, HOW DIFFICULT HAVE THESE PROBLEMS MADE IT FOR YOU TO DO YOUR WORK, TAKE CARE OF THINGS AT HOME, OR GET ALONG WITH OTHER PEOPLE: VERY DIFFICULT
7. FEELING AFRAID AS IF SOMETHING AWFUL MIGHT HAPPEN: MORE THAN HALF THE DAYS

## 2024-05-24 ASSESSMENT — PAIN SCALES - GENERAL: PAINLEVEL: NO PAIN (0)

## 2024-05-24 NOTE — PROGRESS NOTES
"Virtual Visit Details    Type of service:  Video Visit     Originating Location (pt. Location): Home    Distant Location (provider location):  On-site  Platform used for Video Visit: [facility] Psychiatry Consult Note    IDENTIFICATION   Name: Rebecca Fischer   : 1985/38 year old      Sex:    @ female          Telemedicine Visit: The patient's condition can be safely assessed and treated via synchronous audio and visual telemedicine encounter.        Face to Face/patient Contact total time: 19 minutes  Pre Charting time: 4 minutes; Post charting time, communication and other activities: 2 minutes; Total time 25 minutes  9:10 AM -9:29 AM          SUBJECTIVE     History of Present Illness  The patient presents via virtual visit for evaluation of anxiety and depression.    The patient's anxiety remains relatively challenging, despite maintaining a consistent dosage of 150 mg of Effexor. She expresses a desire to discontinue her medication if possible, citing a preference for a holistic approach. Despite her struggle with depression, she believes she manages her anxiety effectively and is under the care of a therapist. Her anxiety is characterized by restlessness, fidgeting, abnormal breathing, and tachycardia. She identifies a pecking haird as a distraction mechanism during severe anxiety episodes, which she manages with alprazolam 1 to 2 times per week. The severity of her anxiety varies, sometimes occurring daily but can occur a few times per week. She denies experiencing \"brain zaps\" when missing or late doses of Effexor.    Supplemental Information  She takes trazodone 3 to 5 times a week.        The following assessments were completed by patient for this visit:  PROMIS 10-Global Health (only subscores and total score):       3/31/2023    11:27 AM 4/3/2023     9:12 AM 2023     9:17 AM 10/10/2023    10:00 AM 3/15/2024    10:01 AM   PROMIS-10 Scores Only   Global Mental Health Score 8 8 " 8    8 7 11    11   Global Physical Health Score 12 13 13    13 13 15    15   PROMIS TOTAL - SUBSCORES 20 21 21    21 20 26    26             12/13/2023     8:58 AM 5/1/2024    11:03 AM 5/24/2024     8:28 AM   PHQ   PHQ-9 Total Score 12 11 7   Q9: Thoughts of better off dead/self-harm past 2 weeks Not at all Not at all Not at all          3/15/2024    10:00 AM 5/1/2024    11:04 AM 5/24/2024     8:29 AM   BRETT-7 SCORE   Total Score 15 (severe anxiety) 19 (severe anxiety) 17 (severe anxiety)   Total Score 15    15 19 17    17        OBJECTIVE     Vital Signs:   There were no vitals taken for this visit.    Labs:    Results            Current Medications:  Current Outpatient Medications   Medication Sig Dispense Refill    ALPRAZolam (XANAX) 0.5 MG tablet Take 1 tablet (0.5 mg) by mouth daily as needed for anxiety Don't mix with alcohol. No early refills. 10 tablet 0    ECHINACEA EXTRACT PO       levonorgestrel (MIRENA) 20 MCG/24HR IUD 1 each (20 mcg) by Intrauterine route once      Multiple Vitamins-Minerals (EMERGEN-C IMMUNE PO)  (Patient not taking: Reported on 12/13/2023)      phentermine 15 MG capsule Take 1 capsule (15 mg) by mouth every morning 30 capsule 0    Prenatal Vit-Fe Fumarate-FA (PRENATAL VITAMIN AND MINERAL PO)       traZODone (DESYREL) 50 MG tablet Take 1 tablet (50 mg) by mouth nightly as needed for sleep 90 tablet 0    venlafaxine (EFFEXOR XR) 150 MG 24 hr capsule Take 1 capsule (150 mg) by mouth daily Take along with a 37.5 mg pill for a total of 187.5 mg daily 30 capsule 0    venlafaxine (EFFEXOR XR) 37.5 MG 24 hr capsule Take 1 capsule (37.5 mg) by mouth daily Take along with a 150 mg pill for a total of 187.5 mg daily 30 capsule 3     No current facility-administered medications for this visit.            ADDED HISTORY       Physical exam    Physical Exam            MENTAL STATUS EXAMINATION:   Appearance: Intact attention to grooming and hygiene  Attitude: Cooperative  Eye Contact: Good  Gait and  Station: Sitting  Psychomotor Behavior: Within normal limits  Oriented to: Grossly person place or time  Attention Span and Concentration: Grossly intact  Speech: Within normal limits  Language: English  Mood:  mildly anxious  Affect: Mildly constricted  Associations:  no loose associations  Thought Process:  logical, linear and goal oriented  Thought Content: No evidence of delusions or suicidal or homicidal ideation plan or intent  Memory: Grossly intact  Fund of Knowledge: Good  Insight:  good  Judgment:  intact, adequate for safety  Impulse Control:  intact        DIAGNOSES:   Major depressive disorder, moderate, recurrent, in partial remission  Generalized anxiety disorder      ASSESSMENT:   Patient with improvements in depression and anxiety with Effexor titration.  Plan was to titrate, her preference is to attempt holistic treatment rather than medication and tapering initiated as per patient preference.  Was advised of potential for difficulty.  She is undergoing psychotherapy so is to receive the recommended treatment regardless.    Today Rebecca Fischer reports no suicidal ideations. In addition, she has notable risk factors for self-harm, including anxiety. However, risk is mitigated by commitment to family, Orthodoxy beliefs, and absence of past attempts. Therefore, based on all available evidence including the factors cited above, she does not appear to be at imminent risk for self-harm, does not meet criteria for a 72-hr hold, and therefore remains appropriate for ongoing outpatient level of care.       PLAN:     Patient advised of consultative model. Patient will continue to be seen for ongoing consultation and stabilization.  Does not meet criteria for involuntary treatment or hospitalization  Effexor  mg daily partial benefits => 5/24/24 112.5 mg x 1 month and 75 mg daily-Risks, benefits and alternatives discussed.  Patient provides verbal consent to treatment.  Continue alprazolam 0.5 mg  daily as needed anxiety-Risks, benefits and alternatives discussed.  Patient provides verbal consent to treatment.  Controlled substance rules sent by medical message and key rules discussed. Up to quantity 10/month  Trazodone 50 mg p.o. nightly as needed insomnia-provides well consent to treatment  Options-buspirone only with therapy, duloxetine, Viibryd  DC'd gabapentin (no need, lacking benefits at low dose)  Labs -no further labs indicated at this time  Referred for acupuncture for mental health  Undergoing psychotherapy with CHASE Kimbrough  Return in 6 to 8 weeks  Assessment & Plan  1. Anxiety and depression.  A prescription for Effexor 37.5 mg, to be taken in conjunction with a 75 mg pill, has been issued for a duration of 1 month. Subsequently, the dosage will be further reduced to 75 mg. Additionally, a refill of Xanax has been provided, with the next refill scheduled for early 06/10/2024. The patient has been advised to continue her therapy sessions, ensure she has all her skills and strategies in place, and learn how to navigate through and accept her feelings without fighting them. A referral for acupuncture has been made. The patient has been advised to communicate with Estrella about her holistic treatments. Should her anxiety and depression intensify, she has been advised to inform us immediately.    Follow-up  The patient is scheduled for a follow-up visit in 6 to 8 weeks.      Administrative Billing:   Time spent with patient was greater than 50% of time and/or significant time was spent in counseling and coordination of care regarding above diagnoses and treatment plan. Pre charting time and post charting time/documentation/coordination are done on date of service.      Signed:   Martin Bose M.D.  [facility] Psychiatry Service    Disclaimer: This note consists of symbols derived from keyboarding, dictation and/or voice recognition software. As a result, there may be errors in the script that have  gone undetected. Please consider this when interpreting information found in this chart.    Consent was obtained from the patient to use an AI documentation tool in the creation of this note.     eoc

## 2024-05-24 NOTE — NURSING NOTE
Is the patient currently in the state of [place]? YES    Visit mode:VIDEO    If the visit is dropped, the patient can be reconnected by: VIDEO VISIT: Text to cell phone:   Telephone Information:   Mobile [phone]    and VIDEO VISIT: Send to e-mail at: [email or web address]    Will anyone else be joining the visit? NO  (If patient encounters technical issues they should call [phone])    How would you like to obtain your AVS? MyChart    Are changes needed to the allergy or medication list? Pt stated no changes to allergies and Pt stated no med changes    Are refills needed on medications prescribed by this physician? YES    Reason for visit: CYDNEY ROJAS

## 2024-07-13 ENCOUNTER — MYC REFILL (OUTPATIENT)
Dept: PSYCHIATRY | Facility: CLINIC | Age: 39
End: 2024-07-13
Payer: COMMERCIAL

## 2024-07-13 DIAGNOSIS — F41.1 GENERALIZED ANXIETY DISORDER: ICD-10-CM

## 2024-07-13 DIAGNOSIS — F33.1 MODERATE EPISODE OF RECURRENT MAJOR DEPRESSIVE DISORDER (H): ICD-10-CM

## 2024-07-13 DIAGNOSIS — G47.00 INSOMNIA, UNSPECIFIED TYPE: ICD-10-CM

## 2024-07-13 RX ORDER — ALPRAZOLAM 0.5 MG
0.5 TABLET ORAL DAILY PRN
Qty: 10 TABLET | Refills: 2 | Status: CANCELLED | OUTPATIENT
Start: 2024-07-13

## 2024-07-15 RX ORDER — VENLAFAXINE HYDROCHLORIDE 75 MG/1
75 CAPSULE, EXTENDED RELEASE ORAL DAILY
Qty: 30 CAPSULE | Refills: 1 | Status: SHIPPED | OUTPATIENT
Start: 2024-07-15 | End: 2024-07-24

## 2024-07-15 RX ORDER — TRAZODONE HYDROCHLORIDE 50 MG/1
50 TABLET, FILM COATED ORAL
Qty: 90 TABLET | Refills: 0 | Status: SHIPPED | OUTPATIENT
Start: 2024-07-15 | End: 2024-07-24

## 2024-07-15 NOTE — TELEPHONE ENCOUNTER
Date of Last Office Visit: 5/24/24  Date of Next Office Visit:  7/24/24  No shows since last visit: No  More than one patient-initiated cancellation (with reschedule) since last seen in clinic? No    []Medication refilled per  Medication Refill in Ambulatory Care  policy.  [x]Medication unable to be refilled by RN due to criteria not met as indicated below:    []Eligibility: has not had a provider visit within last 6 months   []Supervision: no future appointment; < 7 days before next appointment   []Compliance: no shows; cancellations; lapse in therapy   []Verification: order discrepancy; may need modification...   [x]90-day supply request   [x]Advanced refill request: > 7 days before refill date   []Controlled medication   []Medication not included in policy   []Review: new med; med adjusted ? 30 days; safety alert; requires lab monitoring...   []Scope of Practice: refill request processed by LPN/MA   []Other:      Medication(s) requested:     -  traZODone (DESYREL) 50 MG tablet   Date last ordered: 4/10/24  Qty: 90  Refills: 0      -  venlafaxine (EFFEXOR XR) 75 MG 24 hr capsule   Date last ordered: 5/24/24  Qty: 30  Refills: 1      -  ALPRAZolam (XANAX) 0.5 MG tablet   Date last ordered: 6/9/24  Qty: 10  Refills: 2  Patient should have refills on file    Any Controlled Substance(s)? Yes  [place]   Alprazolam was last sold on 6/17/24 for quantity of 10.  Other controlled substance on [place] ?: Yes    Requested medication(s) verified as identical to current order? Yes    Any lapse in adherence to medication(s) greater than 5 days? No      Additional action taken? no.    Last visit treatment plan:       PLAN:      Patient advised of consultative model. Patient will continue to be seen for ongoing consultation and stabilization.  Does not meet criteria for involuntary treatment or hospitalization  Effexor  mg daily partial benefits => 5/24/24 112.5 mg x 1 month and 75 mg daily-Risks, benefits and alternatives  discussed.  Patient provides verbal consent to treatment.  Continue alprazolam 0.5 mg daily as needed anxiety-Risks, benefits and alternatives discussed.  Patient provides verbal consent to treatment.  Controlled substance rules sent by medical message and key rules discussed. Up to quantity 10/month  Trazodone 50 mg p.o. nightly as needed insomnia-provides well consent to treatment  Options-buspirone only with therapy, duloxetine, Viibryd  DC'd gabapentin (no need, lacking benefits at low dose)  Labs -no further labs indicated at this time  Referred for acupuncture for mental health  Undergoing psychotherapy with CHASE Kimbrough  Return in 6 to 8 weeks    Any medication(s) require lab monitoring? No

## 2024-07-22 NOTE — PROGRESS NOTES
[facility]  July 24, 2024      Behavioral Health Clinician Progress Note    Patient Name: Rebecca Fischer           Service Type:  Individual      Service Location:   MyChart / Email (patient reached)     Session Start Time: 1004am  Session End Time: 1023am      Session Length: 16 - 37      Attendees: Patient     Service Modality:  Video Visit:      Provider verified identity through the following two step process.  Patient provided:  Patient is known previously to provider and Patient was verified at admission/transfer    Telemedicine Visit: The patient's condition can be safely assessed and treated via synchronous audio and visual telemedicine encounter.      Reason for Telemedicine Visit: Services only offered telehealth    Originating Site (Patient Location): Patient's home    Distant Site (Provider Location): [facility] M[facility] [place] [facility]    Consent:  The patient/guardian has verbally consented to: the potential risks and benefits of telemedicine (video visit) versus in person care; bill my insurance or make self-payment for services provided; and responsibility for payment of non-covered services.     Patient would like the video invitation sent by:  My Chart    Mode of Communication:  Video Conference via [facility]    Distant Location (Provider):  On-site    As the provider I attest to compliance with applicable laws and regulations related to telemedicine.    Visit Activities (Refresh list every visit): [facility] Only    Diagnostic Assessment Date: 5/3/2023 Javed Kimbrough  Treatment Plan Review Date: 8/22/24  See Flowsheets for today's PHQ-9 and BRETT-7 results  Previous PHQ-9:       5/1/2024    11:03 AM 5/24/2024     8:28 AM 7/23/2024    11:54 AM   PHQ-9 SCORE   PHQ-9 Total Score Hai 11 (Moderate depression) 7 (Mild depression) 18 (Moderately severe depression)   PHQ-9 Total Score 11 7 18   See PHQ-2.     Previous BRETT-7:        5/1/2024    11:04 AM 5/24/2024     8:29 AM 7/23/2024    11:55 AM   BRETT-7 SCORE   Total Score 19 (severe anxiety) 17 (severe anxiety) 21 (severe anxiety)   Total Score 19 17    17 21    21       IMANI LEVEL:      9/29/2022     7:48 AM   IMANI Score (Last Two)   IMANI Raw Score 36   Activation Score 75.5   IMANI Level 4       DATA  Extended Session (60+ minutes): No  Interactive Complexity: No  Crisis: No  [facility] Patient: No    Treatment Objective(s) Addressed in This Session:  Target Behavior(s):  continue to mange anxiety     Depressed Mood: Increase interest, engagement, and pleasure in doing things  Decrease frequency and intensity of feeling down, depressed, hopeless  Improve quantity and quality of night time sleep / decrease daytime naps  Feel less tired and more energy during the day   Anxiety: will experience a reduction in anxiety, will develop more effective coping skills to manage anxiety symptoms, and will increase ability to function adaptively  Psychological distress related to Sleep Disturbance    Current Stressors / Issues:  Questions/Thoughts for Dr. Bose: none    Better/worse: anxiety still a main concern, depression is still low, a few panic attacks   ADLs: sleep- have trouble falling asleep and if wake up has trouble falling back asleep, will read before bed, hygiene- no concerns, appetite- tend to eat too many sweets and no time to exercise right now     Current Symptoms: Pt reports that things are about the same as last time. She is ruminating. Over thinking is managed more than before. Anxiety on a 1-10 scale on a good day it's a 4-5 and on certain days can get up to a 9. She has been busy and is using distraction more than skills due to this. Work and schedule and current events and kids have kept them busy. With work and school it is difficult to do different things. Depressive sx vary day by day and it is manageable. Pt reports that they haven't been able to socialize much. She is able to focus  with work and school. Pt states that she has a few panic attacks and is able to manage them better. She is able to curve them. Medication has been helpful. Has been able to get outside a little bit.     Mood: average, a little irritability occasionally     Substance Use: no changes  Therapist: haven't due to lack of time and is a financial burden   [facility] recommendations: mindfulness, SAD lamp     Progress towards goals: trying to get outside as able, not doing meditations as much as she would like       Progress on Treatment Objective(s) / Homework:  Satisfactory progress - ACTION (Actively working towards change); Intervened by reinforcing change plan / affirming steps taken      Pt reports that she has tried to get outside as she is able and she hasn't been doing meditations as much as she would like due to work and school and scheduling.     [facility] provides pt with support and resources for mindfulness, apps and recommends to use the SAD lamp to see if this helps boost mood on gray days and days she can't get outside.     Motivational Interviewing    MI Intervention: Co-Developed Goal: improve anxiety and depression, Expressed Empathy/Understanding, Supported Autonomy, Collaboration, Evocation, Permission to raise concern or advise, Open-ended questions, Reflections: simple and complex, Change talk (evoked), and Reframe     Change Talk Expressed by the Patient: Committment to change Activation Taking steps    Provider Response to Change Talk: E - Evoked more info from patient about behavior change, A - Affirmed patient's thoughts, decisions, or attempts at behavior change, R - Reflected patient's change talk, and S - Summarized patient's change talk statements    Also provided psychoeducation about behavioral health condition, symptoms, and treatment options. [facility] provides psychoeducation about anxiety.     Assessments completed prior to visit:  The following assessments were completed by patient for this visit:    PHQ2:       11/27/2023     8:58 AM 7/25/2023    10:07 AM 2/6/2023    11:17 AM 1/9/2023     5:58 PM 12/5/2022     3:20 PM 5/31/2022     9:18 AM 5/1/2022     6:01 PM   PHQ-2 ( 1999 [employer])   Q1: Little interest or pleasure in doing things 1 1     1   Q2: Feeling down, depressed or hopeless 1 1     1   PHQ-2 Score 2 2     2   Q1: Little interest or pleasure in doing things  Several days     Several days   Q2: Feeling down, depressed or hopeless  Several days     Several days   PHQ-2 Score  2 Incomplete Incomplete Incomplete Incomplete 2     GAD7:       7/19/2023    12:19 PM 10/10/2023     9:59 AM 11/24/2023    11:02 AM 3/15/2024    10:00 AM 5/1/2024    11:04 AM 5/24/2024     8:29 AM 7/23/2024    11:55 AM   BRETT-7 SCORE   Total Score 18 (severe anxiety) 21 (severe anxiety) 18 (severe anxiety) 15 (severe anxiety) 19 (severe anxiety) 17 (severe anxiety) 21 (severe anxiety)   Total Score 18 21 18    18 15    15 19 17    17 21    21     GAD2:       7/25/2023    10:07 AM 10/10/2023     9:59 AM 11/24/2023    11:02 AM 3/15/2024    10:00 AM 5/1/2024    11:04 AM 5/24/2024     8:29 AM 7/23/2024    11:55 AM   BRETT-2   Feeling nervous, anxious, or on edge 3 3 3 3 3 2 3   Not being able to stop or control worrying 3 3 3 3 3 2 3   BRETT-2 Total Score 6 6 6    6 6    6 6 4    4 6    6       Care Plan review completed: Yes    Medication Review:  No changes to current psychiatric medication(s)    Medication Compliance:  Yes, missed a couple of time, rarely though    Changes in Health Issues:   None reported    Chemical Use Review:   Substance Use: Chemical use reviewed, no active concerns identified      Tobacco Use: No current tobacco use.      Assessment: Current Emotional / Mental Status (status of significant symptoms):  Risk status (Self / Other harm or suicidal ideation)  Patient has had a history of suicidal ideation: In high school and suicide attempts: in high school according to Connie Cormier note 5/10/2023  Patient denies  current fears or concerns for personal safety.  Patient denies current or recent suicidal ideation or behaviors.  Patient denies current or recent homicidal ideation or behaviors.  Patient denies current or recent self injurious behavior or ideation.  Patient denies other safety concerns.  A safety and risk management plan has not been developed at this time, however patient was encouraged to call [place] [phone] should there be a change in any of these risk factors.    Appearance:   Appropriate    Eye Contact:   Good   Psychomotor Behavior: Normal   Attitude:   Cooperative  Interested Pleasant  Orientation:   All  Speech   Rate / Production: Normal    Volume:  Normal   Mood:    Anxious , sad  Affect:    Appropriate   Thought Content:  Clear   Thought Form:  Coherent  Logical   Insight:    Good     Diagnoses:  1. Moderate episode of recurrent major depressive disorder (H)    2. Generalized anxiety disorder        Collateral Reports Completed:  Communicated with: Martin Bose M.D.     Plan: (Homework, other):  Patient was given information about behavioral services and encouraged to schedule a follow up appointment with the clinic [facility] in 1 month.  She was also given information about mental health symptoms and treatment options .  CD Recommendations: No indications of CD issues.   Milla Hendrix, [facility]    _____________________________________________                                              Individual Treatment Plan    Patient's Name: Rebecca Fischer   YOB: 1985  Date of Creation: 5/24/2024  Date Treatment Plan Last Reviewed/Revised: 5/24/2024    DSM5 Diagnoses:   1. Moderate episode of recurrent major depressive disorder (H)    2. Generalized anxiety disorder      Psychosocial / Contextual Factors: has children, is , works, has supports with family   PROMIS (reviewed every 90 days):   PROMIS 10-Global Health (only subscores and total score):       3/31/2023    11:27 AM 4/3/2023      9:12 AM 5/4/2023     9:17 AM 10/10/2023    10:00 AM 3/15/2024    10:01 AM 7/23/2024    11:56 AM   PROMIS-10 Scores Only   Global Mental Health Score 8 8 8    8 7 11    11 10    10   Global Physical Health Score 12 13 13    13 13 15    15 13    13   PROMIS TOTAL - SUBSCORES 20 21 21    21 20 26    26 23    23        Referral / Collaboration:  Referral to another professional/service is not indicated at this time.    Anticipated number of session for this episode of care: 3-6 sessions  Anticipation frequency of session: Monthly  Anticipated Duration of each session: 16-37 minutes  Treatment plan will be reviewed in 90 days or when goals have been changed.       MeasurableTreatment Goal(s) related to diagnosis / functional impairment(s)  Goal 1: Patient will manage anxiety and depressive sx    I will know I've met my goal when I am taking more time outside and self care and plan more self care into my busy life and working on meditation skill.      Objective #A (Patient Action)    Patient will spend more time outside and practice self care and working on meditation skills and report progress each visit.   Status: New - Date: 5/24/2024      Intervention(s)  [facility] will provide support through CBT, MI, Acceptance and Commitment Therapy, Dialectic Behavioral Therapy and problem solving model to explore and overcome barriers.      Patient has reviewed and agreed to the above plan.    Written by  Milla Hendrix [facility], [facility]

## 2024-07-23 ASSESSMENT — ANXIETY QUESTIONNAIRES
5. BEING SO RESTLESS THAT IT IS HARD TO SIT STILL: NEARLY EVERY DAY
GAD7 TOTAL SCORE: 21
3. WORRYING TOO MUCH ABOUT DIFFERENT THINGS: NEARLY EVERY DAY
GAD7 TOTAL SCORE: 21
7. FEELING AFRAID AS IF SOMETHING AWFUL MIGHT HAPPEN: NEARLY EVERY DAY
7. FEELING AFRAID AS IF SOMETHING AWFUL MIGHT HAPPEN: NEARLY EVERY DAY
8. IF YOU CHECKED OFF ANY PROBLEMS, HOW DIFFICULT HAVE THESE MADE IT FOR YOU TO DO YOUR WORK, TAKE CARE OF THINGS AT HOME, OR GET ALONG WITH OTHER PEOPLE?: EXTREMELY DIFFICULT
IF YOU CHECKED OFF ANY PROBLEMS ON THIS QUESTIONNAIRE, HOW DIFFICULT HAVE THESE PROBLEMS MADE IT FOR YOU TO DO YOUR WORK, TAKE CARE OF THINGS AT HOME, OR GET ALONG WITH OTHER PEOPLE: EXTREMELY DIFFICULT
6. BECOMING EASILY ANNOYED OR IRRITABLE: NEARLY EVERY DAY
7. FEELING AFRAID AS IF SOMETHING AWFUL MIGHT HAPPEN: NEARLY EVERY DAY
4. TROUBLE RELAXING: NEARLY EVERY DAY
6. BECOMING EASILY ANNOYED OR IRRITABLE: NEARLY EVERY DAY
GAD7 TOTAL SCORE: 21
2. NOT BEING ABLE TO STOP OR CONTROL WORRYING: NEARLY EVERY DAY
GAD7 TOTAL SCORE: 21
5. BEING SO RESTLESS THAT IT IS HARD TO SIT STILL: NEARLY EVERY DAY
IF YOU CHECKED OFF ANY PROBLEMS ON THIS QUESTIONNAIRE, HOW DIFFICULT HAVE THESE PROBLEMS MADE IT FOR YOU TO DO YOUR WORK, TAKE CARE OF THINGS AT HOME, OR GET ALONG WITH OTHER PEOPLE: EXTREMELY DIFFICULT
7. FEELING AFRAID AS IF SOMETHING AWFUL MIGHT HAPPEN: NEARLY EVERY DAY
2. NOT BEING ABLE TO STOP OR CONTROL WORRYING: NEARLY EVERY DAY
GAD7 TOTAL SCORE: 21
1. FEELING NERVOUS, ANXIOUS, OR ON EDGE: NEARLY EVERY DAY
8. IF YOU CHECKED OFF ANY PROBLEMS, HOW DIFFICULT HAVE THESE MADE IT FOR YOU TO DO YOUR WORK, TAKE CARE OF THINGS AT HOME, OR GET ALONG WITH OTHER PEOPLE?: EXTREMELY DIFFICULT
GAD7 TOTAL SCORE: 21
3. WORRYING TOO MUCH ABOUT DIFFERENT THINGS: NEARLY EVERY DAY
1. FEELING NERVOUS, ANXIOUS, OR ON EDGE: NEARLY EVERY DAY
4. TROUBLE RELAXING: NEARLY EVERY DAY

## 2024-07-23 ASSESSMENT — PATIENT HEALTH QUESTIONNAIRE - PHQ9
SUM OF ALL RESPONSES TO PHQ QUESTIONS 1-9: 18
SUM OF ALL RESPONSES TO PHQ QUESTIONS 1-9: 18
10. IF YOU CHECKED OFF ANY PROBLEMS, HOW DIFFICULT HAVE THESE PROBLEMS MADE IT FOR YOU TO DO YOUR WORK, TAKE CARE OF THINGS AT HOME, OR GET ALONG WITH OTHER PEOPLE: VERY DIFFICULT

## 2024-07-24 ENCOUNTER — VIRTUAL VISIT (OUTPATIENT)
Dept: PSYCHIATRY | Facility: CLINIC | Age: 39
End: 2024-07-24
Payer: COMMERCIAL

## 2024-07-24 ENCOUNTER — VIRTUAL VISIT (OUTPATIENT)
Dept: BEHAVIORAL HEALTH | Facility: CLINIC | Age: 39
End: 2024-07-24
Payer: COMMERCIAL

## 2024-07-24 DIAGNOSIS — F33.1 MODERATE EPISODE OF RECURRENT MAJOR DEPRESSIVE DISORDER (H): ICD-10-CM

## 2024-07-24 DIAGNOSIS — F33.1 MODERATE EPISODE OF RECURRENT MAJOR DEPRESSIVE DISORDER (H): Primary | ICD-10-CM

## 2024-07-24 DIAGNOSIS — F41.1 GENERALIZED ANXIETY DISORDER: ICD-10-CM

## 2024-07-24 DIAGNOSIS — G47.00 INSOMNIA, UNSPECIFIED TYPE: ICD-10-CM

## 2024-07-24 PROCEDURE — 90832 PSYTX W PT 30 MINUTES: CPT | Mod: 95 | Performed by: COUNSELOR

## 2024-07-24 PROCEDURE — 99214 OFFICE O/P EST MOD 30 MIN: CPT | Mod: 95 | Performed by: PSYCHIATRY & NEUROLOGY

## 2024-07-24 RX ORDER — VENLAFAXINE HYDROCHLORIDE 75 MG/1
75 CAPSULE, EXTENDED RELEASE ORAL DAILY
Qty: 30 CAPSULE | Refills: 3 | Status: SHIPPED | OUTPATIENT
Start: 2024-07-24

## 2024-07-24 RX ORDER — ALPRAZOLAM 0.5 MG
0.5 TABLET ORAL DAILY PRN
Qty: 10 TABLET | Refills: 3 | Status: SHIPPED | OUTPATIENT
Start: 2024-07-24

## 2024-07-24 RX ORDER — TRAZODONE HYDROCHLORIDE 50 MG/1
50 TABLET, FILM COATED ORAL
Qty: 90 TABLET | Refills: 0 | Status: SHIPPED | OUTPATIENT
Start: 2024-07-24

## 2024-07-24 NOTE — NURSING NOTE
Current patient location: [address] [facility] [address]    Is the patient currently in the state of [place]? YES    Visit mode:VIDEO    If the visit is dropped, the patient can be reconnected by: VIDEO VISIT: Text to cell phone:   Telephone Information:   Mobile [phone]       Will anyone else be joining the visit? NO  (If patient encounters technical issues they should call [phone]    How would you like to obtain your AVS? MyChart    Are changes needed to the allergy or medication list? Pt stated no changes to allergies and Pt stated no med changes    Are refills needed on medications prescribed by this physician? YES    Reason for visit: CYDNEY ROJAS

## 2024-07-24 NOTE — PROGRESS NOTES
Virtual Visit Details    Type of service:  Video Visit     Originating Location (pt. Location): Home    Distant Location (provider location):  On-site  Platform used for Video Visit: [facility] Psychiatry Consult Note    IDENTIFICATION   Name: Rebecca Fischer   : 1985/38 year old      Sex:    @ female          Telemedicine Visit: The patient's condition can be safely assessed and treated via synchronous audio and visual telemedicine encounter.        Face to Face/patient Contact total time: 10 minutes  Pre Charting time: 2 minutes; Post charting time, communication and other activities: 1 minutes; Total time 13 minutes  10:39 AM - 10:49AM          SUBJECTIVE     History of Present Illness    Rebecca Fischer, a 38-year-old female, presented with symptoms related to her diagnosed conditions of insomnia, generalized anxiety disorder, and a moderate episode of recurrent major depressive disorder. She reported feeling overwhelmed due to life circumstances, which she believes are contributing to her anxiety. She described her current state as being overscheduled and overworked, leading to feelings of exhaustion.    Rebecca reported experiencing panic attacks, although she noted that they are not as severe as they used to be. She can usually recognize when a panic attack is imminent and uses Xanax to manage these episodes. The panic attacks typically last around 10 to 15 minutes and occur sporadically, with three attacks reported since her last consultation. She identified being overwhelmed and unable to take time for herself as triggers for these panic attacks. Despite these challenges, Rebecca reported that she is able to manage her life, including taking care of her children, her household, her schoolwork, and her work schedule.    Regarding her anxiety, Rebecca reported that it has not worsened and may be more manageable in some aspects, such as less overthinking. She expressed a desire to  maintain her current medication regimen, as she noticed more difficulty managing her symptoms when she previously decreased her dosage. She rated her average daily anxiety level as between 4 and 6 on a scale of 1 to 10, with 10 being extreme.    Rebecca reported that she has been too busy to engage in self-care activities such as meditation, although she does find time to read and exercise. She mentioned that she was referred for acupuncture and expressed interest in pursuing this treatment. She also reported taking trazodone two to four times a week, but not nightly, as she feels very tired when she takes it.    Rebecca expressed satisfaction with her current state and plans to continue with acupuncture and try to incorporate more self-care into her routine. She also mentioned that she would consider reducing her medication dosage in the future, depending on how she feels over the next few months.             The following assessments were completed by patient for this visit:  PROMIS 10-Global Health (only subscores and total score):       3/31/2023    11:27 AM 4/3/2023     9:12 AM 5/4/2023     9:17 AM 10/10/2023    10:00 AM 3/15/2024    10:01 AM 7/23/2024    11:56 AM   PROMIS-10 Scores Only   Global Mental Health Score 8 8 8    8 7 11    11 10    10   Global Physical Health Score 12 13 13    13 13 15    15 13    13   PROMIS TOTAL - SUBSCORES 20 21 21    21 20 26    26 23    23             5/1/2024    11:03 AM 5/24/2024     8:28 AM 7/23/2024    11:54 AM   PHQ   PHQ-9 Total Score 11 7 18   Q9: Thoughts of better off dead/self-harm past 2 weeks Not at all Not at all Not at all          5/1/2024    11:04 AM 5/24/2024     8:29 AM 7/23/2024    11:55 AM   BRETT-7 SCORE   Total Score 19 (severe anxiety) 17 (severe anxiety) 21 (severe anxiety)   Total Score 19 17    17 21    21        OBJECTIVE     Vital Signs:   There were no vitals taken for this visit.    Labs:    Results                    Current Medications:  Current  Outpatient Medications   Medication Sig Dispense Refill    ALPRAZolam (XANAX) 0.5 MG tablet Take 1 tablet (0.5 mg) by mouth daily as needed for anxiety 10 tablet 3    traZODone (DESYREL) 50 MG tablet Take 1 tablet (50 mg) by mouth nightly as needed for sleep 90 tablet 0    venlafaxine (EFFEXOR XR) 75 MG 24 hr capsule Take 1 capsule (75 mg) by mouth daily 30 capsule 3    ECHINACEA EXTRACT PO       levonorgestrel (MIRENA) 20 MCG/24HR IUD 1 each (20 mcg) by Intrauterine route once      Multiple Vitamins-Minerals (EMERGEN-C IMMUNE PO)  (Patient not taking: Reported on 12/13/2023)      phentermine 15 MG capsule Take 1 capsule (15 mg) by mouth every morning 30 capsule 0    Prenatal Vit-Fe Fumarate-FA (PRENATAL VITAMIN AND MINERAL PO)        No current facility-administered medications for this visit.            ADDED HISTORY   Patient is a busy mother of eight children, managing her household, school, and work. She tries to take care of herself through reading and exercise, but has been too busy to meditate recently. She is interested in trying acupuncture for her anxiety.    Physical exam    Physical Exam    - Anxiety, depression, affect:  - Speech and language:  - Insight and judgment:  - Alert and orientation to person, place and situation:  - SI: None  - HI: None  - AH: None  - VH: None             MENTAL STATUS EXAMINATION:   Appearance: Intact attention to grooming and hygiene  Attitude: Cooperative  Eye Contact: Good  Gait and Station: Sitting  Psychomotor Behavior: Within normal limits  Oriented to: Grossly person place or time  Attention Span and Concentration: Grossly intact  Speech: Within normal limits  Language: English  Mood:  mildly anxious  Affect: Mildly constricted  Associations:  no loose associations  Thought Process:  logical, linear and goal oriented  Thought Content: No evidence of delusions or suicidal or homicidal ideation plan or intent  Memory: Grossly intact  Fund of Knowledge: Good  Insight:   good  Judgment:  intact, adequate for safety  Impulse Control:  intact        DIAGNOSES:   Major depressive disorder, moderate, recurrent, in partial remission  Generalized anxiety disorder      ASSESSMENT:   Patient with improvements in depression and anxiety with Effexor titration.  Plan was to titrate, her preference is to attempt holistic treatment rather than medication and tapering initiated as per patient preference.  Was advised of potential for difficulty.  She is undergoing psychotherapy so is to receive the recommended treatment regardless.    Today Rebecca Fischer reports no suicidal ideations. In addition, she has notable risk factors for self-harm, including anxiety. However, risk is mitigated by commitment to family, Confucianist beliefs, and absence of past attempts. Therefore, based on all available evidence including the factors cited above, she does not appear to be at imminent risk for self-harm, does not meet criteria for a 72-hr hold, and therefore remains appropriate for ongoing outpatient level of care.       PLAN:     Patient advised of consultative model. Patient will continue to be seen for ongoing consultation and stabilization.  Does not meet criteria for involuntary treatment or hospitalization  Effexor  mg daily partial benefits => 5/24/24 112.5 mg x 1 month and 75 mg daily-Risks, benefits and alternatives discussed.  Patient provides verbal consent to treatment.  Continue alprazolam 0.5 mg daily as needed anxiety-Risks, benefits and alternatives discussed.  Patient provides verbal consent to treatment.  Controlled substance rules sent by medical message and key rules discussed. Up to quantity 10/month  Trazodone 50 mg p.o. nightly as needed insomnia taken 2-4x/week, efficacy, tired when waking up if not enough time for bed-provides well consent to treatment  Options-buspirone only with therapy, duloxetine, Viibryd  DC'd gabapentin (no need, lacking benefits at low dose)  Labs -no  further labs indicated at this time  Referred for acupuncture for mental health  Undergoing psychotherapy with CHASE Kimbrough    Assessment & Plan     Assessment  Patient's anxiety is severe according to the questionnaire, but she feels it is manageable and rates it between 4 and 6 on a daily basis. She noticed more difficulty managing her symptoms when she previously decreased her medication dosage, so she does not wish to decrease it at this time. She is managing her life, taking care of her children, household, school, and work. She is interested in trying acupuncture for her anxiety.    Plan  - Continue current medication regimen  - Try acupuncture for anxiety  - Check in in about two to three months    Prescription  Refills of Xanax and Trazodone    Appointments  Next appointment in two to three months           Administrative Billing:   Time spent with patient was greater than 50% of time and/or significant time was spent in counseling and coordination of care regarding above diagnoses and treatment plan. Pre charting time and post charting time/documentation/coordination are done on date of service.      Signed:   Martin Bose M.D.  [facility] Psychiatry Service    Disclaimer: This note consists of symbols derived from keyboarding, dictation and/or voice recognition software. As a result, there may be errors in the script that have gone undetected. Please consider this when interpreting information found in this chart.    Consent was obtained from the patient to use an AI documentation tool in the creation of this note.     eoc

## 2024-08-21 ENCOUNTER — TELEPHONE (OUTPATIENT)
Dept: PSYCHIATRY | Facility: CLINIC | Age: 39
End: 2024-08-21
Payer: COMMERCIAL

## 2024-08-21 NOTE — TELEPHONE ENCOUNTER
"Refill request r'cd from [facility] via fax for 90 supply of Trazodone 50 mg denied as pt has supply to reach appt on 10/8/24 .   This medication was last prescribed on  7/24/24 for Qty 90 with 0 refill(s).       Disp Refills Start End ESTELA   traZODone (DESYREL) 50 MG tablet 90 tablet 0 7/24/2024 -- No   Sig - Route: Take 1 tablet (50 mg) by mouth nightly as needed for sleep - Oral   Sent to pharmacy as: traZODone HCl 50 MG Oral Tablet (DESYREL)   Class: E-Prescribe     [facility] [id number] [facility] - [place], [place] - [address] [facility]     Faxed \"not authorized\" back to pharmacy with reason.                   "

## 2024-08-23 NOTE — TELEPHONE ENCOUNTER
Second RF request for a 90-day supply of Trazodone denied dt: pt has supply to reach appt on 10/8/24 .

## 2024-10-07 ASSESSMENT — ANXIETY QUESTIONNAIRES
1. FEELING NERVOUS, ANXIOUS, OR ON EDGE: NEARLY EVERY DAY
3. WORRYING TOO MUCH ABOUT DIFFERENT THINGS: NEARLY EVERY DAY
6. BECOMING EASILY ANNOYED OR IRRITABLE: NEARLY EVERY DAY
7. FEELING AFRAID AS IF SOMETHING AWFUL MIGHT HAPPEN: NEARLY EVERY DAY
2. NOT BEING ABLE TO STOP OR CONTROL WORRYING: NEARLY EVERY DAY
1. FEELING NERVOUS, ANXIOUS, OR ON EDGE: NEARLY EVERY DAY
3. WORRYING TOO MUCH ABOUT DIFFERENT THINGS: NEARLY EVERY DAY
GAD7 TOTAL SCORE: 21
4. TROUBLE RELAXING: NEARLY EVERY DAY
GAD7 TOTAL SCORE: 21
4. TROUBLE RELAXING: NEARLY EVERY DAY
GAD7 TOTAL SCORE: 21
7. FEELING AFRAID AS IF SOMETHING AWFUL MIGHT HAPPEN: NEARLY EVERY DAY
5. BEING SO RESTLESS THAT IT IS HARD TO SIT STILL: NEARLY EVERY DAY
2. NOT BEING ABLE TO STOP OR CONTROL WORRYING: NEARLY EVERY DAY
IF YOU CHECKED OFF ANY PROBLEMS ON THIS QUESTIONNAIRE, HOW DIFFICULT HAVE THESE PROBLEMS MADE IT FOR YOU TO DO YOUR WORK, TAKE CARE OF THINGS AT HOME, OR GET ALONG WITH OTHER PEOPLE: EXTREMELY DIFFICULT
GAD7 TOTAL SCORE: 21
6. BECOMING EASILY ANNOYED OR IRRITABLE: NEARLY EVERY DAY
7. FEELING AFRAID AS IF SOMETHING AWFUL MIGHT HAPPEN: NEARLY EVERY DAY
8. IF YOU CHECKED OFF ANY PROBLEMS, HOW DIFFICULT HAVE THESE MADE IT FOR YOU TO DO YOUR WORK, TAKE CARE OF THINGS AT HOME, OR GET ALONG WITH OTHER PEOPLE?: EXTREMELY DIFFICULT
GAD7 TOTAL SCORE: 21
8. IF YOU CHECKED OFF ANY PROBLEMS, HOW DIFFICULT HAVE THESE MADE IT FOR YOU TO DO YOUR WORK, TAKE CARE OF THINGS AT HOME, OR GET ALONG WITH OTHER PEOPLE?: EXTREMELY DIFFICULT
5. BEING SO RESTLESS THAT IT IS HARD TO SIT STILL: NEARLY EVERY DAY
IF YOU CHECKED OFF ANY PROBLEMS ON THIS QUESTIONNAIRE, HOW DIFFICULT HAVE THESE PROBLEMS MADE IT FOR YOU TO DO YOUR WORK, TAKE CARE OF THINGS AT HOME, OR GET ALONG WITH OTHER PEOPLE: EXTREMELY DIFFICULT

## 2024-10-08 ENCOUNTER — VIRTUAL VISIT (OUTPATIENT)
Dept: BEHAVIORAL HEALTH | Facility: CLINIC | Age: 39
End: 2024-10-08
Payer: COMMERCIAL

## 2024-10-08 ENCOUNTER — VIRTUAL VISIT (OUTPATIENT)
Dept: PSYCHIATRY | Facility: CLINIC | Age: 39
End: 2024-10-08
Payer: COMMERCIAL

## 2024-10-08 DIAGNOSIS — F33.1 MODERATE EPISODE OF RECURRENT MAJOR DEPRESSIVE DISORDER (H): ICD-10-CM

## 2024-10-08 DIAGNOSIS — F41.1 GENERALIZED ANXIETY DISORDER: ICD-10-CM

## 2024-10-08 DIAGNOSIS — F41.1 GENERALIZED ANXIETY DISORDER: Primary | ICD-10-CM

## 2024-10-08 PROCEDURE — G2211 COMPLEX E/M VISIT ADD ON: HCPCS | Mod: 95 | Performed by: PSYCHIATRY & NEUROLOGY

## 2024-10-08 PROCEDURE — 90832 PSYTX W PT 30 MINUTES: CPT | Mod: 95 | Performed by: COUNSELOR

## 2024-10-08 PROCEDURE — 99214 OFFICE O/P EST MOD 30 MIN: CPT | Mod: 95 | Performed by: PSYCHIATRY & NEUROLOGY

## 2024-10-08 RX ORDER — VENLAFAXINE HYDROCHLORIDE 37.5 MG/1
37.5 CAPSULE, EXTENDED RELEASE ORAL DAILY
Qty: 30 CAPSULE | Refills: 2 | Status: SHIPPED | OUTPATIENT
Start: 2024-10-08

## 2024-10-08 ASSESSMENT — PAIN SCALES - GENERAL: PAINLEVEL: MODERATE PAIN (5)

## 2024-10-08 NOTE — NURSING NOTE
Current patient location: [address] [place] [id number]    Is the patient currently in the state of [place]? YES    Visit mode:VIDEO    If the visit is dropped, the patient can be reconnected by: VIDEO VISIT: Text to cell phone:   Telephone Information:   Mobile [phone]    and VIDEO VISIT: Send to e-mail at: [email or web address]    Will anyone else be joining the visit? NO  (If patient encounters technical issues they should call [phone]    Are changes needed to the allergy or medication list? No, but pt would like to discuss making changes.    Are refills needed on medications prescribed by this physician? Discuss with provider    Rooming Documentation:  Questionnaire(s) completed    Reason for visit: RECHECK    Carissa ROJAS

## 2024-10-08 NOTE — PROGRESS NOTES
[facility]  10/8/2024      Behavioral Health Clinician Progress Note    Patient Name: Rebecca Fischer           Service Type:  Individual      Service Location:   [facility] / Email (patient reached)     Session Start Time: 929am  Session End Time: 1001am      Session Length: 16 - 37      Attendees: Patient     Service Modality:  Video Visit:      Provider verified identity through the following two step process.  Patient provided:  Patient is known previously to provider and Patient was verified at admission/transfer    Telemedicine Visit: The patient's condition can be safely assessed and treated via synchronous audio and visual telemedicine encounter.      Reason for Telemedicine Visit: Services only offered telehealth    Originating Site (Patient Location): Patient's home    Distant Site (Provider Location): Provider Remote Setting- Home Office    Consent:  The patient/guardian has verbally consented to: the potential risks and benefits of telemedicine (video visit) versus in person care; bill my insurance or make self-payment for services provided; and responsibility for payment of non-covered services.     Patient would like the video invitation sent by:  My Chart    Mode of Communication:  Video Conference via [facility]    Distant Location (Provider):  Off-site    As the provider I attest to compliance with applicable laws and regulations related to telemedicine.    Visit Activities (Refresh list every visit): [facility] Only    Diagnostic Assessment Date: 5/3/2023 Javed Kimbrough  Treatment Plan Review Date: 1/6/25  See Flowsheets for today's PHQ-9 and BRETT-7 results  Previous PHQ-9:       5/24/2024     8:28 AM 7/23/2024    11:54 AM 10/7/2024     9:43 AM   PHQ-9 SCORE   PHQ-9 Total Score MyChart 7 (Mild depression) 18 (Moderately severe depression) 7 (Mild depression)   PHQ-9 Total Score 7 18 7   See PHQ-2.     Previous BRETT-7:       5/24/2024     8:29 AM  7/23/2024    11:55 AM 10/7/2024     9:43 AM   BRETT-7 SCORE   Total Score 17 (severe anxiety) 21 (severe anxiety) 21 (severe anxiety)   Total Score 17    17 21    21 21    21       IMANI LEVEL:      9/29/2022     7:48 AM   IMANI Score (Last Two)   IMANI Raw Score 36   Activation Score 75.5   IMANI Level 4       DATA  Extended Session (60+ minutes): No  Interactive Complexity: No  Crisis: No  [facility] Patient: No    Treatment Objective(s) Addressed in This Session:  Target Behavior(s):  continue to mange anxiety     Depressed Mood: Increase interest, engagement, and pleasure in doing things  Decrease frequency and intensity of feeling down, depressed, hopeless  Improve quantity and quality of night time sleep / decrease daytime naps  Feel less tired and more energy during the day   Anxiety: will experience a reduction in anxiety, will develop more effective coping skills to manage anxiety symptoms, and will increase ability to function adaptively  Psychological distress related to Sleep Disturbance    Current Stressors / Issues:  Questions/Thoughts for Dr. Bose: decrease Effexor    Better/worse: higher anxiety, panic attacks, stress, sleep  ADLs: sleep- hasn't been good, she is refilling trazodone and isn't sure if it helps much, she will wake up and feel out of it, without the trazodone once she wakes up since is up, it will take a while to fall back to sleep and is up 1-2 times a night, appetite- no concerns, at times over eat than under eat, hygiene- no concerns   Before bed- pt will take a shower, do breathing, will make a plan to address things for tomorrow    Current Symptoms: Pt reports that depressive sx have improved. Anxiety is worse even with skills. It's hard to do self-care and take a break. Pt says they are ruminating and panic attacks are happening more often. It will go from 0-10 and they aren't able to bring it down. Breathing and increased heart rate. Pt has had to pull off to the side of the road and it's hard to  breath. Their chest is tight. Panic attacks can last from 5 minutes to 3 minutes. After 30 minutes it takes pt a bit to bounce back. When at home can take the alprazolam which helps. She hasn't tried ice packs. If she is beyond that point, it's hard to think of things. Pt has a calendar and uses a physical planner as well.     Stressors: finances, exs, kids, custody, work, schedules, weight, new job     Side Effects: pt denies  Mood: been fine, depression is okay, not feeling unmotivated or hopeless    Substance Use: no changes  Therapist: would love to see someone and it's hard to get medications and it costs $120 at a time for medication, cheaper therapy options- [facility] will send options through [employer], adult type fidgets- picking at nails and hair, fidget ring, Koosh ball, slime or substance that goes on rocks to pick, [employer] and [employer] have sections for fidgets, [employer] has some in a isle  [facility] recommendations: [facility] recommends pt to create a calming and coping skills bags, use insight or simple habit vernon, books to help related to ACT therapy, DBT tools website for other skills to cope, yoga, PMR, [facility] will provide skills and information through [employer].     Progress towards goals: continue goal to practice meditations and get outside more    Progress on Treatment Objective(s) / Homework:  Satisfactory progress - ACTION (Actively working towards change); Intervened by reinforcing change plan / affirming steps taken      CBT: Pt has tried to do deep breathing. She is having a hard time with time and schedules to do meditations.   [facility] provides pt with many apps and skills she can use briefly when she has time. [facility] recommends pt to create a calming and coping skills bags, use insight or simple habit vernon, books to help related to ACT therapy, DBT tools website for other skills to cope, yoga, PMR, [facility] will provide skills and information through [employer].     Motivational Interviewing    MI Intervention: Co-Developed  Goal: improve anxiety and depression, Expressed Empathy/Understanding, Supported Autonomy, Collaboration, Evocation, Permission to raise concern or advise, Open-ended questions, Reflections: simple and complex, Change talk (evoked), and Reframe     Change Talk Expressed by the Patient: Committment to change Activation Taking steps    Provider Response to Change Talk: E - Evoked more info from patient about behavior change, A - Affirmed patient's thoughts, decisions, or attempts at behavior change, R - Reflected patient's change talk, and S - Summarized patient's change talk statements    Also provided psychoeducation about behavioral health condition, symptoms, and treatment options.     Assessments completed prior to visit:  The following assessments were completed by patient for this visit:   PHQ2:       11/27/2023     8:58 AM 7/25/2023    10:07 AM 2/6/2023    11:17 AM 1/9/2023     5:58 PM 12/5/2022     3:20 PM 5/31/2022     9:18 AM 5/1/2022     6:01 PM   PHQ-2 ( 1999 [employer])   Q1: Little interest or pleasure in doing things 1 1     1   Q2: Feeling down, depressed or hopeless 1 1     1   PHQ-2 Score 2 2     2   Q1: Little interest or pleasure in doing things  Several days     Several days   Q2: Feeling down, depressed or hopeless  Several days     Several days   PHQ-2 Score  2 Incomplete Incomplete Incomplete Incomplete 2     GAD7:       10/10/2023     9:59 AM 11/24/2023    11:02 AM 3/15/2024    10:00 AM 5/1/2024    11:04 AM 5/24/2024     8:29 AM 7/23/2024    11:55 AM 10/7/2024     9:43 AM   BRETT-7 SCORE   Total Score 21 (severe anxiety) 18 (severe anxiety) 15 (severe anxiety) 19 (severe anxiety) 17 (severe anxiety) 21 (severe anxiety) 21 (severe anxiety)   Total Score 21 18    18 15    15 19 17    17 21    21 21    21     GAD2:       10/10/2023     9:59 AM 11/24/2023    11:02 AM 3/15/2024    10:00 AM 5/1/2024    11:04 AM 5/24/2024     8:29 AM 7/23/2024    11:55 AM 10/7/2024     9:43 AM   BRETT-2   Feeling nervous,  anxious, or on edge 3 3 3 3 2 3 3   Not being able to stop or control worrying 3 3 3 3 2 3 3   BRETT-2 Total Score 6 6    6 6    6 6 4    4 6    6 6    6       Care Plan review completed: Yes    Medication Review:  No changes to current psychiatric medication(s)    Medication Compliance:  Yes    Changes in Health Issues:   None reported    Chemical Use Review:   Substance Use: Chemical use reviewed, no active concerns identified      Tobacco Use: No current tobacco use.      Assessment: Current Emotional / Mental Status (status of significant symptoms):  Risk status (Self / Other harm or suicidal ideation)  Patient has had a history of suicidal ideation: In high school and suicide attempts: in high school according to Connie Cormier note 5/10/2023  Patient denies current fears or concerns for personal safety.  Patient denies current or recent suicidal ideation or behaviors.  Patient denies current or recent homicidal ideation or behaviors.  Patient denies current or recent self injurious behavior or ideation.  Patient denies other safety concerns.  A safety and risk management plan has not been developed at this time, however patient was encouraged to call [place] [phone] should there be a change in any of these risk factors.    Appearance:   Appropriate    Eye Contact:   Good   Psychomotor Behavior: Normal   Attitude:   Cooperative  Interested Friendly  Orientation:   All  Speech   Rate / Production: Normal    Volume:  Normal   Mood:    Anxious  Panicked, less depressed  Affect:    Subdued  Worrisome   Thought Content:  Clear   Thought Form:  Coherent  Logical   Insight:    Good     Diagnoses:  1. Generalized anxiety disorder    2. Moderate episode of recurrent major depressive disorder (H)        Collateral Reports Completed:  Communicated with: Martin Bose M.D.     Plan: (Homework, other):  Patient was given information about behavioral services and encouraged to schedule a follow up appointment with the clinic  "C in 1 month.  She was also given information about mental health symptoms and treatment options .  CD Recommendations: No indications of CD issues.   Milla Hendrix, [facility]    _____________________________________________                                              Individual Treatment Plan    Patient's Name: Rebecca Fischer   YOB: 1985  Date of Creation: 5/24/2024  Date Treatment Plan Last Reviewed/Revised: 10/8/2024    DSM5 Diagnoses:   1. Moderate episode of recurrent major depressive disorder (H)    2. Generalized anxiety disorder      Psychosocial / Contextual Factors: has children, is , works, has supports with family   PROMIS (reviewed every 90 days):   PROMIS 10-Global Health (only subscores and total score):       3/31/2023    11:27 AM 4/3/2023     9:12 AM 5/4/2023     9:17 AM 10/10/2023    10:00 AM 3/15/2024    10:01 AM 7/23/2024    11:56 AM   PROMIS-10 Scores Only   Global Mental Health Score 8 8 8    8 7 11    11 10    10   Global Physical Health Score 12 13 13    13 13 15    15 13    13   PROMIS TOTAL - SUBSCORES 20 21 21    21 20 26    26 23    23        Referral / Collaboration:  Referral to another professional/service is not indicated at this time.    Anticipated number of session for this episode of care: 3-6 sessions  Anticipation frequency of session: Monthly  Anticipated Duration of each session: 16-37 minutes  Treatment plan will be reviewed in 90 days or when goals have been changed.       MeasurableTreatment Goal(s) related to diagnosis / functional impairment(s)  Goal 1: Patient will manage anxiety and depressive sx    I will know I've met my goal when it is always going to be relevant and more self care, outdoor time, exercise.\"    Objective #A (Patient Action)    Patient will spend more time outside and practice self care and working on meditation skills and report progress each visit.   Status: Continued- 10/8/2024    Intervention(s)  [facility] will provide support " through CBT.      Patient has reviewed and agreed to the above plan.    Written by  Milla Hendrix, YESSY, [facility]

## 2024-10-08 NOTE — PROGRESS NOTES
Virtual Visit Details    Type of service:  Video Visit     Originating Location (pt. Location): Home    Distant Location (provider location):  Off-site  Platform used for Video Visit: [facility] Psychiatry Consult Note    IDENTIFICATION   Name: Rebecca Fischer   : 1985/38 year old      Sex:    @ female          Telemedicine Visit: The patient's condition can be safely assessed and treated via synchronous audio and visual telemedicine encounter.        Face to Face/patient Contact total time: 13 minutes  Pre Charting time: 3 minutes; Post charting time, communication and other activities: 2 minutes; Total time 18 minutes  10:09 AM-10:22 AM        SUBJECTIVE     History of Present Illness    Rebecca Fischer, a 38-year-old female, has been experiencing an increase in the frequency and severity of her anxiety symptoms, which have been particularly challenging recently. She reports that her anxiety has become a daily occurrence, which she attributes to a multitude of stressors in her life. These include changes in work and school schedules, responsibilities related to her children, and a new job. She also mentions that the  season tends to be a busy time for her, which may be contributing to her heightened anxiety.    Rebecca has been experiencing panic attacks more consistently than before. These episodes have become so severe that they have started to impact her daily activities. For instance, she has had to cancel plans due to fear of having a panic attack while driving, indicating a significant functional impairment due to her anxiety.    In terms of her sleep, Rebecca reports that she gets between five to eight hours of sleep per night. However, her sleep schedule fluctuates due to her work commitments, which sometimes require her to wake up as early as 4:00 AM or work night shifts. This irregular sleep pattern could potentially be exacerbating her anxiety symptoms.    Rebecca has been  taking Trazodone, but she reports that it seems to cause a hangover effect, making her feel more tired when she wakes up. She has decided to discontinue its use as she feels it does not help enough to justify the side effects. She has also been on Effexor, which was reduced in May. She expresses a desire to further decrease the dosage of Effexor, despite acknowledging that this could potentially worsen her anxiety. Rebecca has concerns about the side effects of antidepressants in general, as she feels they have led to weight gain and mood instability. She expresses a desire to manage her symptoms without the use of antidepressants, if possible.    In terms of other treatment options, Rebecca has considered trying Buspirone, a non-antidepressant medication for anxiety. However, she wants to first see how she tolerates the reduction in Effexor before making a decision. She has also previously tried Gabapentin for her anxiety.             The following assessments were completed by patient for this visit:  PROMIS 10-Global Health (only subscores and total score):       3/31/2023    11:27 AM 4/3/2023     9:12 AM 5/4/2023     9:17 AM 10/10/2023    10:00 AM 3/15/2024    10:01 AM 7/23/2024    11:56 AM   PROMIS-10 Scores Only   Global Mental Health Score 8 8 8    8 7 11    11 10    10   Global Physical Health Score 12 13 13    13 13 15    15 13    13   PROMIS TOTAL - SUBSCORES 20 21 21    21 20 26    26 23    23             5/24/2024     8:28 AM 7/23/2024    11:54 AM 10/7/2024     9:43 AM   PHQ   PHQ-9 Total Score 7 18 7   Q9: Thoughts of better off dead/self-harm past 2 weeks Not at all Not at all Not at all          5/24/2024     8:29 AM 7/23/2024    11:55 AM 10/7/2024     9:43 AM   BRETT-7 SCORE   Total Score 17 (severe anxiety) 21 (severe anxiety) 21 (severe anxiety)   Total Score 17    17 21    21 21    21        OBJECTIVE     Vital Signs:   There were no vitals taken for this visit.    Labs:    Results                     Current Medications:  Current Outpatient Medications   Medication Sig Dispense Refill    venlafaxine (EFFEXOR XR) 37.5 MG 24 hr capsule Take 1 capsule (37.5 mg) by mouth daily. 30 capsule 2    ALPRAZolam (XANAX) 0.5 MG tablet Take 1 tablet (0.5 mg) by mouth daily as needed for anxiety 10 tablet 3    ECHINACEA EXTRACT PO       levonorgestrel (MIRENA) 20 MCG/24HR IUD 1 each (20 mcg) by Intrauterine route once      Multiple Vitamins-Minerals (EMERGEN-C IMMUNE PO)  (Patient not taking: Reported on 12/13/2023)      phentermine 15 MG capsule Take 1 capsule (15 mg) by mouth every morning 30 capsule 0    Prenatal Vit-Fe Fumarate-FA (PRENATAL VITAMIN AND MINERAL PO)        No current facility-administered medications for this visit.            ADDED HISTORY   The patient is currently dealing with a lot of stress due to changes in work, school, and family schedules. She recently started a new job and is finishing up school. She also has to manage her children's school schedules and activities. She has to wake up early for work, around 4:00 AM, two to three times a week, and her sleep schedule fluctuates.    Physical exam    Physical Exam    - Anxiety, depression, affect:  - Speech and language:  - Insight and judgment:  - Alert and orientation to person, place and situation:  - SI: None  - HI: None  - AH: None  - VH: None           MENTAL STATUS EXAMINATION:   Appearance: Intact attention to grooming and hygiene  Attitude: Cooperative  Eye Contact: Good  Gait and Station: Sitting  Psychomotor Behavior: Within normal limits  Oriented to: Grossly person place or time  Attention Span and Concentration: Grossly intact  Speech: Within normal limits  Language: English  Mood:  anxious  Affect: constricted  Associations:  no loose associations  Thought Process:  logical, linear and goal oriented  Thought Content: No evidence of delusions or suicidal or homicidal ideation plan or intent  Memory: Grossly intact  Fund of  Knowledge: Good  Insight:  good  Judgment:  intact, adequate for safety  Impulse Control:  intact        DIAGNOSES:   Major depressive disorder, moderate, recurrent  Panic Disorder  Generalized anxiety disorder      ASSESSMENT:   Patient with improvements in depression and anxiety with Effexor titration.  Plan was to titrate, her preference is to attempt holistic treatment rather than medication and tapering initiated as per patient preference.  Was advised of potential for difficulty.  She is undergoing psychotherapy so is to receive the recommended treatment regardless.    Today Rebecca Fischer reports no suicidal ideations. In addition, she has notable risk factors for self-harm, including anxiety. However, risk is mitigated by commitment to family, Adventist beliefs, and absence of past attempts. Therefore, based on all available evidence including the factors cited above, she does not appear to be at imminent risk for self-harm, does not meet criteria for a 72-hr hold, and therefore remains appropriate for ongoing outpatient level of care.       PLAN:     Patient advised of consultative model. Patient will continue to be seen for ongoing consultation and stabilization.  Does not meet criteria for involuntary treatment or hospitalization  Effexor  mg daily partial benefits => 5/24/24 112.5 mg x 1 month and 75 mg daily worse anxiety; patient preference due to concerns of more labile mood => 10/8/24 37.5 mg daily-Risks, benefits and alternatives discussed.  Patient provides verbal consent to treatment.  Declines buspar trial but will assess after venlafaxine reduction; otherwise prefers not antidepressant medications.  Could consider lamotrigine  Continue alprazolam 0.5 mg daily as needed anxiety-Risks, benefits and alternatives discussed.  Patient provides verbal consent to treatment.  Controlled substance rules sent by medical message and key rules discussed. Up to quantity 10/month  Options-buspirone  only with therapy, duloxetine, Viibryd  DC'd gabapentin (no need, lacking benefits at low dose), trazodone (hang over effect)  Labs -no further labs indicated at this time  Referred for acupuncture for mental health  Undergoing psychotherapy with CHASE Kimbrough      Assessment & Plan     Assessment  The patient's generalized anxiety disorder is currently exacerbated, with increased frequency and severity of panic attacks. She also reports residual sedation from Trazodone, which she has discontinued. The patient's sleep-wake cycle is irregular due to her occupational schedule, which may be contributing to her symptoms. She has expressed a desire to reduce her use of antidepressants, citing concerns about weight gain and mood instability. The patient has agreed to a trial reduction of her Effexor dosage to 37.5 mg PO, with the understanding that this could potentially worsen her anxiety.    Plan  - Reduce Effexor dosage to 37.5 mg PO and monitor for any exacerbation of anxiety symptoms.  - Discontinue Trazodone due to side effects.  - Consider alternative pharmacological agents for anxiety and depression if indicated, such as Buspirone, Vibrate, Vilazodone, Lamotrigine, or gabapentin.  - Advise patient to contact healthcare provider if symptoms exacerbate or persist.    Prescription  Venlafaxine 37.5 mg PO    Appointments  Next appointment to be scheduled in approximately six weeks           Administrative Billing:   Time spent with patient was greater than 50% of time and/or significant time was spent in counseling and coordination of care regarding above diagnoses and treatment plan. Pre charting time and post charting time/documentation/coordination are done on date of service.      Signed:   Martin Bose M.D.  [facility] Psychiatry Service    Disclaimer: This note consists of symbols derived from keyboarding, dictation and/or voice recognition software. As a result, there may be errors in the script that have  gone undetected. Please consider this when interpreting information found in this chart.    Consent was obtained from the patient to use an AI documentation tool in the creation of this note.   The longitudinal plan of care for the diagnosis(es)/condition(s) as documented were addressed during this visit. Due to the added complexity in care, I will continue to support Rebecca in the subsequent management and with ongoing continuity of care.    eoc

## 2024-11-06 ENCOUNTER — MYC REFILL (OUTPATIENT)
Dept: PSYCHIATRY | Facility: CLINIC | Age: 39
End: 2024-11-06
Payer: COMMERCIAL

## 2024-11-06 DIAGNOSIS — F41.1 GENERALIZED ANXIETY DISORDER: ICD-10-CM

## 2024-11-06 RX ORDER — ALPRAZOLAM 0.5 MG
0.5 TABLET ORAL DAILY PRN
Qty: 10 TABLET | Refills: 3 | Status: SHIPPED | OUTPATIENT
Start: 2024-11-06

## 2024-11-06 NOTE — TELEPHONE ENCOUNTER
Date of Last Office Visit: 10/8/24  Date of Next Office Visit: None; routing for A to assist pt with scheduling.    No shows since last visit: No  More than one patient-initiated cancellation (with reschedule) since last seen in clinic? No    []Medication refilled per  Medication Refill in Ambulatory Care  policy.  [x]Medication unable to be refilled by RN due to criteria not met as indicated below:    []Eligibility: has not had a provider visit within last 6 months   []Supervision: no future appointment; < 7 days before next appointment   []Compliance: no shows; cancellations; lapse in therapy   []Verification: order discrepancy; may need modification...   [] > 30-day supply request   []Advanced refill request: > 7 days before refill date   [x]Controlled medication   []Medication not included in policy   []Review: new med; med adjusted <= 30 days; safety alert; requires lab monitoring...   []Scope of Practice: refill request processed by LPN/MA   []Other:      Medication(s) requested:     -  ALPRAZolam (XANAX) 0.5 MG tablet   Date last ordered: 7/24/24  Qty: 10  Refills: 3  Appropriate for refill? Provider to review.        Any Controlled Substance(s)? Yes   [place]  checked? No; not current delegate      Requested medication(s) verified as identical to current order? Yes including refills; pharmacy changed     Any lapse in adherence to medication(s) greater than 5 days? Unknown     Additional action taken? routed encounter to provider for review.      Last visit treatment plan:   PLAN:      Patient advised of consultative model. Patient will continue to be seen for ongoing consultation and stabilization.  Does not meet criteria for involuntary treatment or hospitalization  Effexor  mg daily partial benefits => 5/24/24 112.5 mg x 1 month and 75 mg daily worse anxiety; patient preference due to concerns of more labile mood => 10/8/24 37.5 mg daily-Risks, benefits and alternatives discussed.  Patient provides  verbal consent to treatment.  Declines buspar trial but will assess after venlafaxine reduction; otherwise prefers not antidepressant medications.  Could consider lamotrigine  Continue alprazolam 0.5 mg daily as needed anxiety-Risks, benefits and alternatives discussed.  Patient provides verbal consent to treatment.  Controlled substance rules sent by medical message and key rules discussed. Up to quantity 10/month  Options-buspirone only with therapy, duloxetine, Viibryd  DC'd gabapentin (no need, lacking benefits at low dose), trazodone (hang over effect)  Labs -no further labs indicated at this time  Referred for acupuncture for mental health  Undergoing psychotherapy with CHASE Kimbrough     Appointments  Next appointment to be scheduled in approximately six weeks     Any medication(s) require lab monitoring? No

## 2024-11-13 ENCOUNTER — PATIENT OUTREACH (OUTPATIENT)
Dept: CARE COORDINATION | Facility: CLINIC | Age: 39
End: 2024-11-13
Payer: COMMERCIAL

## 2025-02-08 ENCOUNTER — HEALTH MAINTENANCE LETTER (OUTPATIENT)
Age: 40
End: 2025-02-08

## 2025-02-13 ENCOUNTER — OFFICE VISIT (OUTPATIENT)
Dept: INTERNAL MEDICINE | Facility: CLINIC | Age: 40
End: 2025-02-13
Payer: COMMERCIAL

## 2025-02-13 VITALS
HEART RATE: 70 BPM | DIASTOLIC BLOOD PRESSURE: 83 MMHG | RESPIRATION RATE: 16 BRPM | BODY MASS INDEX: 30.9 KG/M2 | WEIGHT: 181 LBS | HEIGHT: 64 IN | OXYGEN SATURATION: 100 % | SYSTOLIC BLOOD PRESSURE: 133 MMHG | TEMPERATURE: 97.7 F

## 2025-02-13 DIAGNOSIS — Z13.29 SCREENING FOR ENDOCRINE DISORDER: ICD-10-CM

## 2025-02-13 DIAGNOSIS — Z13.0 SCREENING FOR DEFICIENCY ANEMIA: ICD-10-CM

## 2025-02-13 DIAGNOSIS — Z00.00 ROUTINE GENERAL MEDICAL EXAMINATION AT A HEALTH CARE FACILITY: Primary | ICD-10-CM

## 2025-02-13 DIAGNOSIS — R53.83 OTHER FATIGUE: ICD-10-CM

## 2025-02-13 DIAGNOSIS — R63.4 WEIGHT LOSS: ICD-10-CM

## 2025-02-13 DIAGNOSIS — F33.1 MAJOR DEPRESSIVE DISORDER, RECURRENT EPISODE, MODERATE (H): ICD-10-CM

## 2025-02-13 DIAGNOSIS — Z13.220 SCREENING FOR HYPERLIPIDEMIA: ICD-10-CM

## 2025-02-13 DIAGNOSIS — Z13.6 SCREENING FOR CARDIOVASCULAR CONDITION: ICD-10-CM

## 2025-02-13 LAB
BASOPHILS # BLD AUTO: 0.1 10E3/UL (ref 0–0.2)
BASOPHILS NFR BLD AUTO: 1 %
EOSINOPHIL # BLD AUTO: 0.2 10E3/UL (ref 0–0.7)
EOSINOPHIL NFR BLD AUTO: 2 %
ERYTHROCYTE [DISTWIDTH] IN BLOOD BY AUTOMATED COUNT: 12.5 % (ref 10–15)
HCT VFR BLD AUTO: 37.7 % (ref 35–47)
HGB BLD-MCNC: 12.2 G/DL (ref 11.7–15.7)
IMM GRANULOCYTES # BLD: 0 10E3/UL
IMM GRANULOCYTES NFR BLD: 0 %
LYMPHOCYTES # BLD AUTO: 2.9 10E3/UL (ref 0.8–5.3)
LYMPHOCYTES NFR BLD AUTO: 35 %
MCH RBC QN AUTO: 28.5 PG (ref 26.5–33)
MCHC RBC AUTO-ENTMCNC: 32.4 G/DL (ref 31.5–36.5)
MCV RBC AUTO: 88 FL (ref 78–100)
MONOCYTES # BLD AUTO: 0.6 10E3/UL (ref 0–1.3)
MONOCYTES NFR BLD AUTO: 8 %
NEUTROPHILS # BLD AUTO: 4.5 10E3/UL (ref 1.6–8.3)
NEUTROPHILS NFR BLD AUTO: 55 %
PLATELET # BLD AUTO: 267 10E3/UL (ref 150–450)
RBC # BLD AUTO: 4.28 10E6/UL (ref 3.8–5.2)
WBC # BLD AUTO: 8.3 10E3/UL (ref 4–11)

## 2025-02-13 RX ORDER — BUPROPION HYDROCHLORIDE 75 MG/1
75 TABLET ORAL DAILY
Qty: 30 TABLET | Refills: 0 | Status: SHIPPED | OUTPATIENT
Start: 2025-02-13

## 2025-02-13 RX ORDER — PHENTERMINE HYDROCHLORIDE 15 MG/1
15 CAPSULE ORAL EVERY MORNING
Qty: 30 CAPSULE | Refills: 0 | Status: SHIPPED | OUTPATIENT
Start: 2025-02-13

## 2025-02-13 SDOH — HEALTH STABILITY: PHYSICAL HEALTH: ON AVERAGE, HOW MANY DAYS PER WEEK DO YOU ENGAGE IN MODERATE TO STRENUOUS EXERCISE (LIKE A BRISK WALK)?: 4 DAYS

## 2025-02-13 SDOH — HEALTH STABILITY: PHYSICAL HEALTH: ON AVERAGE, HOW MANY MINUTES DO YOU ENGAGE IN EXERCISE AT THIS LEVEL?: 60 MIN

## 2025-02-13 ASSESSMENT — PATIENT HEALTH QUESTIONNAIRE - PHQ9
SUM OF ALL RESPONSES TO PHQ QUESTIONS 1-9: 5
10. IF YOU CHECKED OFF ANY PROBLEMS, HOW DIFFICULT HAVE THESE PROBLEMS MADE IT FOR YOU TO DO YOUR WORK, TAKE CARE OF THINGS AT HOME, OR GET ALONG WITH OTHER PEOPLE: SOMEWHAT DIFFICULT
SUM OF ALL RESPONSES TO PHQ QUESTIONS 1-9: 5

## 2025-02-13 ASSESSMENT — SOCIAL DETERMINANTS OF HEALTH (SDOH): HOW OFTEN DO YOU GET TOGETHER WITH FRIENDS OR RELATIVES?: NEVER

## 2025-02-13 NOTE — PATIENT INSTRUCTIONS
Follow in 2 weeks     Patient Education   Preventive Care Advice   This is general advice given by our system to help you stay healthy. However, your care team may have specific advice just for you. Please talk to your care team about your preventive care needs.  Nutrition  Eat 5 or more servings of fruits and vegetables each day.  Try wheat bread, brown rice and whole grain pasta (instead of white bread, rice, and pasta).  Get enough calcium and vitamin D. Check the label on foods and aim for 100% of the RDA (recommended daily allowance).  Lifestyle  Exercise at least 150 minutes each week  (30 minutes a day, 5 days a week).  Do muscle strengthening activities 2 days a week. These help control your weight and prevent disease.  No smoking.  Wear sunscreen to prevent skin cancer.  Have a dental exam and cleaning every 6 months.  Yearly exams  See your health care team every year to talk about:  Any changes in your health.  Any medicines your care team has prescribed.  Preventive care, family planning, and ways to prevent chronic diseases.  Shots (vaccines)   HPV shots (up to age 26), if you've never had them before.  Hepatitis B shots (up to age 59), if you've never had them before.  COVID-19 shot: Get this shot when it's due.  Flu shot: Get a flu shot every year.  Tetanus shot: Get a tetanus shot every 10 years.  Pneumococcal, hepatitis A, and RSV shots: Ask your care team if you need these based on your risk.  Shingles shot (for age 50 and up)  General health tests  Diabetes screening:  Starting at age 35, Get screened for diabetes at least every 3 years.  If you are younger than age 35, ask your care team if you should be screened for diabetes.  Cholesterol test: At age 39, start having a cholesterol test every 5 years, or more often if advised.  Bone density scan (DEXA): At age 50, ask your care team if you should have this scan for osteoporosis (brittle bones).  Hepatitis C: Get tested at least once in your  life.  STIs (sexually transmitted infections)  Before age 24: Ask your care team if you should be screened for STIs.  After age 24: Get screened for STIs if you're at risk. You are at risk for STIs (including HIV) if:  You are sexually active with more than one person.  You don't use condoms every time.  You or a partner was diagnosed with a sexually transmitted infection.  If you are at risk for HIV, ask about PrEP medicine to prevent HIV.  Get tested for HIV at least once in your life, whether you are at risk for HIV or not.  Cancer screening tests  Cervical cancer screening: If you have a cervix, begin getting regular cervical cancer screening tests starting at age 21.  Breast cancer scan (mammogram): If you've ever had breasts, begin having regular mammograms starting at age 40. This is a scan to check for breast cancer.  Colon cancer screening: It is important to start screening for colon cancer at age 45.  Have a colonoscopy test every 10 years (or more often if you're at risk) Or, ask your provider about stool tests like a FIT test every year or Cologuard test every 3 years.  To learn more about your testing options, visit:   .  For help making a decision, visit:   https://bit.ly/sg86700.  Prostate cancer screening test: If you have a prostate, ask your care team if a prostate cancer screening test (PSA) at age 55 is right for you.  Lung cancer screening: If you are a current or former smoker ages 50 to 80, ask your care team if ongoing lung cancer screenings are right for you.  For informational purposes only. Not to replace the advice of your health care provider. Copyright   2023 [facility]. All rights reserved. Clinically reviewed by the [facility]. Optyn 995575 - REV 01/24.  Learning About Stress  What is stress?     Stress is your body's response to a hard situation. Your body can have a physical, emotional, or mental response. Stress is a fact of life for  most people, and it affects everyone differently. What causes stress for you may not be stressful for someone else.  A lot of things can cause stress. You may feel stress when you go on a job interview, take a test, or run a race. This kind of short-term stress is normal and even useful. It can help you if you need to work hard or react quickly. For example, stress can help you finish an important job on time.  Long-term stress is caused by ongoing stressful situations or events. Examples of long-term stress include long-term health problems, ongoing problems at work, or conflicts in your family. Long-term stress can harm your health.  How does stress affect your health?  When you are stressed, your body responds as though you are in danger. It makes hormones that speed up your heart, make you breathe faster, and give you a burst of energy. This is called the fight-or-flight stress response. If the stress is over quickly, your body goes back to normal and no harm is done.  But if stress happens too often or lasts too long, it can have bad effects. Long-term stress can make you more likely to get sick, and it can make symptoms of some diseases worse. If you tense up when you are stressed, you may develop neck, shoulder, or low back pain. Stress is linked to high blood pressure and heart disease.  Stress also harms your emotional health. It can make you strange, tense, or depressed. Your relationships may suffer, and you may not do well at work or school.  What can you do to manage stress?  You can try these things to help manage stress:   Do something active. Exercise or activity can help reduce stress. Walking is a great way to get started. Even everyday activities such as housecleaning or yard work can help.  Try yoga or pratibha chi. These techniques combine exercise and meditation. You may need some training at first to learn them.  Do something you enjoy. For example, listen to music or go to a movie. Practice your  "hobby or do volunteer work.  Meditate. This can help you relax, because you are not worrying about what happened before or what may happen in the future.  Do guided imagery. Imagine yourself in any setting that helps you feel calm. You can use online videos, books, or a teacher to guide you.  Do breathing exercises. For example:  From a standing position, bend forward from the waist with your knees slightly bent. Let your arms dangle close to the floor.  Breathe in slowly and deeply as you return to a standing position. Roll up slowly and lift your head last.  Hold your breath for just a few seconds in the standing position.  Breathe out slowly and bend forward from the waist.  Let your feelings out. Talk, laugh, cry, and express anger when you need to. Talking with supportive friends or family, a counselor, or a poncho leader about your feelings is a healthy way to relieve stress. Avoid discussing your feelings with people who make you feel worse.  Write. It may help to write about things that are bothering you. This helps you find out how much stress you feel and what is causing it. When you know this, you can find better ways to cope.  What can you do to prevent stress?  You might try some of these things to help prevent stress:  Manage your time. This helps you find time to do the things you want and need to do.  Get enough sleep. Your body recovers from the stresses of the day while you are sleeping.  Get support. Your family, friends, and community can make a difference in how you experience stress.  Limit your news feed. Avoid or limit time on social media or news that may make you feel stressed.  Do something active. Exercise or activity can help reduce stress. Walking is a great way to get started.  Where can you learn more?  Go to https://www.healthwise.net/patiented  Enter N032 in the search box to learn more about \"Learning About Stress.\"  Current as of: October 24, 2023  Content Version: 14.3    2024 " [employer].   Care instructions adapted under license by your healthcare professional. If you have questions about a medical condition or this instruction, always ask your healthcare professional. [employer] disclaims any warranty or liability for your use of this information.    Learning About Depression Screening  What is depression screening?  Depression screening is a way to see if you have depression symptoms. It may be done by a doctor or counselor. It's often part of a routine checkup. That's because your mental health is just as important as your physical health.  Depression is a mental health condition that affects how you feel, think, and act. You may:  Have less energy.  Lose interest in your daily activities.  Feel sad and grouchy for a long time.  Depression is very common. It affects people of all ages.  Many things can lead to depression. Some people become depressed after they have a stroke or find out they have a major illness like cancer or heart disease. The death of a loved one or a breakup may lead to depression. It can run in families. Most experts believe that a combination of inherited genes and stressful life events can cause it.  What happens during screening?  You may be asked to fill out a form about your depression symptoms. You and the doctor will discuss your answers. The doctor may ask you more questions to learn more about how you think, act, and feel.  What happens after screening?  If you have symptoms of depression, your doctor will talk to you about your options.  Doctors usually treat depression with medicines or counseling. Often, combining the two works best. Many people don't get help because they think that they'll get over the depression on their own. But people with depression may not get better unless they get treatment.  The cause of depression is not well understood. There may be many factors involved. But if you have depression, it's not your  "fault.  A serious symptom of depression is thinking about death or suicide. If you or someone you care about talks about this or about feeling hopeless, get help right away.  It's important to know that depression can be treated. Medicine, counseling, and self-care may help.  Where can you learn more?  Go to https://www.[employer].net/patiented  Enter T185 in the search box to learn more about \"Learning About Depression Screening.\"  Current as of: July 31, 2024  Content Version: 14.3    2024 [employer].   Care instructions adapted under license by your healthcare professional. If you have questions about a medical condition or this instruction, always ask your healthcare professional. [employer] disclaims any warranty or liability for your use of this information.       "

## 2025-02-13 NOTE — PROGRESS NOTES
"Preventive Care Visit  [facility] IGOR Erwin CNP, Internal Medicine  Feb 13, 2025      Assessment & Plan     (Z00.00) Routine general medical examination at a health care facility  (primary encounter diagnosis)  Comment: Patient seen today for preventative wellness exam. Patient wanted to discuss weight loss. Discussed labs and screenings.   Plan: REVIEW OF HEALTH MAINTENANCE PROTOCOL ORDERS            (R63.4) Weight loss  Comment: Discussed weight loss medications at length. Decided to go with phentermine and Wellbutrin. Discussed need for follow up in 2 weeks.   Plan: phentermine 15 MG capsule, buPROPion         (WELLBUTRIN) 75 MG tablet        Prescription sent to pharmacy     (Z13.220) Screening for hyperlipidemia  Comment: Discussed recommendation to screen.   Plan: Lipid panel reflex to direct LDL Non-fasting        Pending     (Z13.0) Screening for deficiency anemia  Comment: Discussed recommendation to screen.   Plan: CBC with platelets and differential        Pending     (Z13.29) Screening for endocrine disorder  Comment: Discussed recommendation to screen.   Plan: TSH with free T4 reflex        Pending     (Z13.6) Screening for cardiovascular condition  Comment: Discussed recommendation to screen.   Plan: Comprehensive metabolic panel (BMP + Alb, Alk         Phos, ALT, AST, Total. Bili, TP)        Pending     (R53.83) Other fatigue  Comment: Discussed labs.   Plan: Vitamin D Deficiency        Pending     (F33.1) Major depressive disorder, recurrent episode, moderate (H)  Comment: Chronic, stable.   Plan: No changes to plan of care.     Patient has been advised of split billing requirements and indicates understanding: Yes        BMI  Estimated body mass index is 31.31 kg/m  as calculated from the following:    Height as of this encounter: 1.619 m (5' 3.75\").    Weight as of this encounter: 82.1 kg (181 lb).       Counseling  Appropriate preventive services were " addressed with this patient via screening, questionnaire, or discussion as appropriate for fall prevention, nutrition, physical activity, Tobacco-use cessation, social engagement, weight loss and cognition.  Checklist reviewing preventive services available has been given to the patient.  Reviewed patient's diet, addressing concerns and/or questions.   Patient is at risk for social isolation and has been provided with information about the benefit of social connection.   She is at risk for psychosocial distress and has been provided with information to reduce risk.   The patient's PHQ-9 score is consistent with mild depression. She was provided with information regarding depression.           Santana Fernandez is a 39 year old, presenting for the following:  Physical        2/13/2025     3:34 PM   Additional Questions   Roomed by Estrella ATKINSON      Weight check     Health Care Directive  Patient does not have a Health Care Directive: Discussed advance care planning with patient; however, patient declined at this time.      2/13/2025   General Health   How would you rate your overall physical health? Good   Feel stress (tense, anxious, or unable to sleep) Rather much   (!) STRESS CONCERN      2/13/2025   Nutrition   Three or more servings of calcium each day? Yes   Diet: Regular (no restrictions)   How many servings of fruit and vegetables per day? (!) 2-3   How many sweetened beverages each day? 0-1         2/13/2025   Exercise   Days per week of moderate/strenous exercise 4 days   Average minutes spent exercising at this level 60 min         2/13/2025   Social Factors   Frequency of gathering with friends or relatives Never   Worry food won't last until get money to buy more No   Food not last or not have enough money for food? No   Do you have housing? (Housing is defined as stable permanent housing and does not include staying ouside in a car, in a tent, in an abandoned building, in an overnight  "shelter, or couch-surfing.) Yes   Are you worried about losing your housing? No   Lack of transportation? No   Unable to get utilities (heat,electricity)? No   (!) SOCIAL CONNECTIONS CONCERN      2025   Dental   Dentist two times every year? Yes          Today's PHQ-9 Score:       2025     1:08 PM   PHQ-9 SCORE   PHQ-9 Total Score MyChart 5 (Mild depression)   PHQ-9 Total Score 5        Patient-reported         2025   Substance Use   Alcohol more than 3/day or more than 7/wk No   Do you use any other substances recreationally? No     Social History     Tobacco Use    Smoking status: Former     Current packs/day: 0.00     Types: Cigarettes     Quit date: 2006     Years since quittin.2    Smokeless tobacco: Never   Vaping Use    Vaping status: Never Used   Substance Use Topics    Alcohol use: Yes    Drug use: Never          Mammogram Screening - Patient under 40 years of age: Routine Mammogram Screening not recommended.         2025   STI Screening   New sexual partner(s) since last STI/HIV test? No     History of abnormal Pap smear: No - age 30-64 HPV with reflex Pap every 5 years recommended        Latest Ref Rng & Units 2023    10:07 AM   PAP / HPV   PAP  Negative for Intraepithelial Lesion or Malignancy (NILM)    HPV 16 DNA Negative Negative    HPV 18 DNA Negative Negative    Other HR HPV Negative Negative            2025   Contraception/Family Planning   Questions about contraception or family planning (!) YES         Reviewed and updated as needed this visit by Provider                    Lab work is in process  Labs reviewed in EPIC      Review of Systems  Constitutional, HEENT, cardiovascular, pulmonary, gi and gu systems are negative, except as otherwise noted.     Objective    Exam  There were no vitals taken for this visit.   Estimated body mass index is 31 kg/m  as calculated from the following:    Height as of 24: 1.632 m (5' 4.25\").    Weight as of 24: " 82.6 kg (182 lb).    Physical Exam  GENERAL: alert and no distress  EYES: Eyes grossly normal to inspection, PERRL and conjunctivae and sclerae normal  HENT: ear canals and TM's normal, nose and mouth without ulcers or lesions  NECK: no adenopathy, no asymmetry, masses, or scars  RESP: lungs clear to auscultation - no rales, rhonchi or wheezes  CV: regular rate and rhythm, normal S1 S2, no S3 or S4, no murmur, click or rub, no peripheral edema  ABDOMEN: soft, nontender, no hepatosplenomegaly, no masses and bowel sounds normal  MS: no gross musculoskeletal defects noted, no edema  SKIN: no suspicious lesions or rashes  NEURO: Normal strength and tone, mentation intact and speech normal  PSYCH: mentation appears normal, affect normal/bright        Signed Electronically by: IGRO Jauregui CNP    Answers submitted by the patient for this visit:  Patient Health Questionnaire (Submitted on 2/13/2025)  If you checked off any problems, how difficult have these problems made it for you to do your work, take care of things at home, or get along with other people?: Somewhat difficult  PHQ9 TOTAL SCORE: 5

## 2025-02-28 ENCOUNTER — MYC MEDICAL ADVICE (OUTPATIENT)
Dept: INTERNAL MEDICINE | Facility: CLINIC | Age: 40
End: 2025-02-28
Payer: COMMERCIAL

## 2025-02-28 DIAGNOSIS — R63.4 WEIGHT LOSS: Primary | ICD-10-CM

## 2025-03-17 RX ORDER — BUPROPION HYDROCHLORIDE 150 MG/1
150 TABLET ORAL EVERY MORNING
Qty: 30 TABLET | Refills: 1 | Status: SHIPPED | OUTPATIENT
Start: 2025-03-17

## 2025-03-17 NOTE — TELEPHONE ENCOUNTER
Patient is tolerating Wellbutrin increased dose to 150 mg XL. Will keep phentermine at current dose.

## 2025-03-18 DIAGNOSIS — R63.4 WEIGHT LOSS: ICD-10-CM

## 2025-03-18 RX ORDER — BUPROPION HYDROCHLORIDE 75 MG/1
75 TABLET ORAL DAILY
Qty: 30 TABLET | Refills: 0 | OUTPATIENT
Start: 2025-03-18

## 2025-03-18 RX ORDER — PHENTERMINE HYDROCHLORIDE 15 MG/1
15 CAPSULE ORAL EVERY MORNING
Qty: 30 CAPSULE | Refills: 0 | Status: SHIPPED | OUTPATIENT
Start: 2025-03-18

## 2025-03-18 NOTE — TELEPHONE ENCOUNTER
See separate refill encounter from today 3/18/25 for phentermine.   Refill encounter was already route to PCP to address    Sue Rey RN  [facility]

## 2025-04-11 ENCOUNTER — MYC REFILL (OUTPATIENT)
Dept: INTERNAL MEDICINE | Facility: CLINIC | Age: 40
End: 2025-04-11
Payer: COMMERCIAL

## 2025-04-11 ENCOUNTER — MYC REFILL (OUTPATIENT)
Dept: PSYCHIATRY | Facility: CLINIC | Age: 40
End: 2025-04-11
Payer: COMMERCIAL

## 2025-04-11 DIAGNOSIS — R63.4 WEIGHT LOSS: ICD-10-CM

## 2025-04-11 DIAGNOSIS — F41.1 GENERALIZED ANXIETY DISORDER: ICD-10-CM

## 2025-04-11 NOTE — TELEPHONE ENCOUNTER
1) Reviewed refill request for Alprazolam 0.5mg    2) the patient has requested to transfer back to PCP      3) Notified the patient via MyChart and forwarded refill request to PCP.       BOGDAN SHER RN on 4/11/2025 at 3:01 PM

## 2025-04-14 ENCOUNTER — MYC MEDICAL ADVICE (OUTPATIENT)
Dept: INTERNAL MEDICINE | Facility: CLINIC | Age: 40
End: 2025-04-14
Payer: COMMERCIAL

## 2025-04-14 DIAGNOSIS — F41.1 GENERALIZED ANXIETY DISORDER: ICD-10-CM

## 2025-04-14 DIAGNOSIS — R63.4 WEIGHT LOSS: ICD-10-CM

## 2025-04-14 RX ORDER — PHENTERMINE HYDROCHLORIDE 15 MG/1
15 CAPSULE ORAL EVERY MORNING
Qty: 30 CAPSULE | Refills: 0 | Status: SHIPPED | OUTPATIENT
Start: 2025-04-14

## 2025-04-14 RX ORDER — PHENTERMINE HYDROCHLORIDE 15 MG/1
15 CAPSULE ORAL EVERY MORNING
Qty: 30 CAPSULE | Refills: 0 | OUTPATIENT
Start: 2025-04-14

## 2025-04-14 RX ORDER — ALPRAZOLAM 0.5 MG
0.5 TABLET ORAL DAILY PRN
Qty: 10 TABLET | Refills: 3 | Status: SHIPPED | OUTPATIENT
Start: 2025-04-14

## 2025-04-14 RX ORDER — ALPRAZOLAM 0.5 MG
0.5 TABLET ORAL DAILY PRN
Qty: 10 TABLET | Refills: 3 | OUTPATIENT
Start: 2025-04-14

## 2025-04-14 NOTE — TELEPHONE ENCOUNTER
Needs controlled substance form signed for further refills of Xanax. And needs appointment to discuss weight loss for phentermine.

## 2025-05-07 ENCOUNTER — OFFICE VISIT (OUTPATIENT)
Dept: INTERNAL MEDICINE | Facility: CLINIC | Age: 40
End: 2025-05-07
Payer: COMMERCIAL

## 2025-05-07 VITALS
DIASTOLIC BLOOD PRESSURE: 87 MMHG | OXYGEN SATURATION: 100 % | HEIGHT: 64 IN | HEART RATE: 94 BPM | BODY MASS INDEX: 27.49 KG/M2 | WEIGHT: 161 LBS | SYSTOLIC BLOOD PRESSURE: 125 MMHG | RESPIRATION RATE: 16 BRPM | TEMPERATURE: 97.5 F

## 2025-05-07 DIAGNOSIS — R63.4 WEIGHT LOSS: Primary | ICD-10-CM

## 2025-05-07 DIAGNOSIS — F41.1 GAD (GENERALIZED ANXIETY DISORDER): ICD-10-CM

## 2025-05-07 PROCEDURE — 3074F SYST BP LT 130 MM HG: CPT

## 2025-05-07 PROCEDURE — G2211 COMPLEX E/M VISIT ADD ON: HCPCS

## 2025-05-07 PROCEDURE — 3079F DIAST BP 80-89 MM HG: CPT

## 2025-05-07 PROCEDURE — 99213 OFFICE O/P EST LOW 20 MIN: CPT

## 2025-05-07 NOTE — PROGRESS NOTES
"  Assessment & Plan     (R63.4) Weight loss  (primary encounter diagnosis)  Comment: Patient seen in clinic today for follow up on weight loss. Patient is using phentermine, diet and exercise and has lot 20 lbs. Patient denies any negative side effects.   Plan: Continue with current plan of care.     (F41.1) BRETT (generalized anxiety disorder)  Comment: Chronic, stable. Patient at times needs alprazolam to help with symptom management. Patient did sign controlled substance agreement and has been using medication appropriately. PDMP reviewed no concerns noted. Ok for further refills.   Plan: Continue current plan of care           BMI  Estimated body mass index is 27.42 kg/m  as calculated from the following:    Height as of this encounter: 1.632 m (5' 4.25\").    Weight as of this encounter: 73 kg (161 lb).         Santana Fernandez is a 39 year old, presenting for the following health issues:  Recheck Medication        5/7/2025     7:41 AM   Additional Questions   Roomed by Estrella Prakash     History of Present Illness       Reason for visit:  Weight, follow up    She eats 4 or more servings of fruits and vegetables daily.She consumes 0 sweetened beverage(s) daily.She exercises with enough effort to increase her heart rate 30 to 60 minutes per day.  She exercises with enough effort to increase her heart rate 4 days per week.   She is taking medications regularly.                  Review of Systems  Constitutional, HEENT, cardiovascular, pulmonary, gi and gu systems are negative, except as otherwise noted.      Objective    There were no vitals taken for this visit.  There is no height or weight on file to calculate BMI.  Physical Exam   GENERAL: alert and no distress  NECK: no adenopathy, no asymmetry, masses, or scars  RESP: lungs clear to auscultation - no rales, rhonchi or wheezes  CV: regular rate and rhythm, normal S1 S2, no S3 or S4, no murmur, click or rub, no peripheral edema  ABDOMEN: soft, nontender, no " hepatosplenomegaly, no masses and bowel sounds normal  MS: no gross musculoskeletal defects noted, no edema        I spent a total of 20 minutes on the day of the visit.   Time spent by me today doing chart review, history and exam, documentation and further activities per the note  The longitudinal plan of care for the diagnosis(es)/condition(s) as documented were addressed during this visit. Due to the added complexity in care, I will continue to support Rebecca in the subsequent management and with ongoing continuity of care.  Signed Electronically by: IGOR Jauregui CNP

## 2025-05-07 NOTE — LETTER
Opioid / Opioid Plus Controlled Substance Agreement    This is an agreement between you and your provider about the safe and appropriate use of controlled substance/opioids prescribed by your care team. Controlled substances are medicines that can cause physical and mental dependence (abuse).    There are strict laws about having and using these medicines. We here at [facility] are committing to working with you in your efforts to get better. To support you in this work, we ll help you schedule regular office appointments for medicine refills. If we must cancel or change your appointment for any reason, we ll make sure you have enough medicine to last until your next appointment.     As a Provider, I will:  Listen carefully to your concerns and treat you with respect.   Recommend a treatment plan that I believe is in your best interest. This plan may involve therapies other than opioid pain medication.   Talk with you often about the possible benefits, and the risk of harm of any medicine that we prescribe for you.   Provide a plan on how to taper (discontinue or go off) using this medicine if the decision is made to stop its use.    As a Patient, I understand that opioid(s):   Are a controlled substance prescribed by my care team to help me function or work and manage my condition(s).   Are strong medicines and can cause serious side effects such as:  Drowsiness, which can seriously affect my driving ability  A lower breathing rate, enough to cause death  Harm to my thinking ability   Depression   Abuse of and addiction to this medicine  Need to be taken exactly as prescribed. Combining opioids with certain medicines or chemicals (such as illegal drugs, sedatives, sleeping pills, and benzodiazepines) can be dangerous or even fatal. If I stop opioids suddenly, I may have severe withdrawal symptoms.  Do not work for all types of pain nor for all patients. If they re not helpful, I may be asked to stop  them.    I am also being prescribed a stimulant controlled substance to help manage symptoms of attention deficit, appetite suppression, and/or fatigue.    The risks, benefits and side effects of these medicine(s) were explained to me. I agree that:  I will take part in other treatments as advised by my care team. This may be psychiatry or counseling, physical therapy, behavioral therapy, group treatment or a referral to a specialist.     I will keep all my appointments. I understand that this is part of the monitoring of opioids. My care team may require an office visit for EVERY opioid/controlled substance refill. If I miss appointments or don t follow instructions, my care team may stop my medicine.    I will take my medicines as prescribed. I will not change the dose or schedule unless my care team tells me to. There will be no refills if I run out early.     I may be asked to come to the clinic and complete a urine drug test or complete a pill count at any time. If I don t give a urine sample or participate in a pill count, the care team may stop my medicine.    I will only receive prescriptions from this clinic for chronic pain. If I am treated by another provider for acute pain issues, I will tell them that I am taking opioid pain medication for chronic pain and that I have a treatment agreement with this provider. I will inform my [facility] care team within one business day if I am given a prescription for any pain medication by another healthcare provider. My [facility] care team can contact other providers and pharmacists about my use of any medicines.    It is up to me to make sure that I don t run out of my medicines on weekends or holidays. If my care team is willing to refill my opioid prescription without a visit, I must request refills only during office hours. Refills may take up to 3 business days to process. I will use one pharmacy to fill all my opioid and other controlled  substance prescriptions. I will notify the clinic about any changes to my insurance or medication availability.    I am responsible for my prescriptions. If the medicine/prescription is lost, stolen or destroyed, it will not be replaced. I also agree not to share controlled substance medicines with anyone.    I am aware I should not use any illegal or recreational drugs. I agree not to drink alcohol unless my care team says I can.       If I enroll in the [place] [facility] program, I will tell my care team prior to my next refill.     I will tell my care team right away if I become pregnant, have a new medical problem treated outside of my regular clinic, or have a change in my medications.    I understand that this medicine can affect my thinking, judgment and reaction time. Alcohol and drugs affect the brain and body, which can affect the safety of my driving. Being under the influence of alcohol or drugs can affect my decision-making, behaviors, personal safety, and the safety of others. Driving while impaired (DWI) can occur if a person is driving, operating, or in physical control of a car, motorcycle, boat, snowmobile, ATV, motorbike, off-road vehicle, or any other motor vehicle ([place] Statute 169A.20). I understand the risk if I choose to drive or operate any vehicle or machinery.    I understand that if I do not follow any of the conditions above, my prescriptions or treatment may be stopped or changed.          Opioids  What You Need to Know    What are opioids?   Opioids are pain medicines that must be prescribed by a doctor. They are also known as narcotics.     Examples are:   morphine (MS Contin, Eliana)  oxycodone (Oxycontin)  oxycodone and acetaminophen (Percocet)  hydrocodone and acetaminophen (Vicodin, Norco)   fentanyl patch (Duragesic)   hydromorphone (Dilaudid)   methadone  codeine (Tylenol #3)     What do opioids do well?   Opioids are best for severe short-term pain such as after a  surgery or injury. They may work well for cancer pain. They may help some people with long-lasting (chronic) pain.     What do opioids NOT do well?   Opioids never get rid of pain entirely, and they don t work well for most patients with chronic pain. Opioids don t reduce swelling, one of the causes of pain.                                    Other ways to manage chronic pain and improve function include:     Treat the health problem that may be causing pain  Anti-inflammation medicines, which reduce swelling and tenderness, such as ibuprofen (Advil, Motrin) or naproxen (Aleve)  Acetaminophen (Tylenol)  Antidepressants and anti-seizure medicines, especially for nerve pain  Topical treatments such as patches or creams  Injections or nerve blocks  Chiropractic or osteopathic treatment  Acupuncture, massage, deep breathing, meditation, visual imagery, aromatherapy  Use heat or ice at the pain site  Physical therapy   Exercise  Stop smoking  Take part in therapy       Risks and side effects     Talk to your doctor before you start or decide to keep taking opioids. Possible side effects include:    Lowering your breathing rate enough to cause death  Overdose, including death, especially if taking higher than prescribed doses  Worse depression symptoms; less pleasure in things you usually enjoy  Feeling tired or sluggish  Slower thoughts or cloudy thinking  Being more sensitive to pain over time; pain is harder to control  Trouble sleeping or restless sleep  Changes in hormone levels (for example, less testosterone)  Changes in sex drive or ability to have sex  Constipation  Unsafe driving  Itching and sweating  Dizziness  Nausea, throwing up and dry mouth    What else should I know about opioids?    Opioids may lead to dependence, tolerance, or addiction.    Dependence means that if you stop or reduce the medicine too quickly, you will have withdrawal symptoms. These include loose poop (diarrhea), jitters, flu-like  symptoms, nervousness and tremors. Dependence is not the same as addiction.                     Tolerance means needing higher doses over time to get the same effect. This may increase the chance of serious side effects.    Addiction is when people improperly use a substance that harms their body, their mind or their relations with others. Use of opiates can cause a relapse of addiction if you have a history of drug or alcohol abuse.    People who have used opioids for a long time may have a lower quality of life, worse depression, higher levels of pain and more visits to doctors.    You can overdose on opioids. Take these steps to lower your risk of overdose:    Recognize the signs:  Signs of overdose include decrease or loss of consciousness (blackout), slowed breathing, trouble waking up and blue lips. If someone is worried about overdose, they should call 911.    Talk to your doctor about Narcan (naloxone).   If you are at risk for overdose, you may be given a prescription for Narcan. This medicine very quickly reverses the effects of opioids.   If you overdose, a friend or family member can give you Narcan while waiting for the ambulance. They need to know the signs of overdose and how to give Narcan.     Don't use alcohol or street drugs.   Taking them with opioids can cause death.    Do not take any of these medicines unless your doctor says it s OK. Taking these with opioids can cause death:  Benzodiazepines, such as lorazepam (Ativan), alprazolam (Xanax) or diazepam (Valium)  Muscle relaxers, such as cyclobenzaprine (Flexeril)  Sleeping pills like zolpidem (Ambien)   Other opioids      How to keep you and other people safe while taking opioids:    Never share your opioids with others.  Opioid medicines are regulated by the Drug Enforcement Agency (JESSICA). Selling or sharing medications is a criminal act.    2. Be sure to store opioids in a secure place, locked up if possible. Young children can easily swallow  them and overdose.    3. When you are traveling with your medicines, keep them in the original bottles. If you use a pill box, be sure you also carry a copy of your medicine list from your clinic or pharmacy.    4. Safe disposal of opioids    Most pharmacies have places to get rid of medicine, called disposal kiosks. Medicine disposal options are also available in every [facility]. Search your county and  medication disposal  to find more options. You can find more details at:  https://[email or web address].[email or web address]-green/managing-unwanted-medications     I agree that my provider, clinic care team, and pharmacy may work with any city, state or federal law enforcement agency that investigates the misuse, sale, or other diversion of my controlled medicine. I will allow my provider to discuss my care with, or share a copy of, this agreement with any other treating provider, pharmacy or emergency room where I receive care.    I have read this agreement and have asked questions about anything I did not understand.    _______________________________________________________  Patient Signature - Rebecca Fischer _____________________                   Date     _______________________________________________________  Provider Signature - IGOR Jauregui CNP   _____________________                   Date     _______________________________________________________  Witness Signature (required if provider not present while patient signing)   _____________________                   Date

## 2025-05-12 ENCOUNTER — MYC MEDICAL ADVICE (OUTPATIENT)
Dept: INTERNAL MEDICINE | Facility: CLINIC | Age: 40
End: 2025-05-12
Payer: COMMERCIAL

## 2025-05-12 ENCOUNTER — MYC REFILL (OUTPATIENT)
Dept: INTERNAL MEDICINE | Facility: CLINIC | Age: 40
End: 2025-05-12
Payer: COMMERCIAL

## 2025-05-12 DIAGNOSIS — R63.4 WEIGHT LOSS: ICD-10-CM

## 2025-05-12 RX ORDER — PHENTERMINE HYDROCHLORIDE 15 MG/1
15 CAPSULE ORAL EVERY MORNING
Qty: 30 CAPSULE | Refills: 0 | OUTPATIENT
Start: 2025-05-12

## 2025-05-12 RX ORDER — PHENTERMINE HYDROCHLORIDE 30 MG/1
30 CAPSULE ORAL EVERY MORNING
Qty: 30 CAPSULE | Refills: 0 | Status: SHIPPED | OUTPATIENT
Start: 2025-05-12

## 2025-05-14 DIAGNOSIS — R63.4 WEIGHT LOSS: ICD-10-CM

## 2025-05-14 RX ORDER — BUPROPION HYDROCHLORIDE 150 MG/1
150 TABLET ORAL EVERY MORNING
Qty: 30 TABLET | Refills: 1 | Status: SHIPPED | OUTPATIENT
Start: 2025-05-14

## 2025-06-12 DIAGNOSIS — R63.4 WEIGHT LOSS: ICD-10-CM

## 2025-06-12 RX ORDER — PHENTERMINE HYDROCHLORIDE 30 MG/1
30 CAPSULE ORAL EVERY MORNING
Qty: 30 CAPSULE | Refills: 0 | Status: SHIPPED | OUTPATIENT
Start: 2025-06-12

## 2025-07-14 DIAGNOSIS — R63.4 WEIGHT LOSS: ICD-10-CM

## 2025-07-14 RX ORDER — PHENTERMINE HYDROCHLORIDE 30 MG/1
30 CAPSULE ORAL EVERY MORNING
Qty: 30 CAPSULE | Refills: 0 | Status: SHIPPED | OUTPATIENT
Start: 2025-07-14

## 2025-07-14 RX ORDER — BUPROPION HYDROCHLORIDE 150 MG/1
150 TABLET ORAL EVERY MORNING
Qty: 30 TABLET | Refills: 1 | Status: SHIPPED | OUTPATIENT
Start: 2025-07-14

## 2025-08-18 ENCOUNTER — MYC REFILL (OUTPATIENT)
Dept: INTERNAL MEDICINE | Facility: CLINIC | Age: 40
End: 2025-08-18
Payer: COMMERCIAL

## 2025-08-18 DIAGNOSIS — F41.1 GENERALIZED ANXIETY DISORDER: ICD-10-CM

## 2025-08-19 RX ORDER — ALPRAZOLAM 0.5 MG
0.5 TABLET ORAL DAILY PRN
Qty: 10 TABLET | Refills: 1 | Status: SHIPPED | OUTPATIENT
Start: 2025-08-19